# Patient Record
Sex: MALE | Race: WHITE | Employment: OTHER | URBAN - METROPOLITAN AREA
[De-identification: names, ages, dates, MRNs, and addresses within clinical notes are randomized per-mention and may not be internally consistent; named-entity substitution may affect disease eponyms.]

---

## 2019-06-06 ENCOUNTER — OFFICE VISIT (OUTPATIENT)
Dept: FAMILY MEDICINE CLINIC | Age: 54
End: 2019-06-06
Payer: COMMERCIAL

## 2019-06-06 VITALS
OXYGEN SATURATION: 95 % | HEIGHT: 71 IN | DIASTOLIC BLOOD PRESSURE: 94 MMHG | SYSTOLIC BLOOD PRESSURE: 162 MMHG | WEIGHT: 273 LBS | TEMPERATURE: 97.4 F | BODY MASS INDEX: 38.22 KG/M2 | RESPIRATION RATE: 16 BRPM | HEART RATE: 88 BPM

## 2019-06-06 DIAGNOSIS — E66.01 CLASS 2 SEVERE OBESITY DUE TO EXCESS CALORIES WITH SERIOUS COMORBIDITY AND BODY MASS INDEX (BMI) OF 38.0 TO 38.9 IN ADULT (HCC): ICD-10-CM

## 2019-06-06 DIAGNOSIS — F41.9 ANXIETY AND DEPRESSION: ICD-10-CM

## 2019-06-06 DIAGNOSIS — Z12.11 SCREENING FOR COLON CANCER: ICD-10-CM

## 2019-06-06 DIAGNOSIS — M25.511 CHRONIC PAIN OF BOTH SHOULDERS: ICD-10-CM

## 2019-06-06 DIAGNOSIS — I10 ESSENTIAL HYPERTENSION: Primary | ICD-10-CM

## 2019-06-06 DIAGNOSIS — F32.A ANXIETY AND DEPRESSION: ICD-10-CM

## 2019-06-06 DIAGNOSIS — Z78.9 HEAVY DRINKER OF ALCOHOL: ICD-10-CM

## 2019-06-06 DIAGNOSIS — Z00.00 PREVENTATIVE HEALTH CARE: ICD-10-CM

## 2019-06-06 DIAGNOSIS — M25.512 CHRONIC PAIN OF BOTH SHOULDERS: ICD-10-CM

## 2019-06-06 DIAGNOSIS — G89.29 CHRONIC PAIN OF BOTH SHOULDERS: ICD-10-CM

## 2019-06-06 DIAGNOSIS — Z12.5 SPECIAL SCREENING FOR MALIGNANT NEOPLASM OF PROSTATE: ICD-10-CM

## 2019-06-06 DIAGNOSIS — K21.9 GASTRIC REFLUX: ICD-10-CM

## 2019-06-06 PROBLEM — E66.812 CLASS 2 SEVERE OBESITY DUE TO EXCESS CALORIES WITH SERIOUS COMORBIDITY AND BODY MASS INDEX (BMI) OF 38.0 TO 38.9 IN ADULT: Status: ACTIVE | Noted: 2019-06-06

## 2019-06-06 PROCEDURE — 99204 OFFICE O/P NEW MOD 45 MIN: CPT | Performed by: NURSE PRACTITIONER

## 2019-06-06 RX ORDER — LOSARTAN POTASSIUM 100 MG/1
100 TABLET ORAL DAILY
Qty: 90 TABLET | Refills: 1 | Status: SHIPPED | OUTPATIENT
Start: 2019-06-06 | End: 2019-12-05 | Stop reason: SDUPTHER

## 2019-06-06 RX ORDER — NAPROXEN 500 MG/1
500 TABLET ORAL 2 TIMES DAILY PRN
Qty: 180 TABLET | Refills: 0 | Status: SHIPPED | OUTPATIENT
Start: 2019-06-06 | End: 2019-12-05 | Stop reason: SDUPTHER

## 2019-06-06 RX ORDER — LOSARTAN POTASSIUM 100 MG/1
100 TABLET ORAL DAILY
COMMUNITY
End: 2019-06-06 | Stop reason: SDUPTHER

## 2019-06-06 RX ORDER — METOPROLOL TARTRATE 50 MG/1
50 TABLET, FILM COATED ORAL DAILY
Refills: 2 | COMMUNITY
Start: 2019-04-22 | End: 2019-06-06 | Stop reason: SDUPTHER

## 2019-06-06 RX ORDER — HYDROCHLOROTHIAZIDE 25 MG/1
25 TABLET ORAL DAILY
COMMUNITY
End: 2019-06-06 | Stop reason: SDUPTHER

## 2019-06-06 RX ORDER — METOPROLOL TARTRATE 50 MG/1
50 TABLET, FILM COATED ORAL DAILY
Qty: 90 TABLET | Refills: 1 | Status: SHIPPED | OUTPATIENT
Start: 2019-06-06 | End: 2019-12-05 | Stop reason: SDUPTHER

## 2019-06-06 RX ORDER — MELOXICAM 15 MG/1
15 TABLET ORAL
COMMUNITY
Start: 2018-02-12 | End: 2019-06-06 | Stop reason: ALTCHOICE

## 2019-06-06 RX ORDER — HYDROCHLOROTHIAZIDE 25 MG/1
25 TABLET ORAL DAILY
Qty: 90 TABLET | Refills: 1 | Status: SHIPPED | OUTPATIENT
Start: 2019-06-06 | End: 2019-12-05 | Stop reason: SDUPTHER

## 2019-06-06 RX ORDER — OMEPRAZOLE 20 MG/1
20 CAPSULE, DELAYED RELEASE ORAL DAILY
Qty: 90 CAPSULE | Refills: 1 | Status: SHIPPED | OUTPATIENT
Start: 2019-06-06 | End: 2019-12-05 | Stop reason: SDUPTHER

## 2019-06-06 RX ORDER — OMEPRAZOLE 20 MG/1
CAPSULE, DELAYED RELEASE ORAL
Refills: 1 | COMMUNITY
Start: 2019-03-18 | End: 2019-06-06 | Stop reason: SDUPTHER

## 2019-06-06 ASSESSMENT — ENCOUNTER SYMPTOMS
SHORTNESS OF BREATH: 0
APNEA: 0

## 2019-06-06 ASSESSMENT — PATIENT HEALTH QUESTIONNAIRE - PHQ9
SUM OF ALL RESPONSES TO PHQ QUESTIONS 1-9: 1
SUM OF ALL RESPONSES TO PHQ QUESTIONS 1-9: 1
2. FEELING DOWN, DEPRESSED OR HOPELESS: 1
SUM OF ALL RESPONSES TO PHQ9 QUESTIONS 1 & 2: 1
1. LITTLE INTEREST OR PLEASURE IN DOING THINGS: 0

## 2019-06-06 NOTE — PROGRESS NOTES
Caryl Levi. ART Willingham-CNP  [address] [facility]  [address], [place] [address]  Dept: [phone]  Dept Fax: [phone]    HPI:   Marlene Braun is a 49 y.o. male who presents today for his medical conditions/complaintsas noted below. Marlene Braun is c/o of Establish Care and Hypertension    HPI  Patient presents today to establish care with a new primary care provider. Previous primary care provider: RILEY Hernandes. Date last evaluated by former primary care provider: 6 months ago. HTN: Current medication regimen to control condition includes: Metoprolol 50 mg QD, losartan 100 mg QD, HCTZ 25 mg QD- has been out of medications for about 30 days. When he was taking medications, BP was well controlled- dx'd with HTN about 30 years ago. He does monitor BP outside of the office. BP has been high since he has been off of his medications. He is a  and undergoes yearly DOT physicals- needs DBP <90. Will be due again for DOT 2/2020. Denies chest pain, leg swelling, palpitations. GERD: Current medication regimen to control condition includes: Omeprazole 20 mg QD- previous dx of gastric reflux- s/s stable on medication. Chronic shoulder pain: He does have chronic pain to b/l shoulders- was taking Meloxicam PRN- which did help, would like refill. Depression: He was previously seen by Dr. Dorota Shi, about 5 years ago he felt like he was more emotional than usual, felt like it was easy to cry at times. He felt that mood was improved when he was taking Zoloft 50 mg QD. He has not taken it in about a year. Also does relay some anxiety, related to work. Denies any thoughts of harming themselves or others. Denies counseling in the past.   Past Medical History:   Diagnosis Date    Hypertension       Past Surgical History:   Procedure Laterality Date    HAND SURGERY      WISDOM TOOTH EXTRACTION         History reviewed.  No pertinent family history. Social History     Tobacco Use    Smoking status: Former Smoker     Packs/day: 2.00     Years: 35.00     Pack years: 70.00     Types: Cigarettes     Last attempt to quit: 2013     Years since quittin.4    Smokeless tobacco: Never Used   Substance Use Topics    Alcohol use: Yes     Alcohol/week: 7.2 oz     Types: 12 Cans of beer per week     Comment: Every other day      Current Outpatient Medications   Medication Sig Dispense Refill    naproxen (NAPROSYN) 500 MG tablet Take 1 tablet by mouth 2 times daily as needed for Pain 180 tablet 0    omeprazole (PRILOSEC) 20 MG delayed release capsule Take 1 capsule by mouth Daily 90 capsule 1    metoprolol tartrate (LOPRESSOR) 50 MG tablet Take 1 tablet by mouth daily 90 tablet 1    losartan (COZAAR) 100 MG tablet Take 1 tablet by mouth daily 90 tablet 1    hydrochlorothiazide (HYDRODIURIL) 25 MG tablet Take 1 tablet by mouth daily 90 tablet 1    sertraline (ZOLOFT) 50 MG tablet Take 1 tablet by mouth daily 90 tablet 1     No current facility-administered medications for this visit. No Known Allergies    Health Maintenance   Topic Date Due    Potassium monitoring  1966    Creatinine monitoring  1966    Lipid screen  2006    Diabetes screen  2006    Colon cancer screen colonoscopy  2016    DTaP/Tdap/Td vaccine (1 - Tdap) 2020 (Originally 1985)    Shingles Vaccine (1 of 2) 2020 (Originally 2016)    HIV screen  2029 (Originally 1981)    Flu vaccine (Season Ended) 2019    Pneumococcal 0-64 years Vaccine  Aged Out     Subjective:   Review of Systems   Constitutional: Negative for chills and fever. HENT: Negative. Respiratory: Negative for apnea (Denies snoring- screened for NEW per DOT physical) and shortness of breath. Cardiovascular: Negative for chest pain, palpitations and leg swelling. Psychiatric/Behavioral: Positive for dysphoric mood. patient questions answered, patient voiced understanding. Denny Fernandez.  Chanel Hurtado, APRN-CNP

## 2019-06-06 NOTE — PROGRESS NOTES
Visit Information    Have you changed or started any medications since your last visit including any over-the-counter medicines, vitamins, or herbal medicines? no   Have you stopped taking any of your medications? Is so, why? -  no  Are you having any side effects from any of your medications? - no    Have you seen any other physician or provider since your last visit?  no   Have you had any other diagnostic tests since your last visit?  no   Have you been seen in the emergency room and/or had an admission in a hospital since we last saw you?  no   Have you had your routine dental cleaning in the past 6 months?  yes - 4/19     Do you have an active MyChart account? If no, what is the barrier?   Yes    Patient Care Team:  ART Diehl - CNP as PCP - General (Nurse Practitioner Family)    Medical History Review  Past Medical, Family, and Social History reviewed and does contribute to the patient presenting condition    Health Maintenance   Topic Date Due    Potassium monitoring  1966    Creatinine monitoring  1966    Lipid screen  02/21/2006    Colon cancer screen colonoscopy  02/21/2016    DTaP/Tdap/Td vaccine (1 - Tdap) 06/06/2020 (Originally 2/21/1985)    Shingles Vaccine (1 of 2) 06/06/2020 (Originally 2/21/2016)    HIV screen  06/06/2029 (Originally 2/21/1981)    Flu vaccine (Season Ended) 09/01/2019    Pneumococcal 0-64 years Vaccine  Aged Out

## 2019-06-13 LAB
ALBUMIN SERPL-MCNC: 4.3 G/DL
ALP BLD-CCNC: 89 U/L
ALT SERPL-CCNC: 38 U/L
ANION GAP SERPL CALCULATED.3IONS-SCNC: 9 MMOL/L
AST SERPL-CCNC: 37 U/L
AVERAGE GLUCOSE: 128
BASOPHILS ABSOLUTE: ABNORMAL /ΜL
BASOPHILS RELATIVE PERCENT: ABNORMAL %
BILIRUB SERPL-MCNC: 0.5 MG/DL (ref 0.1–1.4)
BUN BLDV-MCNC: 19 MG/DL
CALCIUM SERPL-MCNC: 9.5 MG/DL
CHLORIDE BLD-SCNC: 101 MMOL/L
CHOLESTEROL, TOTAL: 204 MG/DL
CHOLESTEROL/HDL RATIO: 3.9
CO2: 30 MMOL/L
CREAT SERPL-MCNC: 1.06 MG/DL
EOSINOPHILS ABSOLUTE: ABNORMAL /ΜL
EOSINOPHILS RELATIVE PERCENT: ABNORMAL %
GFR CALCULATED: NORMAL
GLUCOSE BLD-MCNC: 128 MG/DL
HBA1C MFR BLD: 6.1 %
HCT VFR BLD CALC: 52.8 % (ref 41–53)
HDLC SERPL-MCNC: 52 MG/DL (ref 35–70)
HEMOGLOBIN: 18.7 G/DL (ref 13.5–17.5)
LDL CHOLESTEROL CALCULATED: 112 MG/DL (ref 0–160)
LYMPHOCYTES ABSOLUTE: ABNORMAL /ΜL
LYMPHOCYTES RELATIVE PERCENT: ABNORMAL %
MCH RBC QN AUTO: ABNORMAL PG
MCHC RBC AUTO-ENTMCNC: ABNORMAL G/DL
MCV RBC AUTO: ABNORMAL FL
MONOCYTES ABSOLUTE: ABNORMAL /ΜL
MONOCYTES RELATIVE PERCENT: ABNORMAL %
NEUTROPHILS ABSOLUTE: ABNORMAL /ΜL
NEUTROPHILS RELATIVE PERCENT: ABNORMAL %
PLATELET # BLD: 239 K/ΜL
PMV BLD AUTO: ABNORMAL FL
POTASSIUM SERPL-SCNC: 4.8 MMOL/L
PROSTATE SPECIFIC ANTIGEN: 1.51 NG/ML
RBC # BLD: ABNORMAL 10^6/ΜL
SODIUM BLD-SCNC: 140 MMOL/L
TOTAL PROTEIN: 7.2
TRIGL SERPL-MCNC: 201 MG/DL
VLDLC SERPL CALC-MCNC: 40 MG/DL
WBC # BLD: 8 10^3/ML

## 2019-06-14 DIAGNOSIS — I10 ESSENTIAL HYPERTENSION: ICD-10-CM

## 2019-06-14 DIAGNOSIS — Z00.00 PREVENTATIVE HEALTH CARE: ICD-10-CM

## 2019-06-14 DIAGNOSIS — E66.01 CLASS 2 SEVERE OBESITY DUE TO EXCESS CALORIES WITH SERIOUS COMORBIDITY AND BODY MASS INDEX (BMI) OF 38.0 TO 38.9 IN ADULT (HCC): ICD-10-CM

## 2019-06-19 DIAGNOSIS — R19.5 POSITIVE COLORECTAL CANCER SCREENING USING COLOGUARD TEST: Primary | ICD-10-CM

## 2019-06-19 DIAGNOSIS — Z12.11 SCREENING FOR COLON CANCER: ICD-10-CM

## 2019-06-27 ENCOUNTER — TELEPHONE (OUTPATIENT)
Dept: GASTROENTEROLOGY | Age: 54
End: 2019-06-27

## 2019-06-28 ENCOUNTER — OFFICE VISIT (OUTPATIENT)
Dept: FAMILY MEDICINE CLINIC | Age: 54
End: 2019-06-28
Payer: COMMERCIAL

## 2019-06-28 VITALS
SYSTOLIC BLOOD PRESSURE: 114 MMHG | HEART RATE: 68 BPM | WEIGHT: 273 LBS | DIASTOLIC BLOOD PRESSURE: 74 MMHG | BODY MASS INDEX: 38.43 KG/M2 | RESPIRATION RATE: 16 BRPM | OXYGEN SATURATION: 98 % | TEMPERATURE: 98.5 F

## 2019-06-28 DIAGNOSIS — K42.9 UMBILICAL HERNIA WITHOUT OBSTRUCTION AND WITHOUT GANGRENE: ICD-10-CM

## 2019-06-28 DIAGNOSIS — K21.9 GASTRIC REFLUX: ICD-10-CM

## 2019-06-28 DIAGNOSIS — F32.A ANXIETY AND DEPRESSION: ICD-10-CM

## 2019-06-28 DIAGNOSIS — D58.2 ELEVATED HEMOGLOBIN (HCC): ICD-10-CM

## 2019-06-28 DIAGNOSIS — R74.01 ELEVATED ALT MEASUREMENT: ICD-10-CM

## 2019-06-28 DIAGNOSIS — F41.9 ANXIETY AND DEPRESSION: ICD-10-CM

## 2019-06-28 DIAGNOSIS — I10 ESSENTIAL HYPERTENSION: Primary | ICD-10-CM

## 2019-06-28 DIAGNOSIS — E78.00 PURE HYPERCHOLESTEROLEMIA: ICD-10-CM

## 2019-06-28 DIAGNOSIS — R19.5 POSITIVE COLORECTAL CANCER SCREENING USING COLOGUARD TEST: ICD-10-CM

## 2019-06-28 DIAGNOSIS — R73.03 PREDIABETES: ICD-10-CM

## 2019-06-28 DIAGNOSIS — R19.09 UMBILICAL MASS: ICD-10-CM

## 2019-06-28 PROCEDURE — 99214 OFFICE O/P EST MOD 30 MIN: CPT | Performed by: NURSE PRACTITIONER

## 2019-06-28 RX ORDER — ASPIRIN 81 MG/1
81 TABLET ORAL DAILY
Qty: 90 TABLET | Refills: 1 | Status: SHIPPED | OUTPATIENT
Start: 2019-06-28 | End: 2019-12-05 | Stop reason: SDUPTHER

## 2019-06-28 RX ORDER — ATORVASTATIN CALCIUM 10 MG/1
10 TABLET, FILM COATED ORAL NIGHTLY
Qty: 90 TABLET | Refills: 1 | Status: SHIPPED | OUTPATIENT
Start: 2019-06-28 | End: 2019-12-05 | Stop reason: SDUPTHER

## 2019-06-28 ASSESSMENT — ENCOUNTER SYMPTOMS
GASTROINTESTINAL NEGATIVE: 1
SHORTNESS OF BREATH: 0

## 2019-06-28 NOTE — PATIENT INSTRUCTIONS
Patient Education        Learning About Diabetes Food Guidelines  Your Care Instructions    Meal planning is important to manage diabetes. It helps keep your blood sugar at a target level (which you set with your doctor). You don't have to eat special foods. You can eat what your family eats, including sweets once in a while. But you do have to pay attention to how often you eat and how much you eat of certain foods. You may want to work with a dietitian or a certified diabetes educator (CDE) to help you plan meals and snacks. A dietitian or CDE can also help you lose weight if that is one of your goals. What should you know about eating carbs? Managing the amount of carbohydrate (carbs) you eat is an important part of healthy meals when you have diabetes. Carbohydrate is found in many foods. · Learn which foods have carbs. And learn the amounts of carbs in different foods. ? Bread, cereal, pasta, and rice have about 15 grams of carbs in a serving. A serving is 1 slice of bread (1 ounce), ½ cup of cooked cereal, or 1/3 cup of cooked pasta or rice. ? Fruits have 15 grams of carbs in a serving. A serving is 1 small fresh fruit, such as an apple or orange; ½ of a banana; ½ cup of cooked or canned fruit; ½ cup of fruit juice; 1 cup of melon or raspberries; or 2 tablespoons of dried fruit. ? Milk and no-sugar-added yogurt have 15 grams of carbs in a serving. A serving is 1 cup of milk or 2/3 cup of no-sugar-added yogurt. ? Starchy vegetables have 15 grams of carbs in a serving. A serving is ½ cup of mashed potatoes or sweet potato; 1 cup winter squash; ½ of a small baked potato; ½ cup of cooked beans; or ½ cup cooked corn or green peas. · Learn how much carbs to eat each day and at each meal. A dietitian or CDE can teach you how to keep track of the amount of carbs you eat. This is called carbohydrate counting. · If you are not sure how to count carbohydrate grams, use the Plate Method to plan meals.  It is a good, quick way to make sure that you have a balanced meal. It also helps you spread carbs throughout the day. ? Divide your plate by types of foods. Put non-starchy vegetables on half the plate, meat or other protein food on one-quarter of the plate, and a grain or starchy vegetable in the final quarter of the plate. To this you can add a small piece of fruit and 1 cup of milk or yogurt, depending on how many carbs you are supposed to eat at a meal.  · Try to eat about the same amount of carbs at each meal. Do not \"save up\" your daily allowance of carbs to eat at one meal.  · Proteins have very little or no carbs per serving. Examples of proteins are beef, chicken, turkey, fish, eggs, tofu, cheese, cottage cheese, and peanut butter. A serving size of meat is 3 ounces, which is about the size of a deck of cards. Examples of meat substitute serving sizes (equal to 1 ounce of meat) are 1/4 cup of cottage cheese, 1 egg, 1 tablespoon of peanut butter, and ½ cup of tofu. How can you eat out and still eat healthy? · Learn to estimate the serving sizes of foods that have carbohydrate. If you measure food at home, it will be easier to estimate the amount in a serving of restaurant food. · If the meal you order has too much carbohydrate (such as potatoes, corn, or baked beans), ask to have a low-carbohydrate food instead. Ask for a salad or green vegetables. · If you use insulin, check your blood sugar before and after eating out to help you plan how much to eat in the future. · If you eat more carbohydrate at a meal than you had planned, take a walk or do other exercise. This will help lower your blood sugar. What else should you know? · Limit saturated fat, such as the fat from meat and dairy products. This is a healthy choice because people who have diabetes are at higher risk of heart disease. So choose lean cuts of meat and nonfat or low-fat dairy products.  Use olive or canola oil instead of butter or shortening when cooking. · Don't skip meals. Your blood sugar may drop too low if you skip meals and take insulin or certain medicines for diabetes. · Check with your doctor before you drink alcohol. Alcohol can cause your blood sugar to drop too low. Alcohol can also cause a bad reaction if you take certain diabetes medicines. Follow-up care is a key part of your treatment and safety. Be sure to make and go to all appointments, and call your doctor if you are having problems. It's also a good idea to know your test results and keep a list of the medicines you take. Where can you learn more? Go to https://[employer] Floor[employer]iceweb.[email or web address]. org and sign in to your Cross[employer] account. Enter R635 in the StartMe box to learn more about \"Learning About Diabetes Food Guidelines. \"     If you do not have an account, please click on the \"Sign Up Now\" link. Current as of: July 25, 2018  Content Version: 12.0  © 3857-5389 Healthwise, Incorporated. Care instructions adapted under license by [facility] ([facility]). If you have questions about a medical condition or this instruction, always ask your healthcare professional. [place] [address] any warranty or liability for your use of this information.

## 2019-06-28 NOTE — PROGRESS NOTES
Kendra Lama. Sharmainerinku Rolon, APRN-CNP  [address] [facility]  [address] [place]  [place] [facility] [address]  Dept: [phone]  Dept Fax: [phone]    HPI:   Angel Btaes is a 49 y.o. male who presents today for his medical conditions/complaintsas noted below. Angel Bates is c/o of Hypertension (review labs)    HPI  HTN:  Re-started on the following BP medications about 3 weeks ago:  Blood pressure is much improved- denies chest pain, palpitations, SOB.  - metoprolol tartrate (LOPRESSOR) 50 MG tablet; Take 1 tablet by mouth daily  Dispense: 90 tablet; Refill: 1  - losartan (COZAAR) 100 MG tablet; Take 1 tablet by mouth daily  Dispense: 90 tablet; Refill: 1  - hydrochlorothiazide (HYDRODIURIL) 25 MG tablet; Take 1 tablet by mouth daily  Dispense: 90 tablet; Refill: 1  BP Readings from Last 3 Encounters:   06/28/19 114/74   06/06/19 (!) 162/94     Anxiety/depression:  He is feeling much better since re-starting Zoloft- anxiety, mood is much improved. Re-started on Zoloft 50 mg QD about 3 weeks ago. +Cologuard: New referral placed to Dr. Ely Gaytan for diagnostic colonoscopy. HLD: Was never on statin medication in the past.  The 10-year ASCVD risk score (Linda Cifuentes, et al., 2013) is: 4.4%    Values used to calculate the score:      Age: 49 years      Sex: Male      Is Non- : No      Diabetic: No      Tobacco smoker: No      Systolic Blood Pressure: 890 mmHg      Is BP treated: Yes      HDL Cholesterol: 52 mg/dL      Total Cholesterol: 204 mg/dL    Past Medical History:   Diagnosis Date    Hypertension     Positive colorectal cancer screening using Cologuard test 06/2019      Past Surgical History:   Procedure Laterality Date    HAND SURGERY      WISDOM TOOTH EXTRACTION         No family history on file.     Social History     Tobacco Use    Smoking status: Former Smoker     Packs/day: 2.00     Years: 35.00     Pack years: 70.00 Types: Cigarettes     Last attempt to quit: 2013     Years since quittin.4    Smokeless tobacco: Never Used   Substance Use Topics    Alcohol use: Yes     Alcohol/week: 7.2 oz     Types: 12 Cans of beer per week     Comment: Every other day      Current Outpatient Medications   Medication Sig Dispense Refill    metFORMIN (GLUCOPHAGE) 500 MG tablet Take 1 tablet by mouth daily (with breakfast) 90 tablet 1    atorvastatin (LIPITOR) 10 MG tablet Take 1 tablet by mouth nightly 90 tablet 1    aspirin EC 81 MG EC tablet Take 1 tablet by mouth daily 90 tablet 1    naproxen (NAPROSYN) 500 MG tablet Take 1 tablet by mouth 2 times daily as needed for Pain 180 tablet 0    omeprazole (PRILOSEC) 20 MG delayed release capsule Take 1 capsule by mouth Daily 90 capsule 1    metoprolol tartrate (LOPRESSOR) 50 MG tablet Take 1 tablet by mouth daily 90 tablet 1    losartan (COZAAR) 100 MG tablet Take 1 tablet by mouth daily 90 tablet 1    hydrochlorothiazide (HYDRODIURIL) 25 MG tablet Take 1 tablet by mouth daily 90 tablet 1    sertraline (ZOLOFT) 50 MG tablet Take 1 tablet by mouth daily 90 tablet 1     No current facility-administered medications for this visit. No Known Allergies    Health Maintenance   Topic Date Due    DTaP/Tdap/Td vaccine (1 - Tdap) 2020 (Originally 1985)    Shingles Vaccine (1 of 2) 2020 (Originally 2016)    HIV screen  2029 (Originally 1981)    Flu vaccine (Season Ended) 2019    A1C test (Diabetic or Prediabetic)  2020    Potassium monitoring  2020    Creatinine monitoring  2020    Colon cancer screen fecal DNA test (Cologuard)  2022    Lipid screen  2024    Pneumococcal 0-64 years Vaccine  Aged Out     Subjective:   Review of Systems   Constitutional: Negative for chills and fever. Respiratory: Negative for shortness of breath. Cardiovascular: Negative for chest pain. Gastrointestinal: Negative. Objective:   /74   Pulse 68   Temp 98.5 °F (36.9 °C)   Resp 16   Wt 273 lb (123.8 kg)   SpO2 98%   BMI 38.43 kg/m²   Physical Exam   Constitutional: He is oriented to person, place, and time. He appears well-developed and well-nourished. Non-toxic appearance. He does not have a sickly appearance. HENT:   Head: Normocephalic and atraumatic. Right Ear: External ear normal.   Left Ear: External ear normal.   Eyes: Conjunctivae are normal.   Neck: Normal range of motion. Cardiovascular: Normal rate, regular rhythm, normal heart sounds and intact distal pulses. Exam reveals no gallop and no friction rub. No murmur heard. Pulmonary/Chest: Effort normal and breath sounds normal. No accessory muscle usage. No respiratory distress. He has no decreased breath sounds. He has no wheezes. He has no rhonchi. He has no rales. Abdominal: Soft. Normal appearance and bowel sounds are normal. He exhibits mass (Possible umbilical hernia, soft, movable, golf ball sized mass to umbilical region). There is no tenderness. A hernia is present. Musculoskeletal: Normal range of motion. He exhibits no edema (No swelling to b/l LE's). Neurological: He is alert and oriented to person, place, and time. Skin: Skin is warm and dry. No rash noted. No erythema. Psychiatric: He has a normal mood and affect. His behavior is normal.   Nursing note and vitals reviewed.     Data:     Lab Results   Component Value Date     06/13/2019    K 4.8 06/13/2019     06/13/2019    CO2 30 06/13/2019    BUN 19 06/13/2019    CREATININE 1.06 06/13/2019    GLUCOSE 128 06/13/2019    LABALBU 4.3 06/13/2019    BILITOT 0.5 06/13/2019    ALKPHOS 89 06/13/2019    AST 37 06/13/2019    ALT 38 06/13/2019     Lab Results   Component Value Date    WBC 8.0 06/13/2019    HGB 18.7 06/13/2019    HCT 52.8 06/13/2019     06/13/2019     No results found for: TSH  Lab Results   Component Value Date    CHOL 204 06/13/2019    HDL 52 06/13/2019    LABA1C 6.1 06/13/2019     Assessment & Plan:       Most recent labs reviewed and discussed with patient. 1. Essential hypertension  -Stable, improved: Con't all medications as ordered, con't current tx plan  -Start daily baby ASA for MI prevention d/t cardiac risk factors- (instructed patient to take baby ASA 2 hours prior to NSAID to avoid additive effects)    2. Anxiety and depression  -Stable, improved: Con't all medications as ordered, con't current tx plan    3. Positive colorectal cancer screening using Cologuard test  -Will refer for further evaluation and possible treatment:  - Alicia Chandler MD, General Surgery, [place]    4. Elevated ALT measurement  -Decrease ETOH intake, re-check labs in 1 month  Lab Results   Component Value Date    ALKPHOS 89 06/13/2019    ALT 58 06/13/2019    AST 37 06/13/2019    BILITOT 0.5 06/13/2019    LABALBU 4.3 06/13/2019   - Comprehensive Metabolic Panel; Future    5. Prediabetes  -Start metformin as ordered  -Was told he was \"borderline\" diabetic in the past  -Has access to glucose monitoring supplies, verbalizes understanding in checking BS's- goal <100, check at random times throughout the day  -Lifestyle changes discussed and encouraged: Decrease fats, sugars, carbohydrates, and increase routine exercise  - metFORMIN (GLUCOPHAGE) 500 MG tablet; Take 1 tablet by mouth daily (with breakfast)  Dispense: 90 tablet; Refill: 1    6. Elevated hemoglobin ([address]  -Stay well hydrated- possibly r/t dehydration/former smoker  - CBC Auto Differential; Future    7. Pure hypercholesterolemia  -Start low dose Lipitor as ordered, will monitor liver enzymes closely, re-check labs in 1 month  -Healthy diet and routine exercise encouraged  - atorvastatin (LIPITOR) 10 MG tablet; Take 1 tablet by mouth nightly  Dispense: 90 tablet; Refill: 1  - Lipid Panel; Future    8.  Umbilical hernia without obstruction and without gangrene  -Possible umbilical hernia- not painful  -Will refer for further evaluation and possible treatment:  - Kalina Pickens MD, General Surgery, [place]  - CT ABDOMEN PELVIS WO CONTRAST Additional Contrast? Radiologist Recommendation; Future    9. Umbilical mass  -Labs/referral as ordered  - Kalina Pickens MD, General Surgery, [place]  - CT ABDOMEN PELVIS WO CONTRAST Additional Contrast? Radiologist Recommendation; Future    10. Gastric reflux  -Stable: Con't all medications as ordered, con't current tx plan    Return in about 2 months (around 8/28/2019) for HTN, DM. Discussed use, benefit, and side effects of prescribed medications. Barriers to medication compliance addressed. All patient questions answered, patient voiced understanding. Kendra Lama.  ART Shipley-CNP

## 2019-06-28 NOTE — PROGRESS NOTES
HYPERTENSION visit     BP Readings from Last 3 Encounters:   06/06/19 (!) 162/94       LDL Calculated (mg/dL)   Date Value   06/13/2019 112     HDL (mg/dL)   Date Value   06/13/2019 52     BUN (mg/dL)   Date Value   06/13/2019 19     CREATININE (no units)   Date Value   06/13/2019 1.06     Glucose (mg/dL)   Date Value   06/13/2019 128              Have you changed or started any medications since your last visit including any over-the-counter medicines, vitamins, or herbal medicines? no   Have you stopped taking any of your medications? Is so, why? -  no  Are you having any side effects from any of your medications? - no  How often do you miss doses of your medication? rare      Have you seen any other physician or provider since your last visit?  no   Have you had any other diagnostic tests since your last visit? yes - labs, cologuard   Have you been seen in the emergency room and/or had an admission in a hospital since we last saw you?  no   Have you had your routine dental cleaning in the past 6 months?  yes - 01/2019     Do you have an active [employer] account? If no, what is the barrier?   Yes    Patient Care Team:  ART Lugo CNP as PCP - General (Nurse Practitioner Family)  ART Lugo CNP as PCP - [facility]    Medical History Review  Past Medical, Family, and Social History reviewed and does contribute to the patient presenting condition    Health Maintenance   Topic Date Due    DTaP/Tdap/Td vaccine (1 - Tdap) 06/06/2020 (Originally 2/21/1985)    Shingles Vaccine (1 of 2) 06/06/2020 (Originally 2/21/2016)    HIV screen  06/06/2029 (Originally 2/21/1981)    Flu vaccine (Season Ended) 09/01/2019    A1C test (Diabetic or Prediabetic)  06/13/2020    Potassium monitoring  06/13/2020    Creatinine monitoring  06/13/2020    Colon cancer screen fecal DNA test (Cologuard)  06/11/2022    Lipid screen  06/13/2024    Pneumococcal 0-64 years Vaccine  Aged Out Patient/Caregiver verbalize understanding of medications.   Yes

## 2019-07-08 ENCOUNTER — OFFICE VISIT (OUTPATIENT)
Dept: SURGERY | Age: 54
End: 2019-07-08
Payer: COMMERCIAL

## 2019-07-08 ENCOUNTER — PREP FOR PROCEDURE (OUTPATIENT)
Dept: SURGERY | Age: 54
End: 2019-07-08

## 2019-07-08 VITALS
WEIGHT: 272.8 LBS | SYSTOLIC BLOOD PRESSURE: 135 MMHG | OXYGEN SATURATION: 94 % | BODY MASS INDEX: 38.19 KG/M2 | DIASTOLIC BLOOD PRESSURE: 88 MMHG | HEIGHT: 71 IN | HEART RATE: 83 BPM

## 2019-07-08 DIAGNOSIS — R19.5 POSITIVE COLORECTAL CANCER SCREENING USING COLOGUARD TEST: Primary | ICD-10-CM

## 2019-07-08 PROCEDURE — 99202 OFFICE O/P NEW SF 15 MIN: CPT | Performed by: SURGERY

## 2019-07-08 RX ORDER — SODIUM, POTASSIUM,MAG SULFATES 17.5-3.13G
SOLUTION, RECONSTITUTED, ORAL ORAL
Qty: 1 KIT | Refills: 0 | Status: SHIPPED | OUTPATIENT
Start: 2019-07-08 | End: 2019-08-30 | Stop reason: ALTCHOICE

## 2019-07-08 ASSESSMENT — ENCOUNTER SYMPTOMS: RESPIRATORY NEGATIVE: 1

## 2019-07-10 ENCOUNTER — HOSPITAL ENCOUNTER (OUTPATIENT)
Dept: CT IMAGING | Age: 54
Discharge: HOME | End: 2019-07-12
Payer: COMMERCIAL

## 2019-07-10 DIAGNOSIS — K43.9 VENTRAL HERNIA WITHOUT OBSTRUCTION OR GANGRENE: ICD-10-CM

## 2019-07-10 DIAGNOSIS — K42.9 UMBILICAL HERNIA WITHOUT OBSTRUCTION AND WITHOUT GANGRENE: ICD-10-CM

## 2019-07-10 DIAGNOSIS — R19.09 UMBILICAL MASS: ICD-10-CM

## 2019-07-10 DIAGNOSIS — K40.90 RIGHT INGUINAL HERNIA: ICD-10-CM

## 2019-07-10 DIAGNOSIS — N20.0 KIDNEY STONES: ICD-10-CM

## 2019-07-10 PROCEDURE — 74176 CT ABD & PELVIS W/O CONTRAST: CPT

## 2019-07-24 ENCOUNTER — ANESTHESIA EVENT (OUTPATIENT)
Dept: OPERATING ROOM | Age: 54
End: 2019-07-24
Payer: COMMERCIAL

## 2019-07-30 ENCOUNTER — ANESTHESIA (OUTPATIENT)
Dept: OPERATING ROOM | Age: 54
End: 2019-07-30
Payer: COMMERCIAL

## 2019-07-30 ENCOUNTER — HOSPITAL ENCOUNTER (OUTPATIENT)
Age: 54
Setting detail: OUTPATIENT SURGERY
Discharge: HOME | End: 2019-07-30
Attending: SURGERY | Admitting: SURGERY
Payer: COMMERCIAL

## 2019-07-30 VITALS
DIASTOLIC BLOOD PRESSURE: 90 MMHG | HEIGHT: 71 IN | TEMPERATURE: 98.4 F | OXYGEN SATURATION: 92 % | HEART RATE: 75 BPM | BODY MASS INDEX: 37.38 KG/M2 | WEIGHT: 266.98 LBS | RESPIRATION RATE: 16 BRPM | SYSTOLIC BLOOD PRESSURE: 117 MMHG

## 2019-07-30 VITALS
RESPIRATION RATE: 8 BRPM | DIASTOLIC BLOOD PRESSURE: 79 MMHG | SYSTOLIC BLOOD PRESSURE: 142 MMHG | OXYGEN SATURATION: 97 %

## 2019-07-30 LAB — GLUCOSE BLD-MCNC: 84 MG/DL (ref 75–110)

## 2019-07-30 PROCEDURE — 6360000002 HC RX W HCPCS: Performed by: NURSE ANESTHETIST, CERTIFIED REGISTERED

## 2019-07-30 PROCEDURE — 3700000000 HC ANESTHESIA ATTENDED CARE: Performed by: SURGERY

## 2019-07-30 PROCEDURE — 2580000003 HC RX 258: Performed by: ANESTHESIOLOGY

## 2019-07-30 PROCEDURE — 45385 COLONOSCOPY W/LESION REMOVAL: CPT | Performed by: SURGERY

## 2019-07-30 PROCEDURE — 3609027000 HC COLONOSCOPY: Performed by: SURGERY

## 2019-07-30 PROCEDURE — 7100000011 HC PHASE II RECOVERY - ADDTL 15 MIN: Performed by: SURGERY

## 2019-07-30 PROCEDURE — 88305 TISSUE EXAM BY PATHOLOGIST: CPT

## 2019-07-30 PROCEDURE — 2709999900 HC NON-CHARGEABLE SUPPLY: Performed by: SURGERY

## 2019-07-30 PROCEDURE — 3700000001 HC ADD 15 MINUTES (ANESTHESIA): Performed by: SURGERY

## 2019-07-30 PROCEDURE — 2580000003 HC RX 258: Performed by: SURGERY

## 2019-07-30 PROCEDURE — 7100000010 HC PHASE II RECOVERY - FIRST 15 MIN: Performed by: SURGERY

## 2019-07-30 PROCEDURE — 82947 ASSAY GLUCOSE BLOOD QUANT: CPT

## 2019-07-30 RX ORDER — MORPHINE SULFATE 1 MG/ML
1 INJECTION, SOLUTION EPIDURAL; INTRATHECAL; INTRAVENOUS EVERY 5 MIN PRN
Status: DISCONTINUED | OUTPATIENT
Start: 2019-07-30 | End: 2019-07-30 | Stop reason: HOSPADM

## 2019-07-30 RX ORDER — SODIUM CHLORIDE 0.9 % (FLUSH) 0.9 %
10 SYRINGE (ML) INJECTION EVERY 12 HOURS SCHEDULED
Status: DISCONTINUED | OUTPATIENT
Start: 2019-07-30 | End: 2019-07-30 | Stop reason: HOSPADM

## 2019-07-30 RX ORDER — MAGNESIUM HYDROXIDE 1200 MG/15ML
LIQUID ORAL PRN
Status: DISCONTINUED | OUTPATIENT
Start: 2019-07-30 | End: 2019-07-30 | Stop reason: ALTCHOICE

## 2019-07-30 RX ORDER — SODIUM CHLORIDE, SODIUM LACTATE, POTASSIUM CHLORIDE, CALCIUM CHLORIDE 600; 310; 30; 20 MG/100ML; MG/100ML; MG/100ML; MG/100ML
INJECTION, SOLUTION INTRAVENOUS CONTINUOUS
Status: DISCONTINUED | OUTPATIENT
Start: 2019-07-30 | End: 2019-07-30 | Stop reason: HOSPADM

## 2019-07-30 RX ORDER — MIDAZOLAM HYDROCHLORIDE 1 MG/ML
2 INJECTION INTRAMUSCULAR; INTRAVENOUS
Status: DISCONTINUED | OUTPATIENT
Start: 2019-07-30 | End: 2019-07-30 | Stop reason: HOSPADM

## 2019-07-30 RX ORDER — FENTANYL CITRATE 50 UG/ML
25 INJECTION, SOLUTION INTRAMUSCULAR; INTRAVENOUS EVERY 5 MIN PRN
Status: DISCONTINUED | OUTPATIENT
Start: 2019-07-30 | End: 2019-07-30 | Stop reason: HOSPADM

## 2019-07-30 RX ORDER — DIPHENHYDRAMINE HYDROCHLORIDE 50 MG/ML
12.5 INJECTION INTRAMUSCULAR; INTRAVENOUS
Status: DISCONTINUED | OUTPATIENT
Start: 2019-07-30 | End: 2019-07-30 | Stop reason: HOSPADM

## 2019-07-30 RX ORDER — SODIUM CHLORIDE 9 MG/ML
INJECTION, SOLUTION INTRAVENOUS CONTINUOUS
Status: DISCONTINUED | OUTPATIENT
Start: 2019-07-30 | End: 2019-07-30 | Stop reason: HOSPADM

## 2019-07-30 RX ORDER — SODIUM CHLORIDE 0.9 % (FLUSH) 0.9 %
10 SYRINGE (ML) INJECTION PRN
Status: DISCONTINUED | OUTPATIENT
Start: 2019-07-30 | End: 2019-07-30 | Stop reason: HOSPADM

## 2019-07-30 RX ORDER — HYDRALAZINE HYDROCHLORIDE 20 MG/ML
5 INJECTION INTRAMUSCULAR; INTRAVENOUS EVERY 10 MIN PRN
Status: DISCONTINUED | OUTPATIENT
Start: 2019-07-30 | End: 2019-07-30 | Stop reason: HOSPADM

## 2019-07-30 RX ORDER — HYDROCODONE BITARTRATE AND ACETAMINOPHEN 5; 325 MG/1; MG/1
2 TABLET ORAL PRN
Status: DISCONTINUED | OUTPATIENT
Start: 2019-07-30 | End: 2019-07-30 | Stop reason: HOSPADM

## 2019-07-30 RX ORDER — HYDROCODONE BITARTRATE AND ACETAMINOPHEN 5; 325 MG/1; MG/1
1 TABLET ORAL PRN
Status: DISCONTINUED | OUTPATIENT
Start: 2019-07-30 | End: 2019-07-30 | Stop reason: HOSPADM

## 2019-07-30 RX ORDER — PROMETHAZINE HYDROCHLORIDE 25 MG/ML
6.25 INJECTION, SOLUTION INTRAMUSCULAR; INTRAVENOUS
Status: DISCONTINUED | OUTPATIENT
Start: 2019-07-30 | End: 2019-07-30 | Stop reason: HOSPADM

## 2019-07-30 RX ORDER — PROPOFOL 10 MG/ML
INJECTION, EMULSION INTRAVENOUS PRN
Status: DISCONTINUED | OUTPATIENT
Start: 2019-07-30 | End: 2019-07-30 | Stop reason: SDUPTHER

## 2019-07-30 RX ORDER — MEPERIDINE HYDROCHLORIDE 50 MG/ML
12.5 INJECTION INTRAMUSCULAR; INTRAVENOUS; SUBCUTANEOUS EVERY 5 MIN PRN
Status: DISCONTINUED | OUTPATIENT
Start: 2019-07-30 | End: 2019-07-30 | Stop reason: HOSPADM

## 2019-07-30 RX ORDER — ONDANSETRON 2 MG/ML
4 INJECTION INTRAMUSCULAR; INTRAVENOUS
Status: DISCONTINUED | OUTPATIENT
Start: 2019-07-30 | End: 2019-07-30 | Stop reason: HOSPADM

## 2019-07-30 RX ADMIN — PROPOFOL 20 MG: 10 INJECTION, EMULSION INTRAVENOUS at 09:16

## 2019-07-30 RX ADMIN — PROPOFOL 20 MG: 10 INJECTION, EMULSION INTRAVENOUS at 09:05

## 2019-07-30 RX ADMIN — PROPOFOL 20 MG: 10 INJECTION, EMULSION INTRAVENOUS at 09:03

## 2019-07-30 RX ADMIN — PROPOFOL 20 MG: 10 INJECTION, EMULSION INTRAVENOUS at 09:09

## 2019-07-30 RX ADMIN — PROPOFOL 100 MG: 10 INJECTION, EMULSION INTRAVENOUS at 08:57

## 2019-07-30 RX ADMIN — PROPOFOL 20 MG: 10 INJECTION, EMULSION INTRAVENOUS at 09:07

## 2019-07-30 RX ADMIN — SODIUM CHLORIDE: 9 INJECTION, SOLUTION INTRAVENOUS at 07:55

## 2019-07-30 RX ADMIN — PROPOFOL 20 MG: 10 INJECTION, EMULSION INTRAVENOUS at 09:14

## 2019-07-30 RX ADMIN — PROPOFOL 20 MG: 10 INJECTION, EMULSION INTRAVENOUS at 09:12

## 2019-07-30 RX ADMIN — PROPOFOL 40 MG: 10 INJECTION, EMULSION INTRAVENOUS at 08:59

## 2019-07-30 RX ADMIN — PROPOFOL 20 MG: 10 INJECTION, EMULSION INTRAVENOUS at 09:01

## 2019-07-30 RX ADMIN — PROPOFOL 20 MG: 10 INJECTION, EMULSION INTRAVENOUS at 09:18

## 2019-07-30 RX ADMIN — PROPOFOL 20 MG: 10 INJECTION, EMULSION INTRAVENOUS at 09:20

## 2019-07-30 ASSESSMENT — PULMONARY FUNCTION TESTS
PIF_VALUE: 1
PIF_VALUE: 0
PIF_VALUE: 1
PIF_VALUE: 0
PIF_VALUE: 1

## 2019-07-30 ASSESSMENT — PAIN - FUNCTIONAL ASSESSMENT: PAIN_FUNCTIONAL_ASSESSMENT: 0-10

## 2019-07-30 ASSESSMENT — PAIN SCALES - GENERAL
PAINLEVEL_OUTOF10: 0
PAINLEVEL_OUTOF10: 0

## 2019-08-01 LAB — SURGICAL PATHOLOGY REPORT: NORMAL

## 2019-08-30 ENCOUNTER — OFFICE VISIT (OUTPATIENT)
Dept: FAMILY MEDICINE CLINIC | Age: 54
End: 2019-08-30
Payer: COMMERCIAL

## 2019-08-30 VITALS
HEART RATE: 87 BPM | RESPIRATION RATE: 16 BRPM | TEMPERATURE: 96.8 F | OXYGEN SATURATION: 96 % | DIASTOLIC BLOOD PRESSURE: 88 MMHG | BODY MASS INDEX: 38.77 KG/M2 | SYSTOLIC BLOOD PRESSURE: 126 MMHG | WEIGHT: 278 LBS

## 2019-08-30 DIAGNOSIS — Z12.5 SPECIAL SCREENING FOR MALIGNANT NEOPLASM OF PROSTATE: ICD-10-CM

## 2019-08-30 DIAGNOSIS — D58.2 ELEVATED HEMOGLOBIN (HCC): ICD-10-CM

## 2019-08-30 DIAGNOSIS — R19.5 POSITIVE COLORECTAL CANCER SCREENING USING COLOGUARD TEST: ICD-10-CM

## 2019-08-30 DIAGNOSIS — E78.00 PURE HYPERCHOLESTEROLEMIA: ICD-10-CM

## 2019-08-30 DIAGNOSIS — R39.89 ABNORMAL PROSTATE EXAM: ICD-10-CM

## 2019-08-30 DIAGNOSIS — R73.03 PREDIABETES: ICD-10-CM

## 2019-08-30 DIAGNOSIS — N20.0 KIDNEY STONES: ICD-10-CM

## 2019-08-30 DIAGNOSIS — I10 ESSENTIAL HYPERTENSION: Primary | ICD-10-CM

## 2019-08-30 DIAGNOSIS — R74.01 ELEVATED ALT MEASUREMENT: ICD-10-CM

## 2019-08-30 DIAGNOSIS — Z00.00 PREVENTATIVE HEALTH CARE: ICD-10-CM

## 2019-08-30 DIAGNOSIS — K42.9 UMBILICAL HERNIA WITHOUT OBSTRUCTION AND WITHOUT GANGRENE: ICD-10-CM

## 2019-08-30 PROCEDURE — 99214 OFFICE O/P EST MOD 30 MIN: CPT | Performed by: NURSE PRACTITIONER

## 2019-08-30 ASSESSMENT — ENCOUNTER SYMPTOMS
RESPIRATORY NEGATIVE: 1
GASTROINTESTINAL NEGATIVE: 1

## 2019-08-30 NOTE — PROGRESS NOTES
Shakila Morgan. Izzy Clark, APRN-CNP  [address]  [address] [address] [facility] [address] [facility] [address]  Dept: [phone]  Dept Fax: [phone]    HPI:   Johan Ellis is a 49 y.o. male who presents today for his medical conditions/complaintsas noted below. Johan Ellis is c/o of Hypertension and Diabetes    HPI  1. Essential hypertension  Current medication regimen to control condition includes: metoprolol 50 mg QD, losartan 100 mg QD, HCTZ 25 mg QD. Denies chest pain, palpitations, SOB, leg swelling. BP Readings from Last 3 Encounters:   08/30/19 126/88   07/30/19 (!) 142/79   07/30/19 (!) 117/90     2. Kidney stones  No known hx of kidney stones- noted on recent CT ABD/pelvis. No flank pain. 3. Elevated ALT measurement  Needs repeat lab work completed. 4. Elevated hemoglobin ([facility] [address]  Needs repeat lab work completed. 5. Prediabetes  Not monitoring blood sugars- con't to take metformin 500 mg QD. No changes in diet/exercise. Wt Readings from Last 3 Encounters:   08/30/19 278 lb (126.1 kg)   07/30/19 266 lb 15.6 oz (121.1 kg)   07/08/19 272 lb 12.8 oz (123.7 kg)     6. Pure hypercholesterolemia  Current medication regimen to control condition includes: Lipitor 10 mg QD.    7. Positive colorectal cancer screening using Cologuard test  Colonoscopy completed with Dr. Eri Weber- will repeat in 5 years. 8. Umbilical hernia  No pain to to any hernia sites. Narrative   EXAMINATION:   CT OF THE ABDOMEN AND PELVIS WITHOUT CONTRAST 7/10/2019 10:35 am       TECHNIQUE:   CT of the abdomen and pelvis was performed without the administration of   intravenous contrast. Multiplanar reformatted images are provided for review.    Dose modulation, iterative reconstruction, and/or weight based adjustment of   the mA/kV was utilized to reduce the radiation dose to as low as reasonably   achievable.       COMPARISON:   None.       HISTORY:   ORDERING SYSTEM PROVIDED 06/13/2019     06/13/2019     No results found for: TSH  Lab Results   Component Value Date    CHOL 204 06/13/2019    HDL 52 06/13/2019    LABA1C 6.1 06/13/2019     Assessment & Plan:      1. Essential hypertension  -Stable: Con't all medications as ordered, con't current tx plan    2. Kidney stones  -Asymptomatic  -Will refer for further evaluation and possible treatment:  - Linda Lorenzo MD, Urology, [place]  - Urinalysis Reflex to Culture; Future    3. Abnormal prostate exam- calcifications shown on CT ABD/pelvis 7-10-19  -ARSEN not completed, PSA WNL  - AFL - Sherie Verma MD, Urology, [place]  - Urinalysis Reflex to Culture; Future    4. Elevated ALT measurement  -Labs re-printed for completion to re-check    5. Elevated hemoglobin ([facility] [address]  -Labs re-printed for completion to re-check    6. Prediabetes  -Stable: Con't all medications as ordered, con't current tx plan  -Encouraged monitoring BS's, goal <100  -Lifestyle changes discussed and encouraged: Decrease fats, sugars, carbohydrates, and increase routine exercise  -Will re-check labs:  - Hemoglobin A1C; Future    7. Pure hypercholesterolemia  -Stable: Con't all medications as ordered, con't current tx plan  -Labs re-printed for completion to re-check    8. Umbilical hernia without obstruction and without gangrene  -Asymptomatic with all hernias noted on CT ABD/pelvis- will monitor, patient would like to consider getting umbilical hernia corrected in December 9. Positive colorectal cancer screening using Cologuard test  -Colonoscopy completed, recall in 5 years    Return in about 3 months (around 11/30/2019) for HTN, DM. Discussed use, benefit, and side effects of prescribed medications. Barriers to medication compliance addressed. All patient questions answered, patient voiced understanding. Sanjana Rhodes.  ART Baxter-CNP

## 2019-08-30 NOTE — PROGRESS NOTES
Visit Information    Have you changed or started any medications since your last visit including any over-the-counter medicines, vitamins, or herbal medicines? yes - see med list   Have you stopped taking any of your medications? Is so, why? -  no  Are you having any side effects from any of your medications? - no    Have you seen any other physician or provider since your last visit?  no   Have you had any other diagnostic tests since your last visit?  no   Have you been seen in the emergency room and/or had an admission in a hospital since we last saw you?  no   Have you had your routine dental cleaning in the past 6 months?  no     Do you have an active MyChart account? If no, what is the barrier?   Yes    Patient Care Team:  ART Cruz CNP as PCP - General (Nurse Practitioner Family)  ART Cruz CNP as PCP - [facility] Provider    Medical History Review  Past Medical, Family, and Social History reviewed and does contribute to the patient presenting condition    Health Maintenance   Topic Date Due    DTaP/Tdap/Td vaccine (1 - Tdap) 06/06/2020 (Originally 2/21/1985)    Shingles Vaccine (1 of 2) 06/06/2020 (Originally 2/21/2016)    HIV screen  06/06/2029 (Originally 2/21/1981)    Flu vaccine (1) 09/01/2019    A1C test (Diabetic or Prediabetic)  06/13/2020    Potassium monitoring  06/13/2020    Creatinine monitoring  06/13/2020    Lipid screen  06/13/2024    Colon cancer screen colonoscopy  07/30/2029    Pneumococcal 0-64 years Vaccine  Aged Out

## 2019-09-12 ENCOUNTER — OFFICE VISIT (OUTPATIENT)
Dept: FAMILY MEDICINE CLINIC | Age: 54
End: 2019-09-12
Payer: COMMERCIAL

## 2019-09-12 VITALS
SYSTOLIC BLOOD PRESSURE: 126 MMHG | HEART RATE: 71 BPM | DIASTOLIC BLOOD PRESSURE: 80 MMHG | BODY MASS INDEX: 37.8 KG/M2 | TEMPERATURE: 97.4 F | RESPIRATION RATE: 16 BRPM | WEIGHT: 271 LBS | OXYGEN SATURATION: 95 %

## 2019-09-12 DIAGNOSIS — M25.511 ACUTE PAIN OF RIGHT SHOULDER: Primary | ICD-10-CM

## 2019-09-12 DIAGNOSIS — M65.331 TRIGGER FINGER, RIGHT MIDDLE FINGER: ICD-10-CM

## 2019-09-12 PROCEDURE — 99214 OFFICE O/P EST MOD 30 MIN: CPT | Performed by: NURSE PRACTITIONER

## 2019-09-12 RX ORDER — PREDNISONE 10 MG/1
TABLET ORAL
Qty: 18 TABLET | Refills: 0 | Status: SHIPPED | OUTPATIENT
Start: 2019-09-12 | End: 2019-09-21

## 2019-09-12 ASSESSMENT — ENCOUNTER SYMPTOMS: SHORTNESS OF BREATH: 0

## 2019-09-12 NOTE — PROGRESS NOTES
fever.   Respiratory: Negative for shortness of breath. Cardiovascular: Negative for chest pain. Musculoskeletal: Positive for arthralgias and stiffness. Neurological: Negative for tingling and numbness. Objective:   /80   Pulse 71   Temp 97.4 °F (36.3 °C)   Resp 16   Wt 271 lb (122.9 kg)   SpO2 95%   BMI 37.80 kg/m²   Physical Exam   Constitutional: He is oriented to person, place, and time. He appears well-developed and well-nourished. Non-toxic appearance. He does not have a sickly appearance. HENT:   Head: Normocephalic and atraumatic. Right Ear: External ear normal.   Left Ear: External ear normal.   Eyes: Conjunctivae are normal.   Neck: Normal range of motion. Cardiovascular: Normal rate, regular rhythm and normal heart sounds. Exam reveals no gallop and no friction rub. No murmur heard. Pulses:       Radial pulses are 2+ on the right side. Pulmonary/Chest: Effort normal and breath sounds normal. No accessory muscle usage. No respiratory distress. He has no decreased breath sounds. He has no wheezes. He has no rhonchi. He has no rales. Musculoskeletal:        Right shoulder: He exhibits decreased range of motion, tenderness, bony tenderness, pain and decreased strength. He exhibits no swelling, no effusion, no crepitus, no deformity, no laceration, no spasm and normal pulse. Right hand: He exhibits deformity (Trigger finger noted to right middle finger). Normal sensation noted. Normal strength noted. Neurological: He is alert and oriented to person, place, and time. Skin: Skin is warm and dry. No rash noted. No erythema. Psychiatric: He has a normal mood and affect. His behavior is normal.   Nursing note and vitals reviewed.     Data:     Lab Results   Component Value Date     06/13/2019    K 4.8 06/13/2019     06/13/2019    CO2 30 06/13/2019    BUN 19 06/13/2019    CREATININE 1.06 06/13/2019    GLUCOSE 128 06/13/2019    LABALBU 4.3 06/13/2019 BILITOT 0.5 06/13/2019    ALKPHOS 89 06/13/2019    AST 37 06/13/2019    ALT 38 06/13/2019     Lab Results   Component Value Date    WBC 8.0 06/13/2019    HGB 18.7 06/13/2019    HCT 52.8 06/13/2019     06/13/2019     No results found for: TSH  Lab Results   Component Value Date    CHOL 204 06/13/2019    HDL 52 06/13/2019    PSA 1.51 06/13/2019    LABA1C 6.1 06/13/2019     Assessment & Plan:      1. Acute pain of right shoulder  -Add prednisone taper, con't tylenol/naproxen  -Start stretches as provided in AVS, heat PRN, start PT  -Will refer for further evaluation and possible treatment:  - Calxito Silver MD, Orthopedic Surgery, [place]  - [facility] - [place]  - predniSONE (DELTASONE) 10 MG tablet; Take 3 tablets together PO daily for 3 days, take 2 tablets together PO daily for 3 days, then take 1 tablet PO daily for 3 days. Dispense: 18 tablet; Refill: 0  -Take prednisone w/food in the AM- discussed side effects  - XR SHOULDER RIGHT (MIN 2 VIEWS); Future  -Consider MRI in the future    2. Trigger finger, right middle finger  -Will refer for further evaluation and possible treatment:  - Calixto Silver MD, Orthopedic Surgery, [place]    Return for next scheduled appointment or sooner if needed. Discussed use, benefit, and side effects of prescribed medications. Barriers to medication compliance addressed. All patient questions answered, patient voiced understanding. Amparo Shaw.  ART Tsang-CNP

## 2019-09-12 NOTE — PATIENT INSTRUCTIONS
of your injured arm up the wall as high as pain permits. (Don't shrug your shoulder up toward your ear.)  3. Hold your arm in that position for at least 15 to 30 seconds. 4. Slowly walk your fingers back down to where you started. 5. Repeat at least 2 to 4 times. Try to reach higher each time. Shoulder blade squeeze    1. Stand with your arms at your sides, and squeeze your shoulder blades together. Do not raise your shoulders up as you squeeze. 2. Hold 6 seconds. 3. Repeat 8 to 12 times. Scapular exercise: Arm reach    1. Lie flat on your back. This exercise is a very slight motion that starts with your arms raised (elbows straight, arms straight). 2. From this position, reach higher toward the meli or ceiling. Keep your elbows straight. All motion should be from your shoulder blade only. 3. Relax your arms back to where you started. 4. Repeat 8 to 12 times. Arm raise to the side    1. Slowly raise your injured arm to the side, with your thumb facing up. Raise your arm 60 degrees at the most (shoulder level is 90 degrees). 2. Hold the position for 3 to 5 seconds. Then lower your arm back to your side. If you need to, bring your \"good\" arm across your body and place it under the elbow as you lower your injured arm. Use your good arm to keep your injured arm from dropping down too fast.  3. Repeat 8 to 12 times. 4. When you first start out, don't hold any extra weight in your hand. As you get stronger, you may use a 1-pound to 2-pound dumbbell or a small can of food. Shoulder flexor and extensor exercise    1. Push forward (flex): Stand facing a wall or doorjamb, about 6 inches or less back. Hold your injured arm against your body. Make a closed fist with your thumb on top. Then gently push your hand forward into the wall with about 25% to 50% of your strength. Don't let your body move backward as you push. Hold for about 6 seconds. Relax for a few seconds. Repeat 8 to 12 times.   2. Push backward side.  3. Hold one end of the elastic band in the hand of the painful arm. 4. Slowly rotate your forearm toward your body until it touches your belly. Slowly move it back to where you started. 5. Keep your elbow and upper arm firmly tucked against the towel roll or at your side. 6. Repeat 8 to 12 times. External rotator strengthening exercise    1. Start by tying a piece of elastic exercise material to a doorknob. You can use surgical tubing or Thera-Band. (You may also hold one end of the band in each hand.)  2. Stand or sit with your shoulder relaxed and your elbow bent 90 degrees. Your upper arm should rest comfortably against your side. Squeeze a rolled towel between your elbow and your body for comfort. This will help keep your arm at your side. 3. Hold one end of the elastic band with the hand of the painful arm. 4. Start with your forearm across your belly. Slowly rotate the forearm out away from your body. Keep your elbow and upper arm tucked against the towel roll or the side of your body until you begin to feel tightness in your shoulder. Slowly move your arm back to where you started. 5. Repeat 8 to 12 times. Follow-up care is a key part of your treatment and safety. Be sure to make and go to all appointments, and call your doctor if you are having problems. It's also a good idea to know your test results and keep a list of the medicines you take. Where can you learn more? Go to [email or web address] org and sign in to your ePrep account. Enter Dany Jones in the [facility] box to learn more about \"Rotator Cuff: Exercises. \"     If you do not have an account, please click on the \"Sign Up Now\" link. Current as of: September 20, 2018  Content Version: 12.1  © 3096-6933 Healthwise, Incorporated. Care instructions adapted under license by [facility] ([facility]).  If you have questions about a medical condition or this instruction, always ask your healthcare professional. Pankaj Richardson,

## 2019-09-19 DIAGNOSIS — M25.511 ACUTE PAIN OF RIGHT SHOULDER: Primary | ICD-10-CM

## 2019-09-20 ENCOUNTER — OFFICE VISIT (OUTPATIENT)
Dept: ORTHOPEDIC SURGERY | Age: 54
End: 2019-09-20
Payer: COMMERCIAL

## 2019-09-20 VITALS — BODY MASS INDEX: 37.8 KG/M2 | HEIGHT: 71 IN | WEIGHT: 270 LBS

## 2019-09-20 DIAGNOSIS — M25.511 ACUTE PAIN OF RIGHT SHOULDER: Primary | ICD-10-CM

## 2019-09-20 DIAGNOSIS — M65.321 TRIGGER INDEX FINGER OF RIGHT HAND: ICD-10-CM

## 2019-09-20 PROCEDURE — 20550 NJX 1 TENDON SHEATH/LIGAMENT: CPT | Performed by: ORTHOPAEDIC SURGERY

## 2019-09-20 PROCEDURE — 99203 OFFICE O/P NEW LOW 30 MIN: CPT | Performed by: ORTHOPAEDIC SURGERY

## 2019-09-20 NOTE — LETTER
9/20/2019    Haile Lemon, APRN - CNP  [address] [facility] [address]  [place], [facility] [address]    RE: Destiney Diaz    Dear Dr. Adelia Wang,    Thank you for allowing me to participate in the care of Mr. India Shaw. I had the opportunity to evaluate the patient on 9/20/2019. Attached you will find my evaluation and recommendations. Thanks again for the confidence you have expressed in me by allowing my participation in the care of your patient. I will keep you apprised of further developments in the patients treatment course as it progresses. If I can be of further assistance in any fashion, please feel free to contact me at your convenience.     Sincerely,        Bassam Alcocer  Shoulder and Elbow Surgery

## 2019-09-23 RX ORDER — TRIAMCINOLONE ACETONIDE 40 MG/ML
40 INJECTION, SUSPENSION INTRA-ARTICULAR; INTRAMUSCULAR ONCE
Status: COMPLETED | OUTPATIENT
Start: 2019-09-23 | End: 2019-10-01

## 2019-09-23 RX ORDER — LIDOCAINE HYDROCHLORIDE 10 MG/ML
4 INJECTION, SOLUTION INFILTRATION; PERINEURAL ONCE
Status: COMPLETED | OUTPATIENT
Start: 2019-09-23 | End: 2019-10-01

## 2019-09-23 NOTE — PROGRESS NOTES
Orthopedic Shoulder Encounter Note     Chief complaint: Right shoulder pain    HPI: Angelica Ritchie is a 49 y.o.  right-hand dominant male who presents for evaluation of his right shoulder. He is been having pain for the past 6 months. He indicates that he was loading up a pickup truck and felt a pop in the shoulder and has been having pain ever since primarily localized to the superior aspect of the shoulder. He indicates that he feels something \"rollover\" which is extremely painful. He denies having any associated weakness. His pain is typically present with activity and any pressure applied to the superior aspect of the shoulder. He also has some nighttime positional pain. Finally describes having some stiffness in the shoulder. When his pain is at its worst it radiates across the lateral deltoid and into the posterior shoulder. He was started on a short course of prednisone 3 days ago by his primary care provider and that has helped some with the pain that he has. He also indicates today that he has been having triggering and locking of his right middle finger over the past 3 weeks. He denies any precipitating trauma or injury. He actually does not have any pain associated with this. Previous treatment:    NSAIDs: None    Physical Therapy: No    Injections: None    Surgeries: None    Review of Systems:     Constitution: no fever or chills   Pain level: 2/10  Musculoskeletal: As noted in the HPI   Neurologic: no neurologic symptoms    Past Medical History  Barry Guy  has a past medical history of Diabetes mellitus ([address] Hyperlipidemia, Hypertension, and Positive colorectal cancer screening using Cologuard test.    Past Surgical History  Barry Found  has a past surgical history that includes Hand surgery; [place] tooth extraction; and Colonoscopy (N/A, 7/30/2019).     Current Medications  Current Outpatient Medications   Medication Sig Dispense Refill    metFORMIN (GLUCOPHAGE) 500 MG tablet Take 1 tablet abd/ER     90 abd/IR      90 abd/IR     Crepitation  No    Crepitation No  Dyskenesia  No    Dyskenesia No      Muscle strength:    Right       Left    Deltoid   5    Deltoid   5  Supraspinatus  5    Supraspinatus  5  ER   5    ER   5  IR   5    IR   5    Special tests    Right   Rotator Cuff    Left    y   Painful arc    n   n   Pain with ER    n    y   Neer's     n    y   Hawkin's    n    n   Drop Arm    n  n   Lift off/Belly Press   n  n   ER Lag    n          TRISTAR [facility] Joint  y   AC tenderness   n  y   Cross-chest adduction  n       Labrum/biceps    y (equivocal)  [place]'s    n   n   Biceps sheer    n      n   Speed's/Yergason's   n    n   Tenderness Biceps Groove  n    n   Michael's    n         Instability  n   Ant Apprehension   n    n   Post Apprehension   n    n   Ant Load shift    n    n   Post Load shift   n   n   Sulcus     n  n   Generalized Laxity   n  n   Relocation test   n  n   Crank test     n  n   Edmond-superior escape  n       Imaging:  Xrays: 4 views of the right shoulder obtained on 9/20/19 were independently reviewed  Indications: Right shoulder pain  Findings: Normal glenohumeral joint space. Moderate acromioclavicular joint space loss associated with some osteophytic change. No obvious fracture, dislocation, or subluxation. Impression: Right shoulder radiograph with moderate acromioclavicular joint space degeneration. Impression/Plan:     Mona Nielsen is a 49 y.o. old male with right shoulder pain attributed to acromioclavicular joint arthrosis. I had a discussion with the patient today with regards to this and we discussed treatment options available to him. At this time I recommended a cortisone injection into the acromioclavicular joint. Due to the patient's size I believe he would benefit from an ultrasound-guided injection to increase the accuracy. As a result I will have him see my partner Dr. Marcy Lombard for this injection.   In addition today he also has a right middle finger

## 2019-10-01 RX ADMIN — LIDOCAINE HYDROCHLORIDE 4 ML: 10 INJECTION, SOLUTION INFILTRATION; PERINEURAL at 15:44

## 2019-10-01 RX ADMIN — TRIAMCINOLONE ACETONIDE 40 MG: 40 INJECTION, SUSPENSION INTRA-ARTICULAR; INTRAMUSCULAR at 15:45

## 2019-10-31 PROBLEM — M19.011 DEGENERATIVE JOINT DISEASE OF RIGHT ACROMIOCLAVICULAR JOINT: Status: ACTIVE | Noted: 2019-10-31

## 2019-11-07 ENCOUNTER — HOSPITAL ENCOUNTER (OUTPATIENT)
Dept: MRI IMAGING | Facility: CLINIC | Age: 54
Discharge: HOME | End: 2019-11-09
Payer: COMMERCIAL

## 2019-11-07 DIAGNOSIS — M19.011 DEGENERATIVE JOINT DISEASE OF RIGHT ACROMIOCLAVICULAR JOINT: ICD-10-CM

## 2019-11-07 PROBLEM — M75.81: Status: ACTIVE | Noted: 2019-11-07

## 2019-11-07 PROBLEM — M75.101 TEAR OF RIGHT SUPRASPINATUS TENDON: Status: ACTIVE | Noted: 2019-11-07

## 2019-11-07 PROCEDURE — 73221 MRI JOINT UPR EXTREM W/O DYE: CPT

## 2019-11-18 ENCOUNTER — PATIENT MESSAGE (OUTPATIENT)
Dept: FAMILY MEDICINE CLINIC | Age: 54
End: 2019-11-18

## 2019-12-03 ENCOUNTER — OFFICE VISIT (OUTPATIENT)
Dept: ORTHOPEDIC SURGERY | Age: 54
End: 2019-12-03
Payer: COMMERCIAL

## 2019-12-03 VITALS — WEIGHT: 260 LBS | HEIGHT: 71 IN | BODY MASS INDEX: 36.4 KG/M2

## 2019-12-03 DIAGNOSIS — M75.121 COMPLETE TEAR OF RIGHT ROTATOR CUFF, UNSPECIFIED WHETHER TRAUMATIC: Primary | ICD-10-CM

## 2019-12-03 DIAGNOSIS — M75.21 RIGHT BICIPITAL TENOSYNOVITIS: ICD-10-CM

## 2019-12-03 PROCEDURE — 99213 OFFICE O/P EST LOW 20 MIN: CPT | Performed by: ORTHOPAEDIC SURGERY

## 2019-12-05 DIAGNOSIS — M25.511 CHRONIC PAIN OF BOTH SHOULDERS: ICD-10-CM

## 2019-12-05 DIAGNOSIS — E78.00 PURE HYPERCHOLESTEROLEMIA: ICD-10-CM

## 2019-12-05 DIAGNOSIS — M25.512 CHRONIC PAIN OF BOTH SHOULDERS: ICD-10-CM

## 2019-12-05 DIAGNOSIS — F41.9 ANXIETY AND DEPRESSION: ICD-10-CM

## 2019-12-05 DIAGNOSIS — K21.9 GASTRIC REFLUX: ICD-10-CM

## 2019-12-05 DIAGNOSIS — F32.A ANXIETY AND DEPRESSION: ICD-10-CM

## 2019-12-05 DIAGNOSIS — D58.2 ELEVATED HEMOGLOBIN (HCC): ICD-10-CM

## 2019-12-05 DIAGNOSIS — I10 ESSENTIAL HYPERTENSION: ICD-10-CM

## 2019-12-05 DIAGNOSIS — R73.03 PREDIABETES: ICD-10-CM

## 2019-12-05 DIAGNOSIS — G89.29 CHRONIC PAIN OF BOTH SHOULDERS: ICD-10-CM

## 2019-12-05 RX ORDER — ATORVASTATIN CALCIUM 10 MG/1
10 TABLET, FILM COATED ORAL NIGHTLY
Qty: 90 TABLET | Refills: 1 | Status: SHIPPED | OUTPATIENT
Start: 2019-12-05 | End: 2020-06-23 | Stop reason: SDUPTHER

## 2019-12-05 RX ORDER — HYDROCHLOROTHIAZIDE 25 MG/1
25 TABLET ORAL DAILY
Qty: 90 TABLET | Refills: 1 | Status: SHIPPED | OUTPATIENT
Start: 2019-12-05 | End: 2020-06-23 | Stop reason: SDUPTHER

## 2019-12-05 RX ORDER — OMEPRAZOLE 20 MG/1
20 CAPSULE, DELAYED RELEASE ORAL DAILY
Qty: 90 CAPSULE | Refills: 1 | Status: SHIPPED | OUTPATIENT
Start: 2019-12-05 | End: 2020-06-23 | Stop reason: SDUPTHER

## 2019-12-05 RX ORDER — NAPROXEN 500 MG/1
500 TABLET ORAL 2 TIMES DAILY PRN
Qty: 180 TABLET | Refills: 0 | Status: ON HOLD
Start: 2019-12-05 | End: 2020-02-06 | Stop reason: HOSPADM

## 2019-12-05 RX ORDER — METOPROLOL TARTRATE 50 MG/1
50 TABLET, FILM COATED ORAL DAILY
Qty: 90 TABLET | Refills: 1 | Status: SHIPPED | OUTPATIENT
Start: 2019-12-05 | End: 2020-06-23 | Stop reason: SDUPTHER

## 2019-12-05 RX ORDER — ASPIRIN 81 MG/1
81 TABLET ORAL DAILY
Qty: 90 TABLET | Refills: 1 | Status: SHIPPED | OUTPATIENT
Start: 2019-12-05 | End: 2020-06-23 | Stop reason: SDUPTHER

## 2019-12-05 RX ORDER — LOSARTAN POTASSIUM 100 MG/1
100 TABLET ORAL DAILY
Qty: 90 TABLET | Refills: 1 | Status: SHIPPED | OUTPATIENT
Start: 2019-12-05 | End: 2020-06-23 | Stop reason: SDUPTHER

## 2019-12-11 ENCOUNTER — OFFICE VISIT (OUTPATIENT)
Dept: FAMILY MEDICINE CLINIC | Age: 54
End: 2019-12-11
Payer: COMMERCIAL

## 2019-12-11 VITALS
WEIGHT: 281 LBS | OXYGEN SATURATION: 95 % | TEMPERATURE: 96.5 F | BODY MASS INDEX: 39.19 KG/M2 | RESPIRATION RATE: 16 BRPM | SYSTOLIC BLOOD PRESSURE: 128 MMHG | HEART RATE: 77 BPM | DIASTOLIC BLOOD PRESSURE: 76 MMHG

## 2019-12-11 DIAGNOSIS — F32.A ANXIETY AND DEPRESSION: ICD-10-CM

## 2019-12-11 DIAGNOSIS — R39.89 ABNORMAL PROSTATE EXAM: ICD-10-CM

## 2019-12-11 DIAGNOSIS — D58.2 ELEVATED HEMOGLOBIN (HCC): ICD-10-CM

## 2019-12-11 DIAGNOSIS — R73.03 PREDIABETES: ICD-10-CM

## 2019-12-11 DIAGNOSIS — M75.121 COMPLETE TEAR OF RIGHT ROTATOR CUFF, UNSPECIFIED WHETHER TRAUMATIC: ICD-10-CM

## 2019-12-11 DIAGNOSIS — E78.00 PURE HYPERCHOLESTEROLEMIA: ICD-10-CM

## 2019-12-11 DIAGNOSIS — I10 ESSENTIAL HYPERTENSION: ICD-10-CM

## 2019-12-11 DIAGNOSIS — F41.9 ANXIETY AND DEPRESSION: ICD-10-CM

## 2019-12-11 DIAGNOSIS — M19.011 DEGENERATIVE JOINT DISEASE OF RIGHT ACROMIOCLAVICULAR JOINT: Primary | ICD-10-CM

## 2019-12-11 DIAGNOSIS — M65.331 TRIGGER FINGER, RIGHT MIDDLE FINGER: ICD-10-CM

## 2019-12-11 DIAGNOSIS — N20.0 KIDNEY STONES: ICD-10-CM

## 2019-12-11 DIAGNOSIS — R74.01 ELEVATED ALT MEASUREMENT: ICD-10-CM

## 2019-12-11 PROCEDURE — 99214 OFFICE O/P EST MOD 30 MIN: CPT | Performed by: NURSE PRACTITIONER

## 2019-12-11 ASSESSMENT — ENCOUNTER SYMPTOMS
WHEEZING: 0
COUGH: 0
GASTROINTESTINAL NEGATIVE: 1
SHORTNESS OF BREATH: 0

## 2020-01-13 ENCOUNTER — ANESTHESIA EVENT (OUTPATIENT)
Dept: OPERATING ROOM | Age: 55
End: 2020-01-13
Payer: COMMERCIAL

## 2020-01-17 ENCOUNTER — HOSPITAL ENCOUNTER (OUTPATIENT)
Age: 55
Discharge: HOME | End: 2020-01-17
Payer: COMMERCIAL

## 2020-01-17 LAB
-: NORMAL
ABSOLUTE EOS #: 0.37 K/UL (ref 0–0.44)
ABSOLUTE IMMATURE GRANULOCYTE: 0.03 K/UL (ref 0–0.3)
ABSOLUTE LYMPH #: 3.14 K/UL (ref 1.1–3.7)
ABSOLUTE MONO #: 0.94 K/UL (ref 0.1–1.2)
ALBUMIN SERPL-MCNC: 4.2 G/DL (ref 3.5–5.2)
ALBUMIN/GLOBULIN RATIO: 1.4 (ref 1–2.5)
ALP BLD-CCNC: 86 U/L (ref 40–129)
ALT SERPL-CCNC: 60 U/L (ref 5–41)
AMORPHOUS: NORMAL
ANION GAP SERPL CALCULATED.3IONS-SCNC: 16 MMOL/L (ref 9–17)
AST SERPL-CCNC: 37 U/L
BACTERIA: NORMAL
BASOPHILS # BLD: 2 % (ref 0–2)
BASOPHILS ABSOLUTE: 0.13 K/UL (ref 0–0.2)
BILIRUB SERPL-MCNC: 0.48 MG/DL (ref 0.3–1.2)
BILIRUBIN URINE: NEGATIVE
BUN BLDV-MCNC: 11 MG/DL (ref 6–20)
BUN/CREAT BLD: ABNORMAL (ref 9–20)
CALCIUM SERPL-MCNC: 9.5 MG/DL (ref 8.6–10.4)
CASTS UA: NORMAL /LPF (ref 0–8)
CHLORIDE BLD-SCNC: 100 MMOL/L (ref 98–107)
CHOLESTEROL/HDL RATIO: 3.4
CHOLESTEROL: 171 MG/DL
CO2: 24 MMOL/L (ref 20–31)
COLOR: YELLOW
COMMENT UA: ABNORMAL
CREAT SERPL-MCNC: 0.79 MG/DL (ref 0.7–1.2)
CRYSTALS, UA: NORMAL /HPF
DIFFERENTIAL TYPE: NORMAL
EOSINOPHILS RELATIVE PERCENT: 4 % (ref 1–4)
EPITHELIAL CELLS UA: NORMAL /HPF (ref 0–5)
ESTIMATED AVERAGE GLUCOSE: 134 MG/DL
GFR AFRICAN AMERICAN: >60 ML/MIN
GFR NON-AFRICAN AMERICAN: >60 ML/MIN
GFR SERPL CREATININE-BSD FRML MDRD: ABNORMAL ML/MIN/{1.73_M2}
GFR SERPL CREATININE-BSD FRML MDRD: ABNORMAL ML/MIN/{1.73_M2}
GLUCOSE BLD-MCNC: 131 MG/DL (ref 70–99)
GLUCOSE URINE: NEGATIVE
HBA1C MFR BLD: 6.3 % (ref 4–6)
HCT VFR BLD CALC: 49 % (ref 40.7–50.3)
HDLC SERPL-MCNC: 51 MG/DL
HEMOGLOBIN: 16.6 G/DL (ref 13–17)
IMMATURE GRANULOCYTES: 0 %
KETONES, URINE: NEGATIVE
LDL CHOLESTEROL: 99 MG/DL (ref 0–130)
LEUKOCYTE ESTERASE, URINE: NEGATIVE
LYMPHOCYTES # BLD: 38 % (ref 24–43)
MCH RBC QN AUTO: 33.1 PG (ref 25.2–33.5)
MCHC RBC AUTO-ENTMCNC: 33.9 G/DL (ref 28.4–34.8)
MCV RBC AUTO: 97.6 FL (ref 82.6–102.9)
MONOCYTES # BLD: 11 % (ref 3–12)
MUCUS: NORMAL
NITRITE, URINE: NEGATIVE
NRBC AUTOMATED: 0 PER 100 WBC
OTHER OBSERVATIONS UA: NORMAL
PDW BLD-RTO: 12.4 % (ref 11.8–14.4)
PH UA: 5.5 (ref 5–8)
PLATELET # BLD: 244 K/UL (ref 138–453)
PLATELET ESTIMATE: NORMAL
PMV BLD AUTO: 11.4 FL (ref 8.1–13.5)
POTASSIUM SERPL-SCNC: 4 MMOL/L (ref 3.7–5.3)
PROSTATE SPECIFIC ANTIGEN: 1.33 UG/L
PROTEIN UA: NEGATIVE
RBC # BLD: 5.02 M/UL (ref 4.21–5.77)
RBC # BLD: NORMAL 10*6/UL
RBC UA: NORMAL /HPF (ref 0–4)
RENAL EPITHELIAL, UA: NORMAL /HPF
SEG NEUTROPHILS: 45 % (ref 36–65)
SEGMENTED NEUTROPHILS ABSOLUTE COUNT: 3.74 K/UL (ref 1.5–8.1)
SODIUM BLD-SCNC: 140 MMOL/L (ref 135–144)
SPECIFIC GRAVITY UA: 1.03 (ref 1–1.03)
TOTAL PROTEIN: 7.3 G/DL (ref 6.4–8.3)
TRICHOMONAS: NORMAL
TRIGL SERPL-MCNC: 106 MG/DL
TURBIDITY: ABNORMAL
URINE HGB: NEGATIVE
UROBILINOGEN, URINE: NORMAL
VLDLC SERPL CALC-MCNC: NORMAL MG/DL (ref 1–30)
WBC # BLD: 8.4 K/UL (ref 3.5–11.3)
WBC # BLD: NORMAL 10*3/UL
WBC UA: NORMAL /HPF (ref 0–5)
YEAST: NORMAL

## 2020-01-17 PROCEDURE — 84153 ASSAY OF PSA TOTAL: CPT

## 2020-01-17 PROCEDURE — 36415 COLL VENOUS BLD VENIPUNCTURE: CPT

## 2020-01-17 PROCEDURE — 81001 URINALYSIS AUTO W/SCOPE: CPT

## 2020-01-17 PROCEDURE — 83036 HEMOGLOBIN GLYCOSYLATED A1C: CPT

## 2020-01-17 PROCEDURE — 85025 COMPLETE CBC W/AUTO DIFF WBC: CPT

## 2020-01-17 PROCEDURE — 80061 LIPID PANEL: CPT

## 2020-01-17 PROCEDURE — 80053 COMPREHEN METABOLIC PANEL: CPT

## 2020-01-21 ENCOUNTER — OFFICE VISIT (OUTPATIENT)
Dept: UROLOGY | Age: 55
End: 2020-01-21
Payer: COMMERCIAL

## 2020-01-21 VITALS
HEIGHT: 71 IN | BODY MASS INDEX: 39.2 KG/M2 | WEIGHT: 280 LBS | DIASTOLIC BLOOD PRESSURE: 88 MMHG | TEMPERATURE: 98.3 F | HEART RATE: 69 BPM | SYSTOLIC BLOOD PRESSURE: 136 MMHG

## 2020-01-21 PROCEDURE — G8417 CALC BMI ABV UP PARAM F/U: HCPCS | Performed by: UROLOGY

## 2020-01-21 PROCEDURE — 99204 OFFICE O/P NEW MOD 45 MIN: CPT | Performed by: UROLOGY

## 2020-01-21 PROCEDURE — 3017F COLORECTAL CA SCREEN DOC REV: CPT | Performed by: UROLOGY

## 2020-01-21 PROCEDURE — G8427 DOCREV CUR MEDS BY ELIG CLIN: HCPCS | Performed by: UROLOGY

## 2020-01-21 PROCEDURE — 1036F TOBACCO NON-USER: CPT | Performed by: UROLOGY

## 2020-01-21 PROCEDURE — G8484 FLU IMMUNIZE NO ADMIN: HCPCS | Performed by: UROLOGY

## 2020-01-21 ASSESSMENT — ENCOUNTER SYMPTOMS
EYE REDNESS: 0
SHORTNESS OF BREATH: 0
BACK PAIN: 0
NAUSEA: 0
WHEEZING: 0
EYE PAIN: 0
COUGH: 0
COLOR CHANGE: 0
VOMITING: 0
ABDOMINAL PAIN: 0

## 2020-01-21 NOTE — LETTER
[facility]  [facility] - [address]  Dept: [phone]  Dept Fax: [phone]        1/21/20    Patient: Brett Leon  YOB: 1965    Dear ART Jackson CNP,    I had the pleasure of seeing one of your patients, Ashu Caldwell today in the office today. Below are the relevant portions of my assessment and plan of care. IMPRESSION:  1. Kidney stones        PLAN:  He has non obstructing stones in both kidneys. Will need treatment. Will obtain a KUB  Likely will need ESWL. Prescriptions Ordered:  No orders of the defined types were placed in this encounter. Orders Placed:  No orders of the defined types were placed in this encounter. Thank you for allowing me to participate in the care of this patient. I will keep you updated on this patient's follow up and I look forward to serving you and your patients again in the future.         Semaj Schafer MD

## 2020-01-21 NOTE — PROGRESS NOTES
Review of Systems   Constitutional: Negative for activity change, chills and fever. Eyes: Negative for pain, redness and visual disturbance. Respiratory: Negative for cough, shortness of breath and wheezing. Cardiovascular: Negative for chest pain and leg swelling. Gastrointestinal: Negative for abdominal pain, nausea and vomiting. Genitourinary: Negative for difficulty urinating, discharge, dysuria, flank pain, frequency, hematuria, scrotal swelling and testicular pain. Musculoskeletal: Positive for myalgias (shoulder pain). Negative for back pain and joint swelling. Skin: Negative for color change and rash. Neurological: Negative for dizziness, tremors and numbness. Hematological: Negative for adenopathy. Does not bruise/bleed easily.
typically get up at night to uriniate?: 1 Time  TOTAL I-PSS SCORE[de-identified] 1  How would you feel if you were to spend the rest of your life with your urinary condition?: Pleased    Last BUN and creatinine:  Lab Results   Component Value Date    BUN 11 01/17/2020     Lab Results   Component Value Date    CREATININE 0.79 01/17/2020       Additional Lab/Culture results: none    Imaging Reviewed during this Office Visit: CT images reviewed, large non obstructign stones noted. (results were independently reviewed by physician and radiology report verified)    PAST MEDICAL, FAMILY AND SOCIAL HISTORY:  Past Medical History:   Diagnosis Date    Diabetes mellitus ([facility] [address]     Hyperlipidemia     Hypertension     Positive colorectal cancer screening using Cologuard test 06/2019     Past Surgical History:   Procedure Laterality Date    COLONOSCOPY N/A 7/30/2019    COLORECTAL CANCER SCREENING, NOT HIGH RISK performed by Aracely Monroe MD at [address] [place] [address]       No family history on file.   Outpatient Medications Marked as Taking for the 1/21/20 encounter (Office Visit) with Kanchan Millard MD   Medication Sig Dispense Refill    metFORMIN (GLUCOPHAGE) 500 MG tablet Take 1 tablet by mouth daily (with breakfast) 90 tablet 1    atorvastatin (LIPITOR) 10 MG tablet Take 1 tablet by mouth nightly 90 tablet 1    aspirin EC 81 MG EC tablet Take 1 tablet by mouth daily 90 tablet 1    naproxen (NAPROSYN) 500 MG tablet Take 1 tablet by mouth 2 times daily as needed for Pain 180 tablet 0    omeprazole (PRILOSEC) 20 MG delayed release capsule Take 1 capsule by mouth Daily 90 capsule 1    metoprolol tartrate (LOPRESSOR) 50 MG tablet Take 1 tablet by mouth daily 90 tablet 1    losartan (COZAAR) 100 MG tablet Take 1 tablet by mouth daily 90 tablet 1    hydrochlorothiazide (HYDRODIURIL) 25 MG tablet Take 1 tablet by mouth daily 90 tablet 1    sertraline (ZOLOFT) 50 MG tablet Take 1 tablet

## 2020-01-22 ENCOUNTER — HOSPITAL ENCOUNTER (OUTPATIENT)
Dept: GENERAL RADIOLOGY | Age: 55
Discharge: HOME | End: 2020-01-24
Payer: COMMERCIAL

## 2020-01-22 ENCOUNTER — HOSPITAL ENCOUNTER (OUTPATIENT)
Age: 55
Discharge: HOME | End: 2020-01-24
Payer: COMMERCIAL

## 2020-01-22 PROCEDURE — 74018 RADEX ABDOMEN 1 VIEW: CPT

## 2020-01-24 ENCOUNTER — HOSPITAL ENCOUNTER (OUTPATIENT)
Dept: PREADMISSION TESTING | Age: 55
Discharge: HOME | End: 2020-01-28
Payer: COMMERCIAL

## 2020-01-24 VITALS
SYSTOLIC BLOOD PRESSURE: 155 MMHG | OXYGEN SATURATION: 96 % | HEART RATE: 78 BPM | BODY MASS INDEX: 40.09 KG/M2 | WEIGHT: 280 LBS | TEMPERATURE: 96.6 F | RESPIRATION RATE: 20 BRPM | DIASTOLIC BLOOD PRESSURE: 105 MMHG | HEIGHT: 70 IN

## 2020-01-24 PROCEDURE — 93005 ELECTROCARDIOGRAM TRACING: CPT | Performed by: NURSE PRACTITIONER

## 2020-01-24 ASSESSMENT — PAIN DESCRIPTION - LOCATION: LOCATION: SHOULDER

## 2020-01-24 ASSESSMENT — PAIN DESCRIPTION - PAIN TYPE: TYPE: ACUTE PAIN

## 2020-01-24 ASSESSMENT — PAIN DESCRIPTION - FREQUENCY: FREQUENCY: INTERMITTENT

## 2020-01-24 ASSESSMENT — PAIN DESCRIPTION - DESCRIPTORS: DESCRIPTORS: BURNING

## 2020-01-24 ASSESSMENT — PAIN - FUNCTIONAL ASSESSMENT: PAIN_FUNCTIONAL_ASSESSMENT: PREVENTS OR INTERFERES SOME ACTIVE ACTIVITIES AND ADLS

## 2020-01-24 ASSESSMENT — PAIN SCALES - GENERAL: PAINLEVEL_OUTOF10: 4

## 2020-01-24 ASSESSMENT — PAIN DESCRIPTION - ORIENTATION: ORIENTATION: RIGHT

## 2020-01-25 LAB
EKG ATRIAL RATE: 72 BPM
EKG P AXIS: 56 DEGREES
EKG P-R INTERVAL: 162 MS
EKG Q-T INTERVAL: 380 MS
EKG QRS DURATION: 80 MS
EKG QTC CALCULATION (BAZETT): 416 MS
EKG R AXIS: 59 DEGREES
EKG T AXIS: 9 DEGREES
EKG VENTRICULAR RATE: 72 BPM

## 2020-01-29 ENCOUNTER — OFFICE VISIT (OUTPATIENT)
Dept: FAMILY MEDICINE CLINIC | Age: 55
End: 2020-01-29
Payer: COMMERCIAL

## 2020-01-29 VITALS
BODY MASS INDEX: 40.75 KG/M2 | SYSTOLIC BLOOD PRESSURE: 126 MMHG | WEIGHT: 284 LBS | TEMPERATURE: 96.8 F | HEART RATE: 79 BPM | OXYGEN SATURATION: 95 % | RESPIRATION RATE: 16 BRPM | DIASTOLIC BLOOD PRESSURE: 84 MMHG

## 2020-01-29 PROBLEM — F10.10 ALCOHOL USE DISORDER, MILD, ABUSE: Status: ACTIVE | Noted: 2020-01-29

## 2020-01-29 PROCEDURE — G8484 FLU IMMUNIZE NO ADMIN: HCPCS | Performed by: NURSE PRACTITIONER

## 2020-01-29 PROCEDURE — G8417 CALC BMI ABV UP PARAM F/U: HCPCS | Performed by: NURSE PRACTITIONER

## 2020-01-29 PROCEDURE — 3017F COLORECTAL CA SCREEN DOC REV: CPT | Performed by: NURSE PRACTITIONER

## 2020-01-29 PROCEDURE — G8427 DOCREV CUR MEDS BY ELIG CLIN: HCPCS | Performed by: NURSE PRACTITIONER

## 2020-01-29 PROCEDURE — 1036F TOBACCO NON-USER: CPT | Performed by: NURSE PRACTITIONER

## 2020-01-29 PROCEDURE — 99214 OFFICE O/P EST MOD 30 MIN: CPT | Performed by: NURSE PRACTITIONER

## 2020-01-29 ASSESSMENT — ENCOUNTER SYMPTOMS
SHORTNESS OF BREATH: 0
WHEEZING: 0
GASTROINTESTINAL NEGATIVE: 1
COUGH: 0

## 2020-01-29 NOTE — PROGRESS NOTES
Visit Information    Have you changed or started any medications since your last visit including any over-the-counter medicines, vitamins, or herbal medicines? no   Have you stopped taking any of your medications? Is so, why? -  no  Are you having any side effects from any of your medications? - no    Have you seen any other physician or provider since your last visit?  no   Have you had any other diagnostic tests since your last visit?  no   Have you been seen in the emergency room and/or had an admission in a hospital since we last saw you?  no   Have you had your routine dental cleaning in the past 6 months?  no     Do you have an active MyChart account? If no, what is the barrier?   Yes    Patient Care Team:  ART Galdamez CNP as PCP - General (Nurse Practitioner Family)  ART Galdamez CNP as PCP - [facility] Provider  Luna Cobos MD as Consulting Physician (Urology)    Medical History Review  Past Medical, Family, and Social History reviewed and does contribute to the patient presenting condition    Health Maintenance   Topic Date Due    DTaP/Tdap/Td vaccine (1 - Tdap) 06/06/2020 (Originally 2/21/1977)    Shingles Vaccine (1 of 2) 06/06/2020 (Originally 2/21/2016)    Flu vaccine (1) 12/11/2020 (Originally 9/1/2019)    HIV screen  06/06/2029 (Originally 2/21/1981)    A1C test (Diabetic or Prediabetic)  01/17/2021    Lipid screen  01/17/2021    Potassium monitoring  01/17/2021    Creatinine monitoring  01/17/2021    Colon cancer screen colonoscopy  07/30/2029    Pneumococcal 0-64 years Vaccine  Aged Out

## 2020-01-29 NOTE — PROGRESS NOTES
palpitations, SOB, leg swelling. 3. Anxiety and depression  Current medication regimen to control condition includes: Zoloft 50 mg QD- anxiety/depression stable. Denies any thoughts of harming themselves or others. On most days, he does feel that this keeps mood is stable. He is drinking about 12 beers every other day- does not drink on a daily basis. When he tries to cut back, he feels more aggravated, more agitated. He states if he goes 2 days without drinking ETOH, feels more edgy, denies tremors- would like to stop all together.      4. Prediabetes  Current medication regimen to control condition includes: Metformin 500 mg QD- has not been monitoring BS's. Not following healthy diet currently. 5. Pure hypercholesterolemia  Current medication regimen to control condition includes: Lipitor 10 mg QD.     6. Abnormal prostate exam- calcifications shown on CT ABD/pelvis 7-10-19  Referred to urology- will follow up again after surgery. Review of Systems    Review of Systems   Constitutional: Negative for chills and fever. HENT: Negative. Respiratory: Negative for cough, shortness of breath and wheezing. Cardiovascular: Negative for chest pain and palpitations. Gastrointestinal: Negative. Genitourinary: Negative for dysuria and frequency. Musculoskeletal: Positive for arthralgias. Neurological: Negative for dizziness. Hematological: Does not bruise/bleed easily. Psychiatric/Behavioral: Negative.           Objective:      Physical Exam  /84   Pulse 79   Temp 96.8 °F (36 °C)   Resp 16   Wt 284 lb (128.8 kg)   SpO2 95%   BMI 40.75 kg/m²     General Appearance:  Alert, cooperative, no distress, appears stated age   Head:  Normocephalic, without obvious abnormality, atraumatic   Eyes:  PERRL, conjunctiva/corneas clear, EOM's intact, fundi benign, both eyes   Ears:  Normal TM's and external ear canals, both ears   Nose: Nares normal, septum midline, mucosa normal, no drainage or sinus tenderness   Throat: Lips, mucosa, and tongue normal; teeth and gums normal   Neck: Supple, symmetrical, trachea midline, no adenopathy, thyroid: not enlarged, symmetric, no tenderness/mass/nodules, no carotid bruit or JVD   Back:   Symmetric, no curvature, ROM normal, no CVA tenderness   Lungs:   Clear to auscultation bilaterally, respirations unlabored   Chest Wall:  No tenderness or deformity   Heart:  Regular rate and rhythm, S1, S2 normal, no murmur, rub or gallop   Abdomen:   Soft, non-tender, bowel sounds active all four quadrants,  no masses, no organomegaly   Genitalia:  Deferred   Rectal:  Deferred   Extremities: Extremities normal, atraumatic, no cyanosis or edema   Pulses: 2+ and symmetric   Skin: Skin color, texture, turgor normal, no rashes or lesions   Lymph nodes: Cervical, supraclavicular, and axillary nodes normal   Neurologic: Normal     Cardiographics  ECG: normal sinus rhythm, no blocks or conduction defects, no ischemic changes  Echocardiogram: not done    Imaging  Chest X-Ray: Ordered    Lab Review   Hospital Outpatient Visit on 01/24/2020   Component Date Value    Ventricular Rate 01/24/2020 72     Atrial Rate 01/24/2020 72     P-R Interval 01/24/2020 162     QRS Duration 01/24/2020 80     Q-T Interval 01/24/2020 380     QTc Calculation (Bazett) 01/24/2020 416     P Axis 01/24/2020 56     R Axis 01/24/2020 59     T Axis 01/24/2020 9    Hospital Outpatient Visit on 01/17/2020   Component Date Value    PSA 01/17/2020 1.33     Color, UA 01/17/2020 YELLOW     Turbidity UA 01/17/2020 TURBID*    Glucose, Ur 01/17/2020 NEGATIVE     Bilirubin Urine 01/17/2020 NEGATIVE     Ketones, Urine 01/17/2020 NEGATIVE     Specific Mallory, UA 01/17/2020 1.026     Urine Hgb 01/17/2020 NEGATIVE     pH, UA 01/17/2020 5.5     Protein, UA 01/17/2020 NEGATIVE     Urobilinogen, Urine 01/17/2020 Normal     Nitrite, Urine 01/17/2020 NEGATIVE     Leukocyte Esterase, Urine 01/17/2020 NEGATIVE 01/17/2020 0 TO 2     RBC, UA 01/17/2020 0 TO 2     Casts UA 01/17/2020 2 TO 5 HYALINE Reference range defined for non-centrifuged specimen.  Crystals UA 01/17/2020 NOT REPORTED     Epithelial Cells UA 01/17/2020 0 TO 2     Renal Epithelial, Urine 01/17/2020 NOT REPORTED     Bacteria, UA 01/17/2020 NOT REPORTED     Mucus, UA 01/17/2020 NOT REPORTED     Trichomonas, UA 01/17/2020 NOT REPORTED     Amorphous, UA 01/17/2020 NOT REPORTED     Other Observations UA 01/17/2020 NOT REPORTED     Yeast, UA 01/17/2020 NOT REPORTED           Assessment & Plan:     1. Pre-operative clearance  -Cleared for surgery  -Will check CXR with slightly low SpO2- no s/s acute illness  -Patient does believe he was told to hold baby ASA 7 days prior to surgery- will double check with surgeon at appointment tomorrow  - XR CHEST STANDARD (2 VW); Future    2. Degenerative joint disease of right acromioclavicular joint  3. Complete tear of right rotator cuff, unspecified whether traumatic  -See instructions above    4. Elevated ALT measurement  -Avoid tylenol, ETOH  -Re-check labs in 3 months, obtain US soon  - US LIVER; Future  - Hepatic Function Panel; Future    5. Prediabetes  -HgbA1c increased from 6.1 to 6.3 in 6 months  -Increase metformin to BID dosing  - metFORMIN (GLUCOPHAGE) 500 MG tablet; Take 1 tablet by mouth 2 times daily (with meals)  Dispense: 180 tablet; Refill: 1    6. Anxiety and depression  7. Alcohol use disorder, mild, abuse  -Anxiety/depression stable  -Discussed decreasing ETOH intake- discussed concern for liver issues, increased risk for bleeding  -He is willing to start counseling, also given information for [facility], will try increasing Zoloft by 25 mg to 75 mg QD- call office if s/s worsen at any time, cut down slowly on ETOH intake  - sertraline (ZOLOFT) 50 MG tablet; Take 1.5 tablets by mouth daily  Dispense: 90 tablet; Refill: 1    8.  Kidney stones  -Stable: Con't all medications as ordered, con't

## 2020-01-31 ENCOUNTER — OFFICE VISIT (OUTPATIENT)
Dept: ORTHOPEDIC SURGERY | Age: 55
End: 2020-01-31
Payer: COMMERCIAL

## 2020-01-31 VITALS — WEIGHT: 284 LBS | HEIGHT: 70 IN | BODY MASS INDEX: 40.66 KG/M2

## 2020-01-31 PROCEDURE — 3017F COLORECTAL CA SCREEN DOC REV: CPT | Performed by: ORTHOPAEDIC SURGERY

## 2020-01-31 PROCEDURE — 1036F TOBACCO NON-USER: CPT | Performed by: ORTHOPAEDIC SURGERY

## 2020-01-31 PROCEDURE — 99212 OFFICE O/P EST SF 10 MIN: CPT | Performed by: ORTHOPAEDIC SURGERY

## 2020-01-31 PROCEDURE — G8427 DOCREV CUR MEDS BY ELIG CLIN: HCPCS | Performed by: ORTHOPAEDIC SURGERY

## 2020-01-31 PROCEDURE — G8417 CALC BMI ABV UP PARAM F/U: HCPCS | Performed by: ORTHOPAEDIC SURGERY

## 2020-01-31 PROCEDURE — G8484 FLU IMMUNIZE NO ADMIN: HCPCS | Performed by: ORTHOPAEDIC SURGERY

## 2020-01-31 RX ORDER — SODIUM CHLORIDE 0.9 % (FLUSH) 0.9 %
10 SYRINGE (ML) INJECTION PRN
Status: CANCELLED | OUTPATIENT
Start: 2020-01-31

## 2020-01-31 RX ORDER — HYDROCODONE BITARTRATE AND ACETAMINOPHEN 5; 325 MG/1; MG/1
1 TABLET ORAL EVERY 4 HOURS
Qty: 42 TABLET | Refills: 0 | Status: SHIPPED | OUTPATIENT
Start: 2020-01-31 | End: 2020-02-07

## 2020-01-31 RX ORDER — GABAPENTIN 300 MG/1
CAPSULE ORAL
Qty: 7 CAPSULE | Refills: 0 | Status: SHIPPED | OUTPATIENT
Start: 2020-01-31 | End: 2020-02-17

## 2020-01-31 RX ORDER — ONDANSETRON 4 MG/1
4 TABLET, FILM COATED ORAL DAILY PRN
Qty: 20 TABLET | Refills: 0 | Status: SHIPPED | OUTPATIENT
Start: 2020-01-31 | End: 2020-08-21

## 2020-01-31 RX ORDER — ACETAMINOPHEN 325 MG/1
1000 TABLET ORAL ONCE
Status: CANCELLED | OUTPATIENT
Start: 2020-01-31 | End: 2020-01-31

## 2020-01-31 RX ORDER — SODIUM CHLORIDE 0.9 % (FLUSH) 0.9 %
10 SYRINGE (ML) INJECTION EVERY 12 HOURS SCHEDULED
Status: CANCELLED | OUTPATIENT
Start: 2020-01-31

## 2020-01-31 NOTE — PROGRESS NOTES
HPI: Mr. Isi Sibley is a 78-year-old here today for his preoperative visit regarding his right shoulder. He has a full-thickness rotator cuff tear associated with biceps tenosynovitis. He has elected to forego attempts at conservative management and proceed with surgical intervention by way of an arthroscopic rotator cuff repair and possible open subpectoral biceps tenodesis. We did review his MRI study images together today which was completed on 11/7/2019. It demonstrates a full-thickness tear of the supraspinatus and leading edge of the infraspinatus with retraction to about the level of the glenohumeral interval.  There is a fair amount of edema along the biceps tendon in the bicipital groove. I once again had a discussion with the patient today with regards to the planned surgery, postoperative recovery course, and anticipated outcome. All questions were appropriately answered. He was provided with his prescriptions for postoperative analgesics. He has obtained appropriate preoperative medical clearance. We will proceed with surgery as currently scheduled.

## 2020-02-06 ENCOUNTER — ANESTHESIA (OUTPATIENT)
Dept: OPERATING ROOM | Age: 55
End: 2020-02-06
Payer: COMMERCIAL

## 2020-02-06 ENCOUNTER — HOSPITAL ENCOUNTER (OUTPATIENT)
Age: 55
Setting detail: OUTPATIENT SURGERY
Discharge: HOME | End: 2020-02-06
Attending: ORTHOPAEDIC SURGERY | Admitting: ORTHOPAEDIC SURGERY
Payer: COMMERCIAL

## 2020-02-06 VITALS
SYSTOLIC BLOOD PRESSURE: 169 MMHG | RESPIRATION RATE: 16 BRPM | HEIGHT: 71 IN | OXYGEN SATURATION: 88 % | TEMPERATURE: 98 F | BODY MASS INDEX: 38.67 KG/M2 | DIASTOLIC BLOOD PRESSURE: 98 MMHG | HEART RATE: 113 BPM | WEIGHT: 276.2 LBS

## 2020-02-06 VITALS — DIASTOLIC BLOOD PRESSURE: 95 MMHG | TEMPERATURE: 98.4 F | SYSTOLIC BLOOD PRESSURE: 143 MMHG | OXYGEN SATURATION: 90 %

## 2020-02-06 LAB — GLUCOSE BLD-MCNC: 114 MG/DL (ref 75–110)

## 2020-02-06 PROCEDURE — 2709999900 HC NON-CHARGEABLE SUPPLY: Performed by: ORTHOPAEDIC SURGERY

## 2020-02-06 PROCEDURE — 6360000002 HC RX W HCPCS: Performed by: NURSE ANESTHETIST, CERTIFIED REGISTERED

## 2020-02-06 PROCEDURE — 3600000004 HC SURGERY LEVEL 4 BASE: Performed by: ORTHOPAEDIC SURGERY

## 2020-02-06 PROCEDURE — 7100000010 HC PHASE II RECOVERY - FIRST 15 MIN: Performed by: ORTHOPAEDIC SURGERY

## 2020-02-06 PROCEDURE — 29828 SHO ARTHRS SRG BICP TENODSIS: CPT | Performed by: ORTHOPAEDIC SURGERY

## 2020-02-06 PROCEDURE — 2580000003 HC RX 258: Performed by: ANESTHESIOLOGY

## 2020-02-06 PROCEDURE — 6370000000 HC RX 637 (ALT 250 FOR IP): Performed by: ANESTHESIOLOGY

## 2020-02-06 PROCEDURE — 2580000003 HC RX 258: Performed by: ORTHOPAEDIC SURGERY

## 2020-02-06 PROCEDURE — 3700000001 HC ADD 15 MINUTES (ANESTHESIA): Performed by: ORTHOPAEDIC SURGERY

## 2020-02-06 PROCEDURE — 6360000002 HC RX W HCPCS: Performed by: ANESTHESIOLOGY

## 2020-02-06 PROCEDURE — 6370000000 HC RX 637 (ALT 250 FOR IP)

## 2020-02-06 PROCEDURE — 7100000001 HC PACU RECOVERY - ADDTL 15 MIN: Performed by: ORTHOPAEDIC SURGERY

## 2020-02-06 PROCEDURE — 2500000003 HC RX 250 WO HCPCS

## 2020-02-06 PROCEDURE — 3600000014 HC SURGERY LEVEL 4 ADDTL 15MIN: Performed by: ORTHOPAEDIC SURGERY

## 2020-02-06 PROCEDURE — 3700000000 HC ANESTHESIA ATTENDED CARE: Performed by: ORTHOPAEDIC SURGERY

## 2020-02-06 PROCEDURE — 64415 NJX AA&/STRD BRCH PLXS IMG: CPT | Performed by: ANESTHESIOLOGY

## 2020-02-06 PROCEDURE — 2500000003 HC RX 250 WO HCPCS: Performed by: NURSE ANESTHETIST, CERTIFIED REGISTERED

## 2020-02-06 PROCEDURE — 7100000000 HC PACU RECOVERY - FIRST 15 MIN: Performed by: ORTHOPAEDIC SURGERY

## 2020-02-06 PROCEDURE — L3650 SO 8 ABD RESTRAINT PRE OTS: HCPCS | Performed by: ORTHOPAEDIC SURGERY

## 2020-02-06 PROCEDURE — 82947 ASSAY GLUCOSE BLOOD QUANT: CPT

## 2020-02-06 PROCEDURE — 2720000010 HC SURG SUPPLY STERILE: Performed by: ORTHOPAEDIC SURGERY

## 2020-02-06 PROCEDURE — C1713 ANCHOR/SCREW BN/BN,TIS/BN: HCPCS | Performed by: ORTHOPAEDIC SURGERY

## 2020-02-06 PROCEDURE — 6360000002 HC RX W HCPCS: Performed by: ORTHOPAEDIC SURGERY

## 2020-02-06 PROCEDURE — 7100000011 HC PHASE II RECOVERY - ADDTL 15 MIN: Performed by: ORTHOPAEDIC SURGERY

## 2020-02-06 PROCEDURE — 6360000002 HC RX W HCPCS

## 2020-02-06 PROCEDURE — 29827 SHO ARTHRS SRG RT8TR CUF RPR: CPT | Performed by: ORTHOPAEDIC SURGERY

## 2020-02-06 DEVICE — ANCHOR SUT L14.7MM DIA5.5MM BIOCOMPOSITE FULL THRD W/ 1.3MM: Type: IMPLANTABLE DEVICE | Site: SHOULDER | Status: FUNCTIONAL

## 2020-02-06 DEVICE — ANCHOR SUT L24.5MM DIA4.75MM BIOCOMPOSITE SELF PUNCHING: Type: IMPLANTABLE DEVICE | Site: SHOULDER | Status: FUNCTIONAL

## 2020-02-06 RX ORDER — HYDRALAZINE HYDROCHLORIDE 20 MG/ML
5 INJECTION INTRAMUSCULAR; INTRAVENOUS EVERY 10 MIN PRN
Status: DISCONTINUED | OUTPATIENT
Start: 2020-02-06 | End: 2020-02-06 | Stop reason: HOSPADM

## 2020-02-06 RX ORDER — MIDAZOLAM HYDROCHLORIDE 1 MG/ML
2 INJECTION INTRAMUSCULAR; INTRAVENOUS
Status: COMPLETED | OUTPATIENT
Start: 2020-02-06 | End: 2020-02-06

## 2020-02-06 RX ORDER — NEOSTIGMINE METHYLSULFATE 5 MG/5 ML
SYRINGE (ML) INTRAVENOUS PRN
Status: DISCONTINUED | OUTPATIENT
Start: 2020-02-06 | End: 2020-02-06 | Stop reason: SDUPTHER

## 2020-02-06 RX ORDER — MORPHINE SULFATE 10 MG/ML
INJECTION, SOLUTION INTRAMUSCULAR; INTRAVENOUS PRN
Status: DISCONTINUED | OUTPATIENT
Start: 2020-02-06 | End: 2020-02-06 | Stop reason: SDUPTHER

## 2020-02-06 RX ORDER — GLYCOPYRROLATE 1 MG/5 ML
SYRINGE (ML) INTRAVENOUS PRN
Status: DISCONTINUED | OUTPATIENT
Start: 2020-02-06 | End: 2020-02-06 | Stop reason: SDUPTHER

## 2020-02-06 RX ORDER — ROCURONIUM BROMIDE 10 MG/ML
INJECTION, SOLUTION INTRAVENOUS PRN
Status: DISCONTINUED | OUTPATIENT
Start: 2020-02-06 | End: 2020-02-06 | Stop reason: SDUPTHER

## 2020-02-06 RX ORDER — FENTANYL CITRATE 50 UG/ML
INJECTION, SOLUTION INTRAMUSCULAR; INTRAVENOUS
Status: COMPLETED
Start: 2020-02-06 | End: 2020-02-06

## 2020-02-06 RX ORDER — LABETALOL HYDROCHLORIDE 5 MG/ML
INJECTION, SOLUTION INTRAVENOUS
Status: COMPLETED
Start: 2020-02-06 | End: 2020-02-06

## 2020-02-06 RX ORDER — HYDROCODONE BITARTRATE AND ACETAMINOPHEN 5; 325 MG/1; MG/1
TABLET ORAL
Status: DISCONTINUED
Start: 2020-02-06 | End: 2020-02-06 | Stop reason: HOSPADM

## 2020-02-06 RX ORDER — SODIUM CHLORIDE 0.9 % (FLUSH) 0.9 %
10 SYRINGE (ML) INJECTION EVERY 12 HOURS SCHEDULED
Status: DISCONTINUED | OUTPATIENT
Start: 2020-02-06 | End: 2020-02-06 | Stop reason: HOSPADM

## 2020-02-06 RX ORDER — BUPIVACAINE HYDROCHLORIDE AND EPINEPHRINE 5; 5 MG/ML; UG/ML
INJECTION, SOLUTION EPIDURAL; INTRACAUDAL; PERINEURAL
Status: DISCONTINUED
Start: 2020-02-06 | End: 2020-02-06 | Stop reason: WASHOUT

## 2020-02-06 RX ORDER — SODIUM CHLORIDE 9 MG/ML
INJECTION, SOLUTION INTRAVENOUS CONTINUOUS
Status: DISCONTINUED | OUTPATIENT
Start: 2020-02-06 | End: 2020-02-06 | Stop reason: HOSPADM

## 2020-02-06 RX ORDER — DEXAMETHASONE SODIUM PHOSPHATE 10 MG/ML
INJECTION, SOLUTION INTRAMUSCULAR; INTRAVENOUS PRN
Status: DISCONTINUED | OUTPATIENT
Start: 2020-02-06 | End: 2020-02-06 | Stop reason: SDUPTHER

## 2020-02-06 RX ORDER — BUPIVACAINE HYDROCHLORIDE 5 MG/ML
INJECTION, SOLUTION EPIDURAL; INTRACAUDAL
Status: DISCONTINUED
Start: 2020-02-06 | End: 2020-02-06 | Stop reason: HOSPADM

## 2020-02-06 RX ORDER — EPHEDRINE SULFATE/0.9% NACL/PF 50 MG/5 ML
SYRINGE (ML) INTRAVENOUS PRN
Status: DISCONTINUED | OUTPATIENT
Start: 2020-02-06 | End: 2020-02-06 | Stop reason: SDUPTHER

## 2020-02-06 RX ORDER — ONDANSETRON 2 MG/ML
INJECTION INTRAMUSCULAR; INTRAVENOUS PRN
Status: DISCONTINUED | OUTPATIENT
Start: 2020-02-06 | End: 2020-02-06 | Stop reason: SDUPTHER

## 2020-02-06 RX ORDER — SODIUM CHLORIDE 0.9 % (FLUSH) 0.9 %
10 SYRINGE (ML) INJECTION PRN
Status: DISCONTINUED | OUTPATIENT
Start: 2020-02-06 | End: 2020-02-06 | Stop reason: HOSPADM

## 2020-02-06 RX ORDER — ONDANSETRON 2 MG/ML
4 INJECTION INTRAMUSCULAR; INTRAVENOUS
Status: DISCONTINUED | OUTPATIENT
Start: 2020-02-06 | End: 2020-02-06 | Stop reason: HOSPADM

## 2020-02-06 RX ORDER — HYDROCODONE BITARTRATE AND ACETAMINOPHEN 5; 325 MG/1; MG/1
2 TABLET ORAL ONCE
Status: COMPLETED | OUTPATIENT
Start: 2020-02-06 | End: 2020-02-06

## 2020-02-06 RX ORDER — SODIUM CHLORIDE, SODIUM LACTATE, POTASSIUM CHLORIDE, CALCIUM CHLORIDE 600; 310; 30; 20 MG/100ML; MG/100ML; MG/100ML; MG/100ML
INJECTION, SOLUTION INTRAVENOUS CONTINUOUS
Status: DISCONTINUED | OUTPATIENT
Start: 2020-02-06 | End: 2020-02-06 | Stop reason: HOSPADM

## 2020-02-06 RX ORDER — LABETALOL 20 MG/4 ML (5 MG/ML) INTRAVENOUS SYRINGE
5 EVERY 10 MIN PRN
Status: DISCONTINUED | OUTPATIENT
Start: 2020-02-06 | End: 2020-02-06 | Stop reason: HOSPADM

## 2020-02-06 RX ORDER — FENTANYL CITRATE 50 UG/ML
50 INJECTION, SOLUTION INTRAMUSCULAR; INTRAVENOUS EVERY 5 MIN PRN
Status: DISCONTINUED | OUTPATIENT
Start: 2020-02-06 | End: 2020-02-06 | Stop reason: HOSPADM

## 2020-02-06 RX ORDER — MIDAZOLAM HYDROCHLORIDE 1 MG/ML
INJECTION INTRAMUSCULAR; INTRAVENOUS
Status: DISCONTINUED
Start: 2020-02-06 | End: 2020-02-06 | Stop reason: HOSPADM

## 2020-02-06 RX ORDER — FENTANYL CITRATE 50 UG/ML
25 INJECTION, SOLUTION INTRAMUSCULAR; INTRAVENOUS EVERY 5 MIN PRN
Status: DISCONTINUED | OUTPATIENT
Start: 2020-02-06 | End: 2020-02-06 | Stop reason: HOSPADM

## 2020-02-06 RX ORDER — FENTANYL CITRATE 50 UG/ML
INJECTION, SOLUTION INTRAMUSCULAR; INTRAVENOUS PRN
Status: DISCONTINUED | OUTPATIENT
Start: 2020-02-06 | End: 2020-02-06 | Stop reason: SDUPTHER

## 2020-02-06 RX ORDER — EPINEPHRINE 1 MG/ML
INJECTION, SOLUTION, CONCENTRATE INTRAVENOUS PRN
Status: DISCONTINUED | OUTPATIENT
Start: 2020-02-06 | End: 2020-02-06 | Stop reason: ALTCHOICE

## 2020-02-06 RX ORDER — LIDOCAINE HYDROCHLORIDE 10 MG/ML
INJECTION, SOLUTION EPIDURAL; INFILTRATION; INTRACAUDAL; PERINEURAL PRN
Status: DISCONTINUED | OUTPATIENT
Start: 2020-02-06 | End: 2020-02-06 | Stop reason: SDUPTHER

## 2020-02-06 RX ORDER — METOCLOPRAMIDE HYDROCHLORIDE 5 MG/ML
10 INJECTION INTRAMUSCULAR; INTRAVENOUS
Status: DISCONTINUED | OUTPATIENT
Start: 2020-02-06 | End: 2020-02-06 | Stop reason: HOSPADM

## 2020-02-06 RX ORDER — PROPOFOL 10 MG/ML
INJECTION, EMULSION INTRAVENOUS PRN
Status: DISCONTINUED | OUTPATIENT
Start: 2020-02-06 | End: 2020-02-06 | Stop reason: SDUPTHER

## 2020-02-06 RX ORDER — CEFAZOLIN SODIUM 2 G/50ML
SOLUTION INTRAVENOUS PRN
Status: DISCONTINUED | OUTPATIENT
Start: 2020-02-06 | End: 2020-02-06 | Stop reason: SDUPTHER

## 2020-02-06 RX ORDER — KETOROLAC TROMETHAMINE 30 MG/ML
INJECTION, SOLUTION INTRAMUSCULAR; INTRAVENOUS
Status: COMPLETED
Start: 2020-02-06 | End: 2020-02-06

## 2020-02-06 RX ORDER — ACETAMINOPHEN 500 MG
TABLET ORAL
Status: COMPLETED
Start: 2020-02-06 | End: 2020-02-06

## 2020-02-06 RX ORDER — KETOROLAC TROMETHAMINE 30 MG/ML
30 INJECTION, SOLUTION INTRAMUSCULAR; INTRAVENOUS ONCE
Status: COMPLETED | OUTPATIENT
Start: 2020-02-06 | End: 2020-02-06

## 2020-02-06 RX ADMIN — ROCURONIUM BROMIDE 20 MG: 10 INJECTION INTRAVENOUS at 08:40

## 2020-02-06 RX ADMIN — HYDROCODONE BITARTRATE AND ACETAMINOPHEN 2 TABLET: 5; 325 TABLET ORAL at 10:59

## 2020-02-06 RX ADMIN — FENTANYL CITRATE 50 MCG: 50 INJECTION INTRAMUSCULAR; INTRAVENOUS at 08:39

## 2020-02-06 RX ADMIN — MORPHINE SULFATE 10 MG: 10 INJECTION, SOLUTION INTRAMUSCULAR; INTRAVENOUS at 10:13

## 2020-02-06 RX ADMIN — FENTANYL CITRATE 100 MCG: 50 INJECTION, SOLUTION INTRAMUSCULAR; INTRAVENOUS at 07:12

## 2020-02-06 RX ADMIN — Medication 0.4 MG: at 09:51

## 2020-02-06 RX ADMIN — SODIUM CHLORIDE: 9 INJECTION, SOLUTION INTRAVENOUS at 06:23

## 2020-02-06 RX ADMIN — ROCURONIUM BROMIDE 50 MG: 10 INJECTION INTRAVENOUS at 07:49

## 2020-02-06 RX ADMIN — Medication 10 MG: at 08:09

## 2020-02-06 RX ADMIN — DEXAMETHASONE SODIUM PHOSPHATE 10 MG: 10 INJECTION, SOLUTION INTRAMUSCULAR; INTRAVENOUS at 07:49

## 2020-02-06 RX ADMIN — LIDOCAINE HYDROCHLORIDE 50 MG: 10 INJECTION, SOLUTION EPIDURAL; INFILTRATION; INTRACAUDAL; PERINEURAL at 07:49

## 2020-02-06 RX ADMIN — ROCURONIUM BROMIDE 20 MG: 10 INJECTION INTRAVENOUS at 09:33

## 2020-02-06 RX ADMIN — FENTANYL CITRATE 100 MCG: 50 INJECTION INTRAMUSCULAR; INTRAVENOUS at 08:22

## 2020-02-06 RX ADMIN — FENTANYL CITRATE 100 MCG: 50 INJECTION INTRAMUSCULAR; INTRAVENOUS at 07:49

## 2020-02-06 RX ADMIN — HYDRALAZINE HYDROCHLORIDE 5 MG: 20 INJECTION INTRAMUSCULAR; INTRAVENOUS at 11:35

## 2020-02-06 RX ADMIN — Medication 10 MG: at 08:15

## 2020-02-06 RX ADMIN — Medication 10 MG: at 08:12

## 2020-02-06 RX ADMIN — ONDANSETRON 4 MG: 2 INJECTION, SOLUTION INTRAMUSCULAR; INTRAVENOUS at 07:49

## 2020-02-06 RX ADMIN — HYDROMORPHONE HYDROCHLORIDE 0.5 MG: 1 INJECTION, SOLUTION INTRAMUSCULAR; INTRAVENOUS; SUBCUTANEOUS at 10:38

## 2020-02-06 RX ADMIN — FENTANYL CITRATE 50 MCG: 50 INJECTION INTRAMUSCULAR; INTRAVENOUS at 09:33

## 2020-02-06 RX ADMIN — LABETALOL HYDROCHLORIDE 5 MG: 5 INJECTION, SOLUTION INTRAVENOUS at 11:24

## 2020-02-06 RX ADMIN — FENTANYL CITRATE 50 MCG: 50 INJECTION INTRAMUSCULAR; INTRAVENOUS at 09:14

## 2020-02-06 RX ADMIN — MIDAZOLAM HYDROCHLORIDE 2 MG: 1 INJECTION, SOLUTION INTRAMUSCULAR; INTRAVENOUS at 07:11

## 2020-02-06 RX ADMIN — PROPOFOL 200 MG: 10 INJECTION, EMULSION INTRAVENOUS at 07:49

## 2020-02-06 RX ADMIN — ACETAMINOPHEN 1000 MG: 500 TABLET ORAL at 06:38

## 2020-02-06 RX ADMIN — Medication 10 MG: at 08:05

## 2020-02-06 RX ADMIN — Medication 3 MG: at 09:51

## 2020-02-06 RX ADMIN — SODIUM CHLORIDE: 9 INJECTION, SOLUTION INTRAVENOUS at 09:06

## 2020-02-06 RX ADMIN — HYDROMORPHONE HYDROCHLORIDE 0.5 MG: 1 INJECTION, SOLUTION INTRAMUSCULAR; INTRAVENOUS; SUBCUTANEOUS at 10:24

## 2020-02-06 RX ADMIN — KETOROLAC TROMETHAMINE 30 MG: 30 INJECTION, SOLUTION INTRAMUSCULAR; INTRAVENOUS at 10:32

## 2020-02-06 RX ADMIN — KETOROLAC TROMETHAMINE 30 MG: 30 INJECTION, SOLUTION INTRAMUSCULAR at 10:32

## 2020-02-06 RX ADMIN — Medication 10 MG: at 08:29

## 2020-02-06 RX ADMIN — HYDRALAZINE HYDROCHLORIDE 5 MG: 20 INJECTION INTRAMUSCULAR; INTRAVENOUS at 11:30

## 2020-02-06 RX ADMIN — CEFAZOLIN SODIUM 3 G: 2 SOLUTION INTRAVENOUS at 08:07

## 2020-02-06 ASSESSMENT — PULMONARY FUNCTION TESTS
PIF_VALUE: 24
PIF_VALUE: 22
PIF_VALUE: 23
PIF_VALUE: 21
PIF_VALUE: 23
PIF_VALUE: 32
PIF_VALUE: 23
PIF_VALUE: 21
PIF_VALUE: 25
PIF_VALUE: 23
PIF_VALUE: 28
PIF_VALUE: 3
PIF_VALUE: 22
PIF_VALUE: 21
PIF_VALUE: 24
PIF_VALUE: 33
PIF_VALUE: 24
PIF_VALUE: 22
PIF_VALUE: 18
PIF_VALUE: 21
PIF_VALUE: 22
PIF_VALUE: 1
PIF_VALUE: 23
PIF_VALUE: 22
PIF_VALUE: 27
PIF_VALUE: 24
PIF_VALUE: 25
PIF_VALUE: 23
PIF_VALUE: 23
PIF_VALUE: 22
PIF_VALUE: 17
PIF_VALUE: 24
PIF_VALUE: 9
PIF_VALUE: 22
PIF_VALUE: 26
PIF_VALUE: 29
PIF_VALUE: 27
PIF_VALUE: 22
PIF_VALUE: 42
PIF_VALUE: 18
PIF_VALUE: 23
PIF_VALUE: 10
PIF_VALUE: 21
PIF_VALUE: 22
PIF_VALUE: 24
PIF_VALUE: 22
PIF_VALUE: 22
PIF_VALUE: 28
PIF_VALUE: 21
PIF_VALUE: 25
PIF_VALUE: 22
PIF_VALUE: 24
PIF_VALUE: 21
PIF_VALUE: 26
PIF_VALUE: 23
PIF_VALUE: 22
PIF_VALUE: 26
PIF_VALUE: 27
PIF_VALUE: 27
PIF_VALUE: 18
PIF_VALUE: 23
PIF_VALUE: 1
PIF_VALUE: 23
PIF_VALUE: 26
PIF_VALUE: 20
PIF_VALUE: 21
PIF_VALUE: 24
PIF_VALUE: 26
PIF_VALUE: 1
PIF_VALUE: 21
PIF_VALUE: 24
PIF_VALUE: 23
PIF_VALUE: 25
PIF_VALUE: 23
PIF_VALUE: 18
PIF_VALUE: 18
PIF_VALUE: 25
PIF_VALUE: 21
PIF_VALUE: 23
PIF_VALUE: 26
PIF_VALUE: 6
PIF_VALUE: 1
PIF_VALUE: 35
PIF_VALUE: 28
PIF_VALUE: 24
PIF_VALUE: 23
PIF_VALUE: 39
PIF_VALUE: 23
PIF_VALUE: 0
PIF_VALUE: 1
PIF_VALUE: 26
PIF_VALUE: 19
PIF_VALUE: 18
PIF_VALUE: 1
PIF_VALUE: 23
PIF_VALUE: 2
PIF_VALUE: 22
PIF_VALUE: 9
PIF_VALUE: 22
PIF_VALUE: 24
PIF_VALUE: 26
PIF_VALUE: 10
PIF_VALUE: 37
PIF_VALUE: 24
PIF_VALUE: 21
PIF_VALUE: 21
PIF_VALUE: 22
PIF_VALUE: 32
PIF_VALUE: 10
PIF_VALUE: 26
PIF_VALUE: 22
PIF_VALUE: 23
PIF_VALUE: 21
PIF_VALUE: 23
PIF_VALUE: 25
PIF_VALUE: 24
PIF_VALUE: 24
PIF_VALUE: 25
PIF_VALUE: 24
PIF_VALUE: 27
PIF_VALUE: 18
PIF_VALUE: 23
PIF_VALUE: 23
PIF_VALUE: 26
PIF_VALUE: 9
PIF_VALUE: 25
PIF_VALUE: 22
PIF_VALUE: 26
PIF_VALUE: 22
PIF_VALUE: 20
PIF_VALUE: 22
PIF_VALUE: 24
PIF_VALUE: 22
PIF_VALUE: 27
PIF_VALUE: 18
PIF_VALUE: 18
PIF_VALUE: 24
PIF_VALUE: 11
PIF_VALUE: 22
PIF_VALUE: 24
PIF_VALUE: 26
PIF_VALUE: 20
PIF_VALUE: 22

## 2020-02-06 ASSESSMENT — PAIN SCALES - GENERAL
PAINLEVEL_OUTOF10: 7
PAINLEVEL_OUTOF10: 7
PAINLEVEL_OUTOF10: 10
PAINLEVEL_OUTOF10: 9
PAINLEVEL_OUTOF10: 10
PAINLEVEL_OUTOF10: 0

## 2020-02-06 ASSESSMENT — PAIN DESCRIPTION - PAIN TYPE
TYPE: SURGICAL PAIN
TYPE: SURGICAL PAIN

## 2020-02-06 ASSESSMENT — PAIN DESCRIPTION - LOCATION
LOCATION: SHOULDER
LOCATION: SHOULDER

## 2020-02-06 ASSESSMENT — PAIN - FUNCTIONAL ASSESSMENT: PAIN_FUNCTIONAL_ASSESSMENT: 0-10

## 2020-02-06 ASSESSMENT — PAIN DESCRIPTION - ORIENTATION: ORIENTATION: RIGHT

## 2020-02-06 NOTE — OP NOTE
Operative Report     Date of Procedure: 2/6/2020      Preop Diagnosis:   1. Right Rotator Cuff Tear (M75.121)  2. Right Biceps tenosynovitis     Postop Diagnosis:     1. Right Rotator Cuff Tear (M75.121)  2. Right Biceps tenosynovitis associated with a SLAP tear     Procedure:    1. Right Arthroscopic Rotator Cuff Repair (73590)  2. Right Arthroscopic Biceps Tenodesis     Surgeon: Gamaliel Saavedra MD     Assistant: La Nena Wasserman    Anesthesia: General    Blood Loss: Minimal    Findings: Full tear of the supraspinatus and infraspinatus, SLAP tear    Implants: Arthrex bio composite 5.5 mm corkscrew anchors x2, 4.75 mm bio composite self punching swivel lock anchor. Indications: Gilda Roberts is a 49 y.o. old male who presented with functionally limiting right shoulder pain despite non-operative measures. MRI confirmed the presence of a rotator cuff tear and biceps tenosynovitis. Following a discussion with the patient regarding his treatment options, he elected to proceed with an arthroscopic right shoulder rotator cuff repair with possible biceps tenodesis. he came to this decision after demonstrating a good understanding of our discussion. Risks, benefits, and alternatives were fully discussed. Postoperative limitations and limitations of the procedure were discussed in detail. The patient understood risks, alternatives, complications, & post-operative limitations and wishes to proceed. Operative Technique: Following appropriate identification of the patient and his operative extremity in the preoperative area, consent was reviewed with patient. Risks, benefits, and alternatives were discussed. All questions were answered. The anesthesia service attempted to administer right interscalene nerve block but were unable to do so.   Consequently after the patient was wheeled to the operating room and a general anesthetic administered, the anesthesia service secured his airway using an endotracheal tube and using aseptic technique I administered a 40 cc one-to-one mixture of Exparel and normal saline into the operative sites. The patient was carefully positioned and secured in the beach-chair position, padding all prominences. SCDs and TEDs were placed on both legs. The right upper extremity was prepped and draped in the usual sterile fashion. The patient finished a course of pre-operative antibiotics by way of 2 g of IV Ancef. A time out was performed during which the correct patient, surgical site, and planned procedure were identified and then confirmed by the circulating nurse and anesthetic team.     The joint was insuflated with 30cc saline and a standard posterior portal was established. Diagnostic arthroscopy revealed Full tear of the supraspinatus and infraspinatus, SLAP tear, intact chondral surfaces. An anterior portal was then established. An extensive debridement of frayed soft tissue and inflamed synovium was performed. The frayed edges of the rotator cuff were debrided to smooth surfaces. The biceps tendon was released and through an anterolateral fascial incision it was retrieved and grasping [place] sutures were placed in the tendon using #2 FiberWire. A section of the biceps tendon was then resected. My attention was directed to the subacromial space. Through a separate lateral fascial incision, the subacromial space was entered. A thorough bursectomy was performed. The rotator cuff was identified and noted to be a U-shaped tear with a longitudinal split at the junction of the supraspinatus and infraspinatus tendons. Consequently I placed a side to side stitch at this location and reapproximated the torn edges here. I performed appropriate releases in the subacromial space however the anterior portion of the rotator cuff tear was not very mobile however it was mobilizable to the medial edge of the footprint. The footprint of the rotator cuff was debrided to bleeding bone.  A 5.5 mm anchor was placed along the anterior articular margin of the footprint. Two horizontal mattress sutures were passed. An additional 5.5 mm anchor was placed along the posterior articular margin of the footprint and the double loaded sutures passed in horizontal mattress fashion . The anchor sutures from both anchors were then tied and individual suture limbs from both anchors were threaded together with the biceps tendon sutures into the anterior lateral row 4.75mm bio composite SwivelLock anchor. This resulted and a secure repair of the rotator cuff and biceps tenodesis. The repair was then reevaluated and found to be stable through a full range of motion. The camera was placed back into the joint, confirming restoration of the rotator cuff insertion with an air tight seal.      Incisions were closed using a 3-0 prolene followed by a sterile dressing by way of 4x4 gauze and Tegaderm. His arm was placed in a sling/abduction pillow. The patient was awoken, transferred to his bed and wheeled to recovery in stable condition.      Complications: None     Condition: Stable

## 2020-02-06 NOTE — ANESTHESIA PRE PROCEDURE
Department of Anesthesiology  Preprocedure Note       Name:  Kateryna Lang   Age:  49 y.o.  :  1965                                          MRN:  [id number]         Date:  2020      Surgeon: Ginny Nash):  Irene Britton MD    Procedure: SHOULDER ARTHROSCOPY ROTATOR CUFF REPAIR WITH ARTHREX (Right )  BICEPS TENDON REPAIR - OPEN SUBPEC BICEPS TENODESIS (Right Arm Upper)    Medications prior to admission:   Prior to Admission medications    Medication Sig Start Date End Date Taking? Authorizing Provider   HYDROcodone-acetaminophen (NORCO) 5-325 MG per tablet Take 1 tablet by mouth every 4 hours for 42 doses.  20 Yes Seth Alcocer MD   ondansetron (ZOFRAN) 4 MG tablet Take 1 tablet by mouth daily as needed for Nausea or Vomiting 20  Yes Seth Alcocer MD   gabapentin (NEURONTIN) 300 MG capsule Take 1 tab, Daily at night 20 Yes Seth Alcocer MD   sertraline (ZOLOFT) 50 MG tablet Take 1.5 tablets by mouth daily 20  Yes [facility], APRN - [facility]   metFORMIN (GLUCOPHAGE) 500 MG tablet Take 1 tablet by mouth 2 times daily (with meals) 20  Yes [facility], APRN - [facility]   atorvastatin (LIPITOR) 10 MG tablet Take 1 tablet by mouth nightly 19  Yes [facility], APRN - [facility]   aspirin EC 81 MG EC tablet Take 1 tablet by mouth daily 19  Yes [facility], APRN - [facility]   naproxen (NAPROSYN) 500 MG tablet Take 1 tablet by mouth 2 times daily as needed for Pain 19  Yes [facility], APRN - [facility]   omeprazole (PRILOSEC) 20 MG delayed release capsule Take 1 capsule by mouth Daily 19  Yes [facility], APRN - CNP   metoprolol tartrate (LOPRESSOR) 50 MG tablet Take 1 tablet by mouth daily 19  Yes [facility], APRN - [facility]   losartan (COZAAR) 100 MG tablet Take 1 tablet by mouth daily 19  Yes [facility], APRN - CNP   hydrochlorothiazide (HYDRODIURIL) 25 MG tablet Take 1 tablet by mouth daily 19  Yes [facility], APRN - CNP       Current medications:    Current Facility-Administered Medications   Medication Dose Route Frequency Provider Last Rate Last Dose    0.9 % sodium chloride infusion   Intravenous Continuous Leigh West  mL/hr at 02/06/20 2238      lactated ringers infusion   Intravenous Continuous Leigh West MD        sodium chloride flush 0.9 % injection 10 mL  10 mL Intravenous 2 times per day Leigh West MD        sodium chloride flush 0.9 % injection 10 mL  10 mL Intravenous PRN Leigh West MD        midazolam (VERSED) injection 2 mg  2 mg Intravenous Once PRN Leigh West MD        bupivacaine (PF) (MARCAINE) 0.5 % injection             bupivacaine liposome (EXPAREL) 1.3 % injection             ceFAZolin (ANCEF) IVPB             EPINEPHrine 1 MG/ML injection             midazolam (VERSED) 2 MG/2ML injection                Allergies:  No Known Allergies    Problem List:    Patient Active Problem List   Diagnosis Code    Essential hypertension I10    Acute pain of right shoulder M25.511    Gastric reflux K21.9    Class 2 severe obesity due to excess calories with serious comorbidity and body mass index (BMI) of 38.0 to 38.9 in adult ([facility] E66.01, Z68.38    Anxiety and depression F41.9, F32.9    Positive colorectal cancer screening using Cologuard test R19.5    Elevated ALT measurement R74.0    Prediabetes R73.03    Elevated hemoglobin ([facility] D58.2    Pure hypercholesterolemia O28.41    Umbilical hernia P18.3    Ventral hernia K43.9    Kidney stones N20.0    Right inguinal hernia K40.90    Trigger finger, right middle finger M65.331    Degenerative joint disease of right acromioclavicular joint M19.011    Tear of right supraspinatus tendon M75.101    Infraspinatus tendinitis, right M75.81    Complete tear of right rotator cuff M75.121    Right bicipital tenosynovitis M75.21    Abnormal prostate exam- calcifications shown on CT ABD/pelvis 7-10-19 R39.89    Alcohol use disorder, mild, abuse F10.10       Past Medical History:        Diagnosis Date    Arthritis     Diabetes mellitus ([facility]     GERD (gastroesophageal reflux disease)     Hyperlipidemia     Hypertension     Positive colorectal cancer screening using Cologuard test 2019       Past Surgical History:        Procedure Laterality Date    COLONOSCOPY N/A 2019    COLORECTAL CANCER SCREENING, NOT HIGH RISK performed by Hanh Toney MD at [facility] EXTRACTION         Social History:    Social History     Tobacco Use    Smoking status: Former Smoker     Packs/day: 2.00     Years: 35.00     Pack years: 70.00     Types: Cigarettes     Last attempt to quit:      Years since quittin.1    Smokeless tobacco: Never Used   Substance Use Topics    Alcohol use: Yes     Alcohol/week: 12.0 standard drinks     Types: 12 Cans of beer per week     Comment: Every other day                                Counseling given: Not Answered      Vital Signs (Current):   Vitals:    20 0543   Weight: 276 lb 3.2 oz (125.3 kg)   Height: 5' 11\" (1.803 m)                                              BP Readings from Last 3 Encounters:   20 126/84   20 (!) 155/105   20 136/88       NPO Status: Time of last liquid consumption: 1700                        Time of last solid consumption: 1700                        Date of last liquid consumption: 20                        Date of last solid food consumption: 20    BMI:   Wt Readings from Last 3 Encounters:   20 276 lb 3.2 oz (125.3 kg)   20 284 lb (128.8 kg)   20 284 lb (128.8 kg)     Body mass index is 38.52 kg/m².     CBC:   Lab Results   Component Value Date    WBC 8.4 2020    RBC 5.02 2020    HGB 16.6 2020    HCT 49.0 2020    MCV 97.6 2020    RDW 12.4 2020     2020       CMP:   Lab Results   Component Value Date     2020    K 4.0 2020     2020    CO2 24

## 2020-02-06 NOTE — H&P
ORTHOPEDIC INTERVAL H & P      HPI / Chief Complaint  Mariama Pederson is a 49 y.o. male with chronic right shoulder pain and weakness due to a rotator cuff tear and biceps tenosynovitis. He presents today for surgical intervention having elected to forgo continues attempts at conservative management. He confirms that he's had nothing to eat or drink since midnight. Past Medical History  Christopher Yoder  has a past medical history of Arthritis, Diabetes mellitus ([address] GERD (gastroesophageal reflux disease), Hyperlipidemia, Hypertension, and Positive colorectal cancer screening using Cologuard test.    Past Surgical History  Christopher Yoder  has a past surgical history that includes Hand surgery; [place] tooth extraction; and Colonoscopy (N/A, 7/30/2019). Current Medications  Current Facility-Administered Medications   Medication Dose Route Frequency Provider Last Rate Last Dose    0.9 % sodium chloride infusion   Intravenous Continuous Jojo Baez  mL/hr at 02/06/20 7881      lactated ringers infusion   Intravenous Continuous Jojo Baez MD        sodium chloride flush 0.9 % injection 10 mL  10 mL Intravenous 2 times per day Jojo Baez MD        sodium chloride flush 0.9 % injection 10 mL  10 mL Intravenous PRN Jojo Baez MD        midazolam (VERSED) injection 2 mg  2 mg Intravenous Once PRN Jojo Baez MD        bupivacaine (PF) (MARCAINE) 0.5 % injection             bupivacaine liposome (EXPAREL) 1.3 % injection             ceFAZolin (ANCEF) IVPB             EPINEPHrine 1 MG/ML injection             midazolam (VERSED) 2 MG/2ML injection                Allergies  Allergies have been reviewed. Christopher Yoder has No Known Allergies. Social History  Christopher Yoder  reports that he quit smoking about 7 years ago. His smoking use included cigarettes. He has a 70.00 pack-year smoking history. He has never used smokeless tobacco. He reports current alcohol use of about 12.0 standard drinks of alcohol per week.  He reports that he does

## 2020-02-06 NOTE — PROGRESS NOTES
Pt stated no one told him to take his Beta blocker pre op. Pt anxious, stating,\"If you're going to cancel me,do it now. \"

## 2020-02-06 NOTE — PROGRESS NOTES
Dr Robles Look, anesthesia, in to  instr pt how to breathe deeply. Inspirometer given & Dr Margaret Woodall demonstrated to pt how to use,taking deep breaths in, hold for a few seconds & release slowly through pursed lips. Pt. ret'd demonstration. P. Ox went up to 92%. Pt states his right shoulder pain is tolerable at an \"8\". Pt states, \"I was used to worse pain. \" Dr Robles Look back in to evaluate pt. P. Ox up to (95% on Room air after using inspirometer. IV removed. Assisted in dressing. Up to bathroom to try to void;unable to void. Discharged via wheelchair to car at [phone].

## 2020-02-06 NOTE — PROGRESS NOTES
Dr Jimmy Roberson in to see & miladys pt's shoulder. Dr Jimmy Roberson notified of pt not taking his Metoprolol.

## 2020-02-12 ENCOUNTER — TELEPHONE (OUTPATIENT)
Dept: UROLOGY | Age: 55
End: 2020-02-12

## 2020-02-17 ENCOUNTER — OFFICE VISIT (OUTPATIENT)
Dept: ORTHOPEDIC SURGERY | Age: 55
End: 2020-02-17

## 2020-02-17 VITALS — BODY MASS INDEX: 38.67 KG/M2 | WEIGHT: 276.24 LBS | HEIGHT: 71 IN

## 2020-02-17 PROCEDURE — 99024 POSTOP FOLLOW-UP VISIT: CPT | Performed by: ORTHOPAEDIC SURGERY

## 2020-02-17 NOTE — PROGRESS NOTES
Procedure: Right shoulder arthroscopic rotator cuff repair and biceps tenodesis  Date of procedure: 2/6/20    HPI:  Kelsey Yip is a 49 y.o. male who is approximately 2 weeks status post aforementioned procedure. Indicates that he is doing relatively well. His pain is been appropriately controlled. He is currently off of his prescription analgesics and is taking 2 Tylenols a day. He denies having any fevers, chills, sweats or any constitutional symptoms. He has been compliant with his pendulums and sling immobilization. Physical examination:  Evaluation of patient's right shoulder and upper extremity demonstrates his incisions to be clean, dry, intact. There is no warmth, erythema, wound dehiscence or drainage. Sensation is grossly intact light touch in all dermatomes and he has a 2+ radial pulse with brisk capillary refill in his fingers. Impression and plan:  Kelsey Yip is a 49 y.o. male who is approximately 2 weeks status post an arthroscopic right shoulder rotator cuff repair and biceps tenodesis. He is doing relatively well at this time. We reviewed his arthroscopic images. His sutures were taken out and Steri-Strips and clean dressings applied. He may now get his incisions wet in the shower. He is to continue on with his immobilization. He will also continue on with daily pendulum exercises 3 times a day. I'll see him back for reevaluation in 4 weeks but he was encouraged to return or call earlier with questions and/or concerns.

## 2020-02-26 ENCOUNTER — OFFICE VISIT (OUTPATIENT)
Dept: BEHAVIORAL/MENTAL HEALTH CLINIC | Age: 55
End: 2020-02-26
Payer: COMMERCIAL

## 2020-02-26 PROCEDURE — 1036F TOBACCO NON-USER: CPT | Performed by: SOCIAL WORKER

## 2020-02-26 PROCEDURE — 90791 PSYCH DIAGNOSTIC EVALUATION: CPT | Performed by: SOCIAL WORKER

## 2020-02-26 NOTE — PROGRESS NOTES
ADULT BEHAVIORAL HEALTH ASSESSMENT  AUGUSTINE Anderson  Licensed Independent      Visit Date: 2/26/2020   Time of appointment: 8am   Time spent with Patient: 60 minutes. This is patient's first appointment. Reason for Consult:  New Patient (alcohol use)     PCP: ART Hicks CNP      Pt provided informed consent for the behavioral health program. Discussed with patient model of service to include the limits of confidentiality (i.e. abuse reporting, suicide intervention, etc.) and short-term intervention focused approach. Pt indicated understanding. PRESENTING PROBLEM AND HISTORY  Barbara Ling is a 48 y.o. male who presents for new evaluation and treatment of alcohol use. He reports the following symptoms: history of 10-12 beers throughout the day, daily, for many years, noticed weight gain which concerned him so decided needed to cut back on use. Denies cravings or urges to use. No problems with work related to use. No legal issues. Does identify some impact on his marriage - states that wife expressed concern that he was drinking too much and that he would not go out to engage in daily activities outside of the house most days once he started drinking. States that he also noticed feeling much less edgy and depressed once he stopped use. States that he was concerned when he decided to stop use because he thought he might have withdrawal. No history of attempting to stop or cut back use before. Since setting up this initial appt states that he was required to stop use all together because of a shoulder surgery. This was a few weeks ago. States that he is suprised he had no symptoms following cessation of alcohol. States that he did drink about 10 beers again one day this past weekend but did not enjoy the experience, felt sick and did have a hangover the next day which he states was unusual for him. Feels he might just continue with not drinking all together.  Pt states he has lost 30lbs recently. States feels it is likely because he stopped drinking and has not had a big appetite since his surgery. Pt reports he is working to stay out of the house during the day (he can work from home) and to instead go into work. Feels this will help him to not return to old habits as drinking had really increased after he started working from home. CURRENT MEDICATIONS    Current Outpatient Medications:     ondansetron (ZOFRAN) 4 MG tablet, Take 1 tablet by mouth daily as needed for Nausea or Vomiting, Disp: 20 tablet, Rfl: 0    sertraline (ZOLOFT) 50 MG tablet, Take 1.5 tablets by mouth daily, Disp: 90 tablet, Rfl: 1    metFORMIN (GLUCOPHAGE) 500 MG tablet, Take 1 tablet by mouth 2 times daily (with meals), Disp: 180 tablet, Rfl: 1    atorvastatin (LIPITOR) 10 MG tablet, Take 1 tablet by mouth nightly, Disp: 90 tablet, Rfl: 1    aspirin EC 81 MG EC tablet, Take 1 tablet by mouth daily, Disp: 90 tablet, Rfl: 1    omeprazole (PRILOSEC) 20 MG delayed release capsule, Take 1 capsule by mouth Daily, Disp: 90 capsule, Rfl: 1    metoprolol tartrate (LOPRESSOR) 50 MG tablet, Take 1 tablet by mouth daily, Disp: 90 tablet, Rfl: 1    losartan (COZAAR) 100 MG tablet, Take 1 tablet by mouth daily, Disp: 90 tablet, Rfl: 1    hydrochlorothiazide (HYDRODIURIL) 25 MG tablet, Take 1 tablet by mouth daily, Disp: 90 tablet, Rfl: 1     FAMILY MEDICAL/MH HISTORY   His family history is not on file. No known mental health or drug/alcohol concerns in family history. Mariela Pedersen [address] reports a previous diagnosis of depression. Previous treatment includes medication only. He is currently taking zoloft and complains of the following medication side effects: none. PSYCHOSOCIAL HISTORY  Ngozi Culp is currently living with his wife of 5 years. Has adult children and has grandchild he enjoys spending time with.  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 Support system: wife  Hoahaoism/Spirituality: not identified    DRUG AND ALCOHOL CURRENT USE/HISTORY  TOBACCO:  He reports that he quit smoking about 7 years ago. His smoking use included cigarettes. He has a 70.00 pack-year smoking history. He has never used smokeless tobacco.  ALCOHOL:  He reports cut back use significantly since surgery a few weeks ago. OTHER SUBSTANCES: He reports no history of drug use. MENTAL STATUS EXAM  Mood was within normal limits with calm affect. Suicidal ideation was denied. Homicidal ideation was denied. Hygiene was good . Dress was neat. Behavior was Cooperative  Attitude was Cooperative. Eye-contact was good. Speech: rate - WNL, rhythm -  WNL, volume - WNL  Verbalizations were goal directed. Thought processes were intact and goal-oriented without evidence of delusions, hallucinations, obsessions, or antonio  Associations were characterized by intact cognitive processes. Pt was oriented to person, place, time, and general circumstances;  recent:  good and remote:  good. Insight and judgment were estimated to be good, AEB, a good  understanding of cyclical maladaptive patterns, and the ability to use insight to inform behavior change. PHQ Scores 6/6/2019   PHQ2 Score 1   PHQ9 Score 1     Interpretation of Total Score Depression Severity: 1-4 = Minimal depression, 5-9 = Mild depression, 10-14 = Moderate depression, 15-19 = Moderately severe depression, 20-27 = Severe depression    ASSESSMENT  Birt Lundborg presented to the appointment today for evaluation and treatment of symptoms of alcohol use. He is currently deemed no risk to himself or others and meets criteria for:  Alcohol Use Disorder, mild AEB tolerance, expresses concern that use was excessive and contributing to weight gain, continued use despite history of depression and impact on marital relationship.   Discussed risk of relapse in early stages and encouraged pt to identify and avoid triggers associated with drinking. Discussed benefits of meeting with [facility] for a period to help with identifying signs of relapse and for support. Pt declined. States that if he starts to experience cravings or a return to increasing use, he will follow up with another appt. States he feels he can maintain on his own. DIAGNOSIS  Jayne Rivas was seen today for new patient. Diagnoses and all orders for this visit:    Alcohol use disorder, mild, abuse        INTERVENTION  Provided Psychoeducation re: relapse prevention, early stages of sobriety - Discussed signs to monitor, honeymoon period, possible triggers, ways to help him maintain sobriety and dealing with urges   Motivational Interviewing to determine importance and readiness for change  Informed patient of the effects of alcohol  Completed initial mental health assessment  Clarified role of [facility] in primary care    PLAN  Pt will self-monitor for relapse and make follow up appt as needed  Pt will follow up with PCP for medication management of depression and let PCP know if depression worsens        INTERACTIVE COMPLEXITY  Is interactive complexity present?   No  Reason:  N/A  Additional Supporting Information:  N/A       Electronically signed by Devika Berger on 3/11/2020 at 9:52 AM

## 2020-03-06 ENCOUNTER — OFFICE VISIT (OUTPATIENT)
Dept: FAMILY MEDICINE CLINIC | Age: 55
End: 2020-03-06
Payer: COMMERCIAL

## 2020-03-06 VITALS
SYSTOLIC BLOOD PRESSURE: 110 MMHG | HEART RATE: 74 BPM | WEIGHT: 273 LBS | OXYGEN SATURATION: 95 % | BODY MASS INDEX: 38.09 KG/M2 | TEMPERATURE: 97.8 F | DIASTOLIC BLOOD PRESSURE: 72 MMHG | RESPIRATION RATE: 16 BRPM

## 2020-03-06 PROCEDURE — G8427 DOCREV CUR MEDS BY ELIG CLIN: HCPCS | Performed by: NURSE PRACTITIONER

## 2020-03-06 PROCEDURE — G8484 FLU IMMUNIZE NO ADMIN: HCPCS | Performed by: NURSE PRACTITIONER

## 2020-03-06 PROCEDURE — 3017F COLORECTAL CA SCREEN DOC REV: CPT | Performed by: NURSE PRACTITIONER

## 2020-03-06 PROCEDURE — 99213 OFFICE O/P EST LOW 20 MIN: CPT | Performed by: NURSE PRACTITIONER

## 2020-03-06 PROCEDURE — 1036F TOBACCO NON-USER: CPT | Performed by: NURSE PRACTITIONER

## 2020-03-06 PROCEDURE — G8417 CALC BMI ABV UP PARAM F/U: HCPCS | Performed by: NURSE PRACTITIONER

## 2020-03-06 ASSESSMENT — ENCOUNTER SYMPTOMS: SHORTNESS OF BREATH: 0

## 2020-03-06 NOTE — PROGRESS NOTES
Brittani Maharaj, ART-CNP  [address] [facility]  [id number]  [address] [facility]  Dept: [phone]  Dept Fax: [phone]    HPI:   /72   Pulse 74   Temp 97.8 °F (36.6 °C)   Resp 16   Wt 273 lb (123.8 kg)   SpO2 95%   BMI 38.09 kg/m²      Saranya Mane is a 48 y.o. male who presents today for his medical conditions/complaintsas noted below. Saranya Mane is c/o of Post-Op Check and Other (FU ETOH abuse)    HPI  1. Degenerative joint disease of right acromioclavicular joint  2. Complete tear of right rotator cuff, unspecified whether traumatic  3. Status post shoulder surgery  -SHOULDER ARTHROSCOPY ROTATOR CUFF REPAIR WITH ARTHREX (Right ) BICEPS TENDON REPAIR - ARTHROSCOPIC BICEPS TENODESIS (Right Arm Upper) completed 2-6-20 w/Dr. Jim Caceres  -Surgery went well, healing without complications- sutures out  -Has no pain currently  -He is not taking any medication for pain  -Cannot raise arm up  -Will start PT in 2 weeks  -Denies numbness/tingling, no unusual swelling    4.  Alcohol use disorder, mild, abuse  -He decreased drinking significantly, only drinking now on Saturdays  -He did follow up with Bernice Johnson for counseling  -Was given additional advice  -No withdrawal s/s- no tremors, no desire to drink  -Having 4-5 beers on Saturday  Wt Readings from Last 3 Encounters:   03/06/20 273 lb (123.8 kg)   02/17/20 276 lb 3.8 oz (125.3 kg)   02/06/20 276 lb 3.2 oz (125.3 kg)       Past Medical History:   Diagnosis Date    Arthritis     Diabetes mellitus ([address]     GERD (gastroesophageal reflux disease)     Hyperlipidemia     Hypertension     Positive colorectal cancer screening using Cologuard test 06/2019      Past Surgical History:   Procedure Laterality Date    BICEPS TENDON REPAIR Right 2/6/2020    BICEPS TENDON REPAIR - ARTHROSCOPIC BICEPS TENODESIS performed by Sofia Varma MD at [place] [address] N/A 7/30/2019 06/06/2020 (Originally 2/21/2016)    Flu vaccine (1) 12/11/2020 (Originally 9/1/2019)    Pneumococcal 0-64 years Vaccine (1 of 1 - PPSV23) 03/06/2021 (Originally 2/21/1972)    HIV screen  06/06/2029 (Originally 2/21/1981)    A1C test (Diabetic or Prediabetic)  01/17/2021    Lipid screen  01/17/2021    Potassium monitoring  01/17/2021    Creatinine monitoring  01/17/2021    Colon cancer screen colonoscopy  07/30/2029    Hepatitis A vaccine  Aged Out    Hepatitis B vaccine  Aged Out    Hib vaccine  Aged Out    Meningococcal (ACWY) vaccine  Aged Out     Subjective:   Review of Systems   Constitutional: Negative for chills and fever. Respiratory: Negative for shortness of breath. Cardiovascular: Negative for chest pain. Musculoskeletal: Positive for arthralgias (Right shoulder- improving). Neurological: Negative for numbness. Psychiatric/Behavioral: Negative. Objective:   /72   Pulse 74   Temp 97.8 °F (36.6 °C)   Resp 16   Wt 273 lb (123.8 kg)   SpO2 95%   BMI 38.09 kg/m²   Physical Exam  Vitals signs and nursing note reviewed. Constitutional:       Appearance: Normal appearance. He is well-developed. He is not ill-appearing or toxic-appearing. HENT:      Head: Normocephalic and atraumatic. Right Ear: External ear normal.      Left Ear: External ear normal.   Eyes:      Conjunctiva/sclera: Conjunctivae normal.   Neck:      Musculoskeletal: Normal range of motion. Cardiovascular:      Rate and Rhythm: Normal rate and regular rhythm. Pulses:           Radial pulses are 2+ on the right side. Heart sounds: Normal heart sounds. No murmur. No friction rub. No gallop. Pulmonary:      Effort: Pulmonary effort is normal. No accessory muscle usage or respiratory distress. Breath sounds: Normal breath sounds. No decreased breath sounds, wheezing, rhonchi or rales. Musculoskeletal: Normal range of motion.       Right hand: Normal. He exhibits normal capillary SHOCK WAVE LITHOTRIPSY- right side on 4-6-20    Patient was also evaluated in conjunction with Andrade Conner RN, APRN-Student. I have discussed the case, including pertinent history and exam findings with the APRN Student. I have seen and examined the patient with the student nurse practitioner, and all key elements of the encounter have been performed and documented by me. Return in about 3 months (around 6/6/2020) for routine follow up on chronic issues. Discussed use, benefit, and side effects of prescribed medications. Barriers to medication compliance addressed. All patient questions answered, patient voiced understanding. Barbara Fleeting.  Alejandro Hinojosa, APRN-CNP

## 2020-03-06 NOTE — PROGRESS NOTES
Visit Information    Have you changed or started any medications since your last visit including any over-the-counter medicines, vitamins, or herbal medicines? no   Have you stopped taking any of your medications? Is so, why? -  no  Are you having any side effects from any of your medications? - no    Have you seen any other physician or provider since your last visit?  no   Have you had any other diagnostic tests since your last visit?  no   Have you been seen in the emergency room and/or had an admission in a hospital since we last saw you?  no   Have you had your routine dental cleaning in the past 6 months?  yes - 02/2020     Do you have an active MyChart account? If no, what is the barrier?   Yes    Patient Care Team:  ART Torres CNP as PCP - General (Nurse Practitioner Family)  ART Torres CNP as PCP [facility]  Brenna Ramirez MD as Consulting Physician (Urology)    Medical History Review  Past Medical, Family, and Social History reviewed and does contribute to the patient presenting condition    Health Maintenance   Topic Date Due    DTaP/Tdap/Td vaccine (1 - Tdap) 06/06/2020 (Originally 2/21/1977)    Shingles Vaccine (1 of 2) 06/06/2020 (Originally 2/21/2016)    Flu vaccine (1) 12/11/2020 (Originally 9/1/2019)    Pneumococcal 0-64 years Vaccine (1 of 1 - PPSV23) 03/06/2021 (Originally 2/21/1972)    HIV screen  06/06/2029 (Originally 2/21/1981)    A1C test (Diabetic or Prediabetic)  01/17/2021    Lipid screen  01/17/2021    Potassium monitoring  01/17/2021    Creatinine monitoring  01/17/2021    Colon cancer screen colonoscopy  07/30/2029    Hepatitis A vaccine  Aged Out    Hepatitis B vaccine  Aged Out    Hib vaccine  Aged Out    Meningococcal (ACWY) vaccine  Aged Out

## 2020-03-16 ENCOUNTER — TELEPHONE (OUTPATIENT)
Dept: UROLOGY | Age: 55
End: 2020-03-16

## 2020-03-16 ENCOUNTER — OFFICE VISIT (OUTPATIENT)
Dept: ORTHOPEDIC SURGERY | Age: 55
End: 2020-03-16

## 2020-03-16 VITALS — WEIGHT: 272.93 LBS | HEIGHT: 71 IN | BODY MASS INDEX: 38.21 KG/M2

## 2020-03-16 PROCEDURE — 99024 POSTOP FOLLOW-UP VISIT: CPT | Performed by: ORTHOPAEDIC SURGERY

## 2020-03-16 NOTE — TELEPHONE ENCOUNTER
reschedule per COVID- 19 emergent cases only protocol   Spoke with patient  Procedure cancelled, will call back to schedule

## 2020-03-16 NOTE — PROGRESS NOTES
Procedure: Right shoulder arthroscopic rotator cuff repair and biceps tenodesis  Date of procedure: 2/6/20    HPI:  Mariama Pederson is a 48 y.o. male who is approximately 6 weeks status post aforementioned procedure. Still sore in his shoulder especially posteriorly where he feels that there is a knot at about the level of the shoulder blade. He has reportedly been compliant with his immobilization and pendulum exercises. Physical examination:  Evaluation of patient's right shoulder and upper extremity demonstrates his incisions to be healed. There is no warmth, erythema, wound dehiscence or drainage. Sensation is grossly intact light touch in all dermatomes and he has a 2+ radial pulse with brisk capillary refill in his fingers. Impression and plan:  Mariama Pederson is a 48 y.o. male who is approximately 6 weeks status post an arthroscopic right shoulder rotator cuff repair and biceps tenodesis. He is doing relatively well at this time. At this point he can wean out of his sling. I will have him start on a home program working on active assisted range of motion. He was provided and taught this today. He may use this arm for light activities of daily living limiting any lifting, pushing, or pulling to  1 to 2 pounds. I will see him back for reevaluation in 6 weeks but he may return or call earlier with any questions and/or concerns.

## 2020-04-27 ENCOUNTER — OFFICE VISIT (OUTPATIENT)
Dept: ORTHOPEDIC SURGERY | Age: 55
End: 2020-04-27

## 2020-04-27 VITALS — BODY MASS INDEX: 38.07 KG/M2 | HEIGHT: 71 IN

## 2020-04-27 PROCEDURE — 99024 POSTOP FOLLOW-UP VISIT: CPT | Performed by: ORTHOPAEDIC SURGERY

## 2020-04-27 NOTE — PROGRESS NOTES
Procedure: Right shoulder arthroscopic rotator cuff repair and biceps tenodesis  Date of procedure: 2/6/20    HPI:  Cali Bray is a 48 y.o. male who is approximately 3 months status post aforementioned procedure. He indicates that he still has recurrent pain in the shoulder. It bothers him taken off his jacket. He also has pain laying on his back. His pain starts in the shoulder blade and then radiates diffusely about the shoulder. He has remained compliant with his home stretching program.    Physical examination:  Evaluation of patient's right shoulder and upper extremity demonstrates his incisions to be healed. There is no warmth, erythema, wound dehiscence or drainage. Sensation is grossly intact light touch in all dermatomes and he has a 2+ radial pulse with brisk capillary refill in his fingers. He can actively elevate the right shoulder to 130 degrees, abduct 115 degrees and externally rotate to 65 degrees. Impression and plan:  Cali Bray is a 48 y.o. male who is approximately 3 months status post an arthroscopic right shoulder rotator cuff repair and biceps tenodesis. He is doing relatively well but does have persistent pain in the shoulder. I had a discussion with the patient today with regards to this. Based on where his he localizes his pain this may be scapular in nature but he may also be related to his rotator cuff. At this time again I have him start working on a home strengthening program and he was provided this today and also provided with resistance bands. He can increase use of this arm but still limit lifting, pushing, and pulling to 2 to 3 pounds. Would like to see him back for reevaluation in 3 months but he may certainly return or call earlier with any questions and/or concerns.

## 2020-06-25 ENCOUNTER — TELEPHONE (OUTPATIENT)
Dept: FAMILY MEDICINE CLINIC | Age: 55
End: 2020-06-25

## 2020-06-25 RX ORDER — ATORVASTATIN CALCIUM 10 MG/1
10 TABLET, FILM COATED ORAL NIGHTLY
Qty: 90 TABLET | Refills: 1 | OUTPATIENT
Start: 2020-06-25

## 2020-06-25 RX ORDER — ASPIRIN 81 MG/1
81 TABLET ORAL DAILY
Qty: 90 TABLET | Refills: 1 | OUTPATIENT
Start: 2020-06-25

## 2020-06-25 RX ORDER — LOSARTAN POTASSIUM 100 MG/1
100 TABLET ORAL DAILY
Qty: 90 TABLET | Refills: 1 | OUTPATIENT
Start: 2020-06-25

## 2020-06-25 RX ORDER — HYDROCHLOROTHIAZIDE 25 MG/1
25 TABLET ORAL DAILY
Qty: 90 TABLET | Refills: 1 | OUTPATIENT
Start: 2020-06-25

## 2020-06-25 RX ORDER — OMEPRAZOLE 20 MG/1
20 CAPSULE, DELAYED RELEASE ORAL DAILY
Qty: 90 CAPSULE | Refills: 1 | OUTPATIENT
Start: 2020-06-25

## 2020-06-25 RX ORDER — METOPROLOL TARTRATE 50 MG/1
50 TABLET, FILM COATED ORAL DAILY
Qty: 90 TABLET | Refills: 1 | OUTPATIENT
Start: 2020-06-25

## 2020-07-27 RX ORDER — PERMETHRIN 50 MG/G
CREAM TOPICAL
Qty: 1 TUBE | Refills: 1 | Status: SHIPPED | OUTPATIENT
Start: 2020-07-27 | End: 2020-08-17 | Stop reason: ALTCHOICE

## 2020-08-17 ENCOUNTER — APPOINTMENT (OUTPATIENT)
Dept: CT IMAGING | Age: 55
End: 2020-08-17
Payer: COMMERCIAL

## 2020-08-17 ENCOUNTER — HOSPITAL ENCOUNTER (EMERGENCY)
Age: 55
Discharge: HOME | End: 2020-08-17
Attending: EMERGENCY MEDICINE
Payer: COMMERCIAL

## 2020-08-17 VITALS
HEART RATE: 70 BPM | WEIGHT: 275 LBS | HEIGHT: 71 IN | DIASTOLIC BLOOD PRESSURE: 78 MMHG | TEMPERATURE: 98.8 F | RESPIRATION RATE: 18 BRPM | BODY MASS INDEX: 38.5 KG/M2 | SYSTOLIC BLOOD PRESSURE: 125 MMHG | OXYGEN SATURATION: 96 %

## 2020-08-17 LAB
-: ABNORMAL
ABSOLUTE EOS #: 0.76 K/UL (ref 0–0.4)
ABSOLUTE IMMATURE GRANULOCYTE: ABNORMAL K/UL (ref 0–0.3)
ABSOLUTE LYMPH #: 2.5 K/UL (ref 1–4.8)
ABSOLUTE MONO #: 1.06 K/UL (ref 0.1–1.2)
AMORPHOUS: ABNORMAL
ANION GAP SERPL CALCULATED.3IONS-SCNC: 12 MMOL/L (ref 9–17)
BACTERIA: ABNORMAL
BASOPHILS # BLD: 1 % (ref 0–2)
BASOPHILS ABSOLUTE: 0.12 K/UL (ref 0–0.2)
BILIRUBIN URINE: NEGATIVE
BUN BLDV-MCNC: 10 MG/DL (ref 6–20)
BUN/CREAT BLD: ABNORMAL (ref 9–20)
CALCIUM SERPL-MCNC: 9.4 MG/DL (ref 8.6–10.4)
CASTS UA: ABNORMAL /LPF
CHLORIDE BLD-SCNC: 98 MMOL/L (ref 98–107)
CO2: 25 MMOL/L (ref 20–31)
COLOR: YELLOW
COMMENT UA: ABNORMAL
CREAT SERPL-MCNC: 0.82 MG/DL (ref 0.7–1.2)
CRYSTALS, UA: ABNORMAL /HPF
DIFFERENTIAL TYPE: ABNORMAL
EOSINOPHILS RELATIVE PERCENT: 8 % (ref 1–4)
EPITHELIAL CELLS UA: ABNORMAL /HPF (ref 0–5)
GFR AFRICAN AMERICAN: >60 ML/MIN
GFR NON-AFRICAN AMERICAN: >60 ML/MIN
GFR SERPL CREATININE-BSD FRML MDRD: ABNORMAL ML/MIN/{1.73_M2}
GFR SERPL CREATININE-BSD FRML MDRD: ABNORMAL ML/MIN/{1.73_M2}
GLUCOSE BLD-MCNC: 144 MG/DL (ref 70–99)
GLUCOSE URINE: NEGATIVE
HCT VFR BLD CALC: 46.6 % (ref 41–53)
HEMOGLOBIN: 16.1 G/DL (ref 13.5–17.5)
IMMATURE GRANULOCYTES: ABNORMAL %
KETONES, URINE: NEGATIVE
LEUKOCYTE ESTERASE, URINE: NEGATIVE
LYMPHOCYTES # BLD: 27 % (ref 24–44)
MCH RBC QN AUTO: 33.3 PG (ref 26–34)
MCHC RBC AUTO-ENTMCNC: 34.5 G/DL (ref 31–37)
MCV RBC AUTO: 96.5 FL (ref 80–100)
MONOCYTES # BLD: 12 % (ref 2–11)
MUCUS: ABNORMAL
NITRITE, URINE: NEGATIVE
NRBC AUTOMATED: ABNORMAL PER 100 WBC
OTHER OBSERVATIONS UA: ABNORMAL
PDW BLD-RTO: 12.9 % (ref 12.5–15.4)
PH UA: 7.5 (ref 5–8)
PLATELET # BLD: 216 K/UL (ref 140–450)
PLATELET ESTIMATE: ABNORMAL
PMV BLD AUTO: 11.2 FL (ref 8–14)
POTASSIUM SERPL-SCNC: 4 MMOL/L (ref 3.7–5.3)
PROTEIN UA: NEGATIVE
RBC # BLD: 4.83 M/UL (ref 4.5–5.9)
RBC # BLD: ABNORMAL 10*6/UL
RBC UA: ABNORMAL /HPF (ref 0–2)
RENAL EPITHELIAL, UA: ABNORMAL /HPF
SEG NEUTROPHILS: 52 % (ref 36–66)
SEGMENTED NEUTROPHILS ABSOLUTE COUNT: 4.79 K/UL (ref 1.8–7.7)
SODIUM BLD-SCNC: 135 MMOL/L (ref 135–144)
SPECIFIC GRAVITY UA: 1.01 (ref 1–1.03)
TRICHOMONAS: ABNORMAL
TURBIDITY: CLEAR
URINE HGB: ABNORMAL
UROBILINOGEN, URINE: ABNORMAL
WBC # BLD: 9.2 K/UL (ref 3.5–11)
WBC # BLD: ABNORMAL 10*3/UL
WBC UA: ABNORMAL /HPF (ref 0–5)
YEAST: ABNORMAL

## 2020-08-17 PROCEDURE — 2580000003 HC RX 258: Performed by: EMERGENCY MEDICINE

## 2020-08-17 PROCEDURE — 99284 EMERGENCY DEPT VISIT MOD MDM: CPT

## 2020-08-17 PROCEDURE — 6360000002 HC RX W HCPCS: Performed by: EMERGENCY MEDICINE

## 2020-08-17 PROCEDURE — 85025 COMPLETE CBC W/AUTO DIFF WBC: CPT

## 2020-08-17 PROCEDURE — 74176 CT ABD & PELVIS W/O CONTRAST: CPT

## 2020-08-17 PROCEDURE — 96374 THER/PROPH/DIAG INJ IV PUSH: CPT

## 2020-08-17 PROCEDURE — 6370000000 HC RX 637 (ALT 250 FOR IP): Performed by: EMERGENCY MEDICINE

## 2020-08-17 PROCEDURE — 80048 BASIC METABOLIC PNL TOTAL CA: CPT

## 2020-08-17 PROCEDURE — 81001 URINALYSIS AUTO W/SCOPE: CPT

## 2020-08-17 PROCEDURE — 36415 COLL VENOUS BLD VENIPUNCTURE: CPT

## 2020-08-17 RX ORDER — MORPHINE SULFATE 4 MG/ML
4 INJECTION, SOLUTION INTRAMUSCULAR; INTRAVENOUS ONCE
Status: COMPLETED | OUTPATIENT
Start: 2020-08-17 | End: 2020-08-17

## 2020-08-17 RX ORDER — OXYCODONE HYDROCHLORIDE AND ACETAMINOPHEN 5; 325 MG/1; MG/1
1 TABLET ORAL EVERY 6 HOURS PRN
Qty: 12 TABLET | Refills: 0 | Status: SHIPPED | OUTPATIENT
Start: 2020-08-17 | End: 2020-08-20

## 2020-08-17 RX ORDER — 0.9 % SODIUM CHLORIDE 0.9 %
1000 INTRAVENOUS SOLUTION INTRAVENOUS ONCE
Status: COMPLETED | OUTPATIENT
Start: 2020-08-17 | End: 2020-08-17

## 2020-08-17 RX ORDER — TAMSULOSIN HYDROCHLORIDE 0.4 MG/1
0.4 CAPSULE ORAL DAILY
Qty: 5 CAPSULE | Refills: 0 | Status: SHIPPED | OUTPATIENT
Start: 2020-08-17 | End: 2020-08-21

## 2020-08-17 RX ORDER — CEPHALEXIN 500 MG/1
500 CAPSULE ORAL 4 TIMES DAILY
Qty: 28 CAPSULE | Refills: 0 | Status: SHIPPED | OUTPATIENT
Start: 2020-08-17 | End: 2020-08-21

## 2020-08-17 RX ORDER — TAMSULOSIN HYDROCHLORIDE 0.4 MG/1
0.4 CAPSULE ORAL ONCE
Status: COMPLETED | OUTPATIENT
Start: 2020-08-17 | End: 2020-08-17

## 2020-08-17 RX ADMIN — SODIUM CHLORIDE 1000 ML: 9 INJECTION, SOLUTION INTRAVENOUS at 09:54

## 2020-08-17 RX ADMIN — MORPHINE SULFATE 4 MG: 4 INJECTION INTRAVENOUS at 09:54

## 2020-08-17 RX ADMIN — TAMSULOSIN HYDROCHLORIDE 0.4 MG: 0.4 CAPSULE ORAL at 11:38

## 2020-08-17 ASSESSMENT — ENCOUNTER SYMPTOMS
PHOTOPHOBIA: 0
CHEST TIGHTNESS: 0
VOMITING: 0
DIARRHEA: 0
SORE THROAT: 0
RHINORRHEA: 0
BACK PAIN: 1
SINUS PRESSURE: 0
SHORTNESS OF BREATH: 0
NAUSEA: 0
CONSTIPATION: 0

## 2020-08-17 ASSESSMENT — PAIN DESCRIPTION - ORIENTATION
ORIENTATION: RIGHT;LEFT;POSTERIOR;LOWER
ORIENTATION: RIGHT;LEFT

## 2020-08-17 ASSESSMENT — PAIN DESCRIPTION - PAIN TYPE
TYPE: ACUTE PAIN
TYPE: ACUTE PAIN

## 2020-08-17 ASSESSMENT — PAIN DESCRIPTION - ONSET
ONSET: ON-GOING
ONSET: ON-GOING

## 2020-08-17 ASSESSMENT — PAIN SCALES - GENERAL
PAINLEVEL_OUTOF10: 3
PAINLEVEL_OUTOF10: 8

## 2020-08-17 ASSESSMENT — PAIN DESCRIPTION - LOCATION
LOCATION: BACK
LOCATION: BACK

## 2020-08-17 ASSESSMENT — PAIN DESCRIPTION - PROGRESSION
CLINICAL_PROGRESSION: GRADUALLY WORSENING
CLINICAL_PROGRESSION: GRADUALLY IMPROVING

## 2020-08-17 ASSESSMENT — PAIN DESCRIPTION - FREQUENCY
FREQUENCY: INTERMITTENT
FREQUENCY: INTERMITTENT

## 2020-08-17 ASSESSMENT — PAIN DESCRIPTION - DESCRIPTORS
DESCRIPTORS: ACHING
DESCRIPTORS: PRESSURE

## 2020-08-17 NOTE — ED PROVIDER NOTES
and shortness of breath. Cardiovascular: Negative for chest pain, palpitations and leg swelling. Gastrointestinal: Negative for constipation, diarrhea, nausea and vomiting. Genitourinary: Negative for dysuria, frequency, hematuria and urgency. Musculoskeletal: Positive for back pain. Negative for arthralgias. Skin: Negative for rash and wound. Neurological: Negative for dizziness and headaches. Hematological: Negative for adenopathy. Does not bruise/bleed easily. PAST MEDICAL HISTORY    has a past medical history of Arthritis, Diabetes mellitus ([address] GERD (gastroesophageal reflux disease), Hyperlipidemia, Hypertension, and Positive colorectal cancer screening using Cologuard test.    SURGICAL HISTORY      has a past surgical history that includes Hand surgery; [place] tooth extraction; Colonoscopy (N/A, 7/30/2019); Shoulder arthroscopy (Right, 02/06/2020); Shoulder arthroscopy (Right, 2/6/2020); and Biceps tendon repair (Right, 2/6/2020). CURRENTMEDICATIONS       Previous Medications    ASPIRIN EC 81 MG EC TABLET    Take 1 tablet by mouth daily    ATORVASTATIN (LIPITOR) 10 MG TABLET    Take 1 tablet by mouth nightly    HYDROCHLOROTHIAZIDE (HYDRODIURIL) 25 MG TABLET    Take 1 tablet by mouth daily    LOSARTAN (COZAAR) 100 MG TABLET    Take 1 tablet by mouth daily    METFORMIN (GLUCOPHAGE) 500 MG TABLET    Take 1 tablet by mouth 2 times daily (with meals)    METOPROLOL TARTRATE (LOPRESSOR) 50 MG TABLET    Take 1 tablet by mouth daily    OMEPRAZOLE (PRILOSEC) 20 MG DELAYED RELEASE CAPSULE    Take 1 capsule by mouth Daily    ONDANSETRON (ZOFRAN) 4 MG TABLET    Take 1 tablet by mouth daily as needed for Nausea or Vomiting    SERTRALINE (ZOLOFT) 50 MG TABLET    Take 1.5 tablets by mouth daily       ALLERGIES     has No Known Allergies. FAMILY HISTORY     has no family status information on file. family history is not on file.     SOCIAL HISTORY      reports that he quit smoking about 7 years ago. His smoking use included cigarettes. He has a 70.00 pack-year smoking history. He has never used smokeless tobacco. He reports current alcohol use of about 12.0 standard drinks of alcohol per week. He reports that he does not use drugs. PHYSICAL EXAM    (up to 7 for level 4, 8 or more for level 5)   INITIAL VITALS:  height is 5' 11\" (1.803 m) and weight is 124.7 kg (275 lb). His oral temperature is 98.8 °F (37.1 °C). His blood pressure is 125/78 and his pulse is 70. His respiration is 18 and oxygen saturation is 96%. Physical Exam  Vitals signs and nursing note reviewed. Constitutional:       General: He is not in acute distress. Appearance: Normal appearance. He is not ill-appearing, toxic-appearing or diaphoretic. HENT:      Head: Normocephalic and atraumatic. Eyes:      Extraocular Movements: Extraocular movements intact. Pupils: Pupils are equal, round, and reactive to light. Neck:      Musculoskeletal: Normal range of motion. Cardiovascular:      Rate and Rhythm: Normal rate and regular rhythm. Pulses: Normal pulses. Heart sounds: Normal heart sounds. No murmur. No friction rub. No gallop. Pulmonary:      Effort: Pulmonary effort is normal.      Breath sounds: Normal breath sounds. No wheezing, rhonchi or rales. Abdominal:      General: Abdomen is flat. Bowel sounds are normal.      Palpations: Abdomen is soft. Tenderness: There is no abdominal tenderness. There is right CVA tenderness and left CVA tenderness. There is no guarding or rebound. Musculoskeletal: Normal range of motion. Right lower leg: No edema. Left lower leg: No edema. Skin:     General: Skin is warm and dry. Capillary Refill: Capillary refill takes less than 2 seconds. Neurological:      Mental Status: He is alert and oriented to person, place, and time. Mental status is at baseline. Comments: Motor function is 5 out of 5 in the bilateral lower extremities.   Sensation adjacent to the gallbladder fossa. Pancreas and spleen are unremarkable. GI/Bowel: There is no evidence for bowel obstruction or inflammation. No free intraperitoneal air or fluid. Small bowel loops are normal in caliber. No evidence for hiatal hernia. Appendix is unremarkable. Pelvis: No evidence for free fluid. Peritoneum/Retroperitoneum: Adrenal glands are normal in size and configuration. There is a partially obstructing calculus within the right ureteropelvic junction measuring 9 mm. Multiple additional nonobstructing calculi bilaterally. Urinary bladder is unremarkable. Vasculature: The aorta is normal in caliber. No definite lymphadenopathy identified. Bones/Soft Tissues: No evidence for acute fracture or dislocation. No lytic or blastic lesions. Fat containing umbilical and periumbilical hernias. 9 mm partially obstructing calculus within the right ureteropelvic junction resulting in mild right hydronephrosis. Additional nonobstructive bilateral nephrolithiasis. Hepatic steatosis.        LABS:  Results for orders placed or performed during the hospital encounter of 08/17/20   Urinalysis with Microscopic   Result Value Ref Range    Color, UA YELLOW YELLOW    Turbidity UA CLEAR CLEAR    Glucose, Ur NEGATIVE NEGATIVE    Bilirubin Urine NEGATIVE NEGATIVE    Ketones, Urine NEGATIVE NEGATIVE    Specific Gravity, UA 1.015 1.005 - 1.030    Urine Hgb TRACE (A) NEGATIVE    pH, UA 7.5 5.0 - 8.0    Protein, UA NEGATIVE NEGATIVE    Urobilinogen, Urine ELEVATED (A) Normal    Nitrite, Urine NEGATIVE NEGATIVE    Leukocyte Esterase, Urine NEGATIVE NEGATIVE    Urinalysis Comments NOT REPORTED     -          WBC, UA 0 TO 2 0 - 5 /HPF    RBC, UA 10 TO 20 0 - 2 /HPF    Casts UA NOT REPORTED /LPF    Crystals, UA NOT REPORTED None /HPF    Epithelial Cells UA 2 TO 5 0 - 5 /HPF    Renal Epithelial, UA NOT REPORTED 0 /HPF    Bacteria, UA FEW (A) None    Mucus, UA 1+ (A) None    Trichomonas, UA NOT REPORTED None 18      RE-EVALUATION:  09:45 Patient updated on UA with hematuria noted and notified of plans for CT. Patient agreeable. 10:41 Patient's pain is improved after the morphine. Sitting in a chair, and appears comfortable. CT result pending. CONSULTS:  I attempted to contact Dr Gio Lara for Urology. No return call. Patient will call the office when they get home.      PROCEDURES:  None    FINAL IMPRESSION      1. Kidney stone          DISPOSITION/PLAN   DISPOSITION        CONDITION ON DISPOSITION:   Stable    PATIENT REFERRED TO:  Beck Jc MD  [address] [place] [address], [address]  [phone]            DISCHARGE MEDICATIONS:  New Prescriptions    No medications on file       (Please note that portions of this note were completed with a voicerecognition program.  Efforts were made to edit the dictations but occasionally words are mis-transcribed.)    Sharonda Bernal MD  Attending Emergency Medicine Physician        Sharonda Bernal MD  08/17/20 [id number]

## 2020-08-18 ENCOUNTER — APPOINTMENT (OUTPATIENT)
Dept: GENERAL RADIOLOGY | Age: 55
End: 2020-08-18
Attending: UROLOGY
Payer: COMMERCIAL

## 2020-08-18 ENCOUNTER — ANESTHESIA EVENT (OUTPATIENT)
Dept: OPERATING ROOM | Age: 55
End: 2020-08-18
Payer: COMMERCIAL

## 2020-08-18 ENCOUNTER — TELEPHONE (OUTPATIENT)
Dept: UROLOGY | Age: 55
End: 2020-08-18

## 2020-08-18 ENCOUNTER — ANESTHESIA (OUTPATIENT)
Dept: OPERATING ROOM | Age: 55
End: 2020-08-18
Payer: COMMERCIAL

## 2020-08-18 ENCOUNTER — HOSPITAL ENCOUNTER (OUTPATIENT)
Dept: GENERAL RADIOLOGY | Age: 55
Discharge: HOME | End: 2020-08-20
Attending: UROLOGY
Payer: COMMERCIAL

## 2020-08-18 ENCOUNTER — HOSPITAL ENCOUNTER (OUTPATIENT)
Age: 55
Setting detail: OUTPATIENT SURGERY
Discharge: HOME HEALTH | End: 2020-08-18
Attending: UROLOGY | Admitting: UROLOGY
Payer: COMMERCIAL

## 2020-08-18 VITALS
DIASTOLIC BLOOD PRESSURE: 88 MMHG | BODY MASS INDEX: 37.8 KG/M2 | WEIGHT: 270 LBS | RESPIRATION RATE: 16 BRPM | SYSTOLIC BLOOD PRESSURE: 153 MMHG | TEMPERATURE: 97.3 F | HEART RATE: 99 BPM | OXYGEN SATURATION: 98 % | HEIGHT: 71 IN

## 2020-08-18 VITALS
TEMPERATURE: 96.9 F | SYSTOLIC BLOOD PRESSURE: 120 MMHG | OXYGEN SATURATION: 97 % | RESPIRATION RATE: 2 BRPM | DIASTOLIC BLOOD PRESSURE: 80 MMHG

## 2020-08-18 LAB
GLUCOSE BLD-MCNC: 110 MG/DL (ref 75–110)
GLUCOSE BLD-MCNC: 112 MG/DL (ref 75–110)
SARS-COV-2, PCR: NORMAL
SARS-COV-2, RAPID: NOT DETECTED
SARS-COV-2: NORMAL
SOURCE: NORMAL

## 2020-08-18 PROCEDURE — 76000 FLUOROSCOPY <1 HR PHYS/QHP: CPT

## 2020-08-18 PROCEDURE — 7100000030 HC ASPR PHASE II RECOVERY - FIRST 15 MIN: Performed by: UROLOGY

## 2020-08-18 PROCEDURE — C2617 STENT, NON-COR, TEM W/O DEL: HCPCS | Performed by: UROLOGY

## 2020-08-18 PROCEDURE — 2580000003 HC RX 258: Performed by: ANESTHESIOLOGY

## 2020-08-18 PROCEDURE — 2500000003 HC RX 250 WO HCPCS: Performed by: NURSE ANESTHETIST, CERTIFIED REGISTERED

## 2020-08-18 PROCEDURE — 7100000001 HC PACU RECOVERY - ADDTL 15 MIN: Performed by: UROLOGY

## 2020-08-18 PROCEDURE — 3700000001 HC ADD 15 MINUTES (ANESTHESIA): Performed by: UROLOGY

## 2020-08-18 PROCEDURE — 7100000010 HC PHASE II RECOVERY - FIRST 15 MIN: Performed by: UROLOGY

## 2020-08-18 PROCEDURE — C1758 CATHETER, URETERAL: HCPCS | Performed by: UROLOGY

## 2020-08-18 PROCEDURE — 2709999900 HC NON-CHARGEABLE SUPPLY: Performed by: UROLOGY

## 2020-08-18 PROCEDURE — 7100000031 HC ASPR PHASE II RECOVERY - ADDTL 15 MIN: Performed by: UROLOGY

## 2020-08-18 PROCEDURE — 82947 ASSAY GLUCOSE BLOOD QUANT: CPT

## 2020-08-18 PROCEDURE — 3600000012 HC SURGERY LEVEL 2 ADDTL 15MIN: Performed by: UROLOGY

## 2020-08-18 PROCEDURE — 2720000010 HC SURG SUPPLY STERILE: Performed by: UROLOGY

## 2020-08-18 PROCEDURE — U0002 COVID-19 LAB TEST NON-CDC: HCPCS

## 2020-08-18 PROCEDURE — 3700000000 HC ANESTHESIA ATTENDED CARE: Performed by: UROLOGY

## 2020-08-18 PROCEDURE — 3600000002 HC SURGERY LEVEL 2 BASE: Performed by: UROLOGY

## 2020-08-18 PROCEDURE — C1769 GUIDE WIRE: HCPCS | Performed by: UROLOGY

## 2020-08-18 PROCEDURE — 6370000000 HC RX 637 (ALT 250 FOR IP): Performed by: UROLOGY

## 2020-08-18 PROCEDURE — 74018 RADEX ABDOMEN 1 VIEW: CPT

## 2020-08-18 PROCEDURE — 7100000000 HC PACU RECOVERY - FIRST 15 MIN: Performed by: UROLOGY

## 2020-08-18 PROCEDURE — 6360000002 HC RX W HCPCS: Performed by: NURSE ANESTHETIST, CERTIFIED REGISTERED

## 2020-08-18 PROCEDURE — 7100000011 HC PHASE II RECOVERY - ADDTL 15 MIN: Performed by: UROLOGY

## 2020-08-18 PROCEDURE — 6360000002 HC RX W HCPCS: Performed by: ANESTHESIOLOGY

## 2020-08-18 DEVICE — URETERAL STENT
Type: IMPLANTABLE DEVICE | Site: URETER | Status: FUNCTIONAL
Brand: POLARIS™ ULTRA

## 2020-08-18 RX ORDER — FENTANYL CITRATE 50 UG/ML
25 INJECTION, SOLUTION INTRAMUSCULAR; INTRAVENOUS EVERY 5 MIN PRN
Status: DISCONTINUED | OUTPATIENT
Start: 2020-08-18 | End: 2020-08-18 | Stop reason: HOSPADM

## 2020-08-18 RX ORDER — HYDRALAZINE HYDROCHLORIDE 20 MG/ML
5 INJECTION INTRAMUSCULAR; INTRAVENOUS EVERY 10 MIN PRN
Status: DISCONTINUED | OUTPATIENT
Start: 2020-08-18 | End: 2020-08-18 | Stop reason: HOSPADM

## 2020-08-18 RX ORDER — ONDANSETRON 2 MG/ML
4 INJECTION INTRAMUSCULAR; INTRAVENOUS
Status: DISCONTINUED | OUTPATIENT
Start: 2020-08-18 | End: 2020-08-18 | Stop reason: HOSPADM

## 2020-08-18 RX ORDER — FENTANYL CITRATE 50 UG/ML
INJECTION, SOLUTION INTRAMUSCULAR; INTRAVENOUS PRN
Status: DISCONTINUED | OUTPATIENT
Start: 2020-08-18 | End: 2020-08-18 | Stop reason: SDUPTHER

## 2020-08-18 RX ORDER — LABETALOL HYDROCHLORIDE 5 MG/ML
5 INJECTION, SOLUTION INTRAVENOUS EVERY 10 MIN PRN
Status: DISCONTINUED | OUTPATIENT
Start: 2020-08-18 | End: 2020-08-18 | Stop reason: HOSPADM

## 2020-08-18 RX ORDER — EPHEDRINE SULFATE/0.9% NACL/PF 50 MG/5 ML
SYRINGE (ML) INTRAVENOUS PRN
Status: DISCONTINUED | OUTPATIENT
Start: 2020-08-18 | End: 2020-08-18 | Stop reason: SDUPTHER

## 2020-08-18 RX ORDER — PROPOFOL 10 MG/ML
INJECTION, EMULSION INTRAVENOUS PRN
Status: DISCONTINUED | OUTPATIENT
Start: 2020-08-18 | End: 2020-08-18 | Stop reason: SDUPTHER

## 2020-08-18 RX ORDER — ONDANSETRON 2 MG/ML
INJECTION INTRAMUSCULAR; INTRAVENOUS PRN
Status: DISCONTINUED | OUTPATIENT
Start: 2020-08-18 | End: 2020-08-18 | Stop reason: SDUPTHER

## 2020-08-18 RX ORDER — SODIUM CHLORIDE 9 MG/ML
INJECTION, SOLUTION INTRAVENOUS CONTINUOUS
Status: DISCONTINUED | OUTPATIENT
Start: 2020-08-18 | End: 2020-08-18 | Stop reason: HOSPADM

## 2020-08-18 RX ORDER — HYDROCODONE BITARTRATE AND ACETAMINOPHEN 5; 325 MG/1; MG/1
1 TABLET ORAL PRN
Status: DISCONTINUED | OUTPATIENT
Start: 2020-08-18 | End: 2020-08-18 | Stop reason: HOSPADM

## 2020-08-18 RX ORDER — MORPHINE SULFATE 2 MG/ML
2 INJECTION, SOLUTION INTRAMUSCULAR; INTRAVENOUS EVERY 5 MIN PRN
Status: DISCONTINUED | OUTPATIENT
Start: 2020-08-18 | End: 2020-08-18 | Stop reason: HOSPADM

## 2020-08-18 RX ORDER — METOCLOPRAMIDE HYDROCHLORIDE 5 MG/ML
10 INJECTION INTRAMUSCULAR; INTRAVENOUS
Status: DISCONTINUED | OUTPATIENT
Start: 2020-08-18 | End: 2020-08-18 | Stop reason: HOSPADM

## 2020-08-18 RX ORDER — LIDOCAINE HYDROCHLORIDE 10 MG/ML
INJECTION, SOLUTION EPIDURAL; INFILTRATION; INTRACAUDAL; PERINEURAL PRN
Status: DISCONTINUED | OUTPATIENT
Start: 2020-08-18 | End: 2020-08-18 | Stop reason: SDUPTHER

## 2020-08-18 RX ORDER — MEPERIDINE HYDROCHLORIDE 25 MG/ML
12.5 INJECTION INTRAMUSCULAR; INTRAVENOUS; SUBCUTANEOUS EVERY 5 MIN PRN
Status: DISCONTINUED | OUTPATIENT
Start: 2020-08-18 | End: 2020-08-18 | Stop reason: HOSPADM

## 2020-08-18 RX ORDER — HYDROCODONE BITARTRATE AND ACETAMINOPHEN 5; 325 MG/1; MG/1
2 TABLET ORAL PRN
Status: DISCONTINUED | OUTPATIENT
Start: 2020-08-18 | End: 2020-08-18 | Stop reason: HOSPADM

## 2020-08-18 RX ORDER — DEXAMETHASONE SODIUM PHOSPHATE 4 MG/ML
INJECTION, SOLUTION INTRA-ARTICULAR; INTRALESIONAL; INTRAMUSCULAR; INTRAVENOUS; SOFT TISSUE PRN
Status: DISCONTINUED | OUTPATIENT
Start: 2020-08-18 | End: 2020-08-18 | Stop reason: SDUPTHER

## 2020-08-18 RX ORDER — CEFAZOLIN SODIUM 1 G/3ML
INJECTION, POWDER, FOR SOLUTION INTRAMUSCULAR; INTRAVENOUS PRN
Status: DISCONTINUED | OUTPATIENT
Start: 2020-08-18 | End: 2020-08-18 | Stop reason: SDUPTHER

## 2020-08-18 RX ORDER — LIDOCAINE HYDROCHLORIDE 20 MG/ML
JELLY TOPICAL PRN
Status: DISCONTINUED | OUTPATIENT
Start: 2020-08-18 | End: 2020-08-18 | Stop reason: ALTCHOICE

## 2020-08-18 RX ORDER — FENTANYL CITRATE 50 UG/ML
50 INJECTION, SOLUTION INTRAMUSCULAR; INTRAVENOUS EVERY 5 MIN PRN
Status: DISCONTINUED | OUTPATIENT
Start: 2020-08-18 | End: 2020-08-18 | Stop reason: HOSPADM

## 2020-08-18 RX ORDER — DIPHENHYDRAMINE HYDROCHLORIDE 50 MG/ML
12.5 INJECTION INTRAMUSCULAR; INTRAVENOUS
Status: DISCONTINUED | OUTPATIENT
Start: 2020-08-18 | End: 2020-08-18 | Stop reason: HOSPADM

## 2020-08-18 RX ADMIN — CEFAZOLIN 3000 MG: 1 INJECTION, POWDER, FOR SOLUTION INTRAMUSCULAR; INTRAVENOUS at 15:28

## 2020-08-18 RX ADMIN — FENTANYL CITRATE 25 MCG: 50 INJECTION, SOLUTION INTRAMUSCULAR; INTRAVENOUS at 15:25

## 2020-08-18 RX ADMIN — Medication 10 MG: at 15:28

## 2020-08-18 RX ADMIN — Medication 20 MG: at 15:32

## 2020-08-18 RX ADMIN — SODIUM CHLORIDE: 9 INJECTION, SOLUTION INTRAVENOUS at 14:42

## 2020-08-18 RX ADMIN — ONDANSETRON 4 MG: 2 INJECTION INTRAMUSCULAR; INTRAVENOUS at 15:21

## 2020-08-18 RX ADMIN — LIDOCAINE HYDROCHLORIDE 50 MG: 10 INJECTION, SOLUTION EPIDURAL; INFILTRATION; INTRACAUDAL; PERINEURAL at 15:17

## 2020-08-18 RX ADMIN — Medication 20 MG: at 15:38

## 2020-08-18 RX ADMIN — FENTANYL CITRATE 50 MCG: 50 INJECTION, SOLUTION INTRAMUSCULAR; INTRAVENOUS at 15:41

## 2020-08-18 RX ADMIN — FENTANYL CITRATE 25 MCG: 50 INJECTION, SOLUTION INTRAMUSCULAR; INTRAVENOUS at 15:44

## 2020-08-18 RX ADMIN — PROPOFOL 300 MG: 10 INJECTION, EMULSION INTRAVENOUS at 15:17

## 2020-08-18 RX ADMIN — DEXAMETHASONE SODIUM PHOSPHATE 4 MG: 4 INJECTION, SOLUTION INTRA-ARTICULAR; INTRALESIONAL; INTRAMUSCULAR; INTRAVENOUS; SOFT TISSUE at 15:21

## 2020-08-18 RX ADMIN — HYDROMORPHONE HYDROCHLORIDE 0.5 MG: 1 INJECTION, SOLUTION INTRAMUSCULAR; INTRAVENOUS; SUBCUTANEOUS at 16:10

## 2020-08-18 ASSESSMENT — PULMONARY FUNCTION TESTS
PIF_VALUE: 6
PIF_VALUE: 3
PIF_VALUE: 4
PIF_VALUE: 3
PIF_VALUE: 7
PIF_VALUE: 4
PIF_VALUE: 4
PIF_VALUE: 3
PIF_VALUE: 4
PIF_VALUE: 6
PIF_VALUE: 5
PIF_VALUE: 0
PIF_VALUE: 7
PIF_VALUE: 4
PIF_VALUE: 22
PIF_VALUE: 1
PIF_VALUE: 4
PIF_VALUE: 4
PIF_VALUE: 3
PIF_VALUE: 6
PIF_VALUE: 1
PIF_VALUE: 7
PIF_VALUE: 2
PIF_VALUE: 6
PIF_VALUE: 4
PIF_VALUE: 6
PIF_VALUE: 4
PIF_VALUE: 6
PIF_VALUE: 4
PIF_VALUE: 4
PIF_VALUE: 6
PIF_VALUE: 3
PIF_VALUE: 3
PIF_VALUE: 6
PIF_VALUE: 6
PIF_VALUE: 4
PIF_VALUE: 7
PIF_VALUE: 6
PIF_VALUE: 3
PIF_VALUE: 6
PIF_VALUE: 6
PIF_VALUE: 11
PIF_VALUE: 13
PIF_VALUE: 4
PIF_VALUE: 6

## 2020-08-18 ASSESSMENT — PAIN DESCRIPTION - DESCRIPTORS
DESCRIPTORS: SORE
DESCRIPTORS: SORE
DESCRIPTORS: ACHING;DISCOMFORT

## 2020-08-18 ASSESSMENT — PAIN DESCRIPTION - PAIN TYPE
TYPE: SURGICAL PAIN
TYPE: SURGICAL PAIN

## 2020-08-18 ASSESSMENT — PAIN SCALES - GENERAL
PAINLEVEL_OUTOF10: 5
PAINLEVEL_OUTOF10: 7
PAINLEVEL_OUTOF10: 7

## 2020-08-18 ASSESSMENT — PAIN DESCRIPTION - LOCATION
LOCATION: PELVIS
LOCATION: PELVIS

## 2020-08-18 ASSESSMENT — ENCOUNTER SYMPTOMS: STRIDOR: 0

## 2020-08-18 ASSESSMENT — PAIN - FUNCTIONAL ASSESSMENT: PAIN_FUNCTIONAL_ASSESSMENT: 0-10

## 2020-08-18 NOTE — TELEPHONE ENCOUNTER
Cysto, (RT) URS, HLL, (RT) stent placement @ [facility] 8/18/20 3:30pm   COVID-19 @ [facility] rapid oked by Matt in [address] with patient, NPO ASAP. Patient was given procedure info verbally and understood all instruction.

## 2020-08-18 NOTE — ANESTHESIA PRE PROCEDURE
Department of Anesthesiology  Preprocedure Note       Name:  Yani Unger   Age:  48 y.o.  :  1965                                          MRN:  [id number]         Date:  2020      Surgeon: Heidi Fraire):  Yogesh Weinstein MD    Procedure: Procedure(s):  CYSTO URETEROSCOPY HOLMIUM LASER W/STENT INSERTION    Medications prior to admission:   Prior to Admission medications    Medication Sig Start Date End Date Taking? Authorizing Provider   oxyCODONE-acetaminophen (PERCOCET) 5-325 MG per tablet Take 1 tablet by mouth every 6 hours as needed for Pain for up to 3 days. Intended supply: 3 days.  Take lowest dose possible to manage pain 20 Yes Donn Rodas MD   tamsulosin [facility]) 0.4 MG capsule Take 1 capsule by mouth daily for 5 doses 20 Yes Donn Rodas MD   cephALEXin [facility]) 500 MG capsule Take 1 capsule by mouth 4 times daily for 7 days 20 Yes Donn Rodas MD   atorvastatin (LIPITOR) 10 MG tablet Take 1 tablet by mouth nightly 20  Yes ART Manning CNP   aspirin EC 81 MG EC tablet Take 1 tablet by mouth daily 20  Yes ART Manning CNP   omeprazole (PRILOSEC) 20 MG delayed release capsule Take 1 capsule by mouth Daily 20  Yes ART Manning CNP   metoprolol tartrate (LOPRESSOR) 50 MG tablet Take 1 tablet by mouth daily 20  Yes ART Manning CNP   losartan (COZAAR) 100 MG tablet Take 1 tablet by mouth daily 20  Yes ART Manning CNP   hydroCHLOROthiazide (HYDRODIURIL) 25 MG tablet Take 1 tablet by mouth daily 20  Yes ART Manning CNP   sertraline (ZOLOFT) 50 MG tablet Take 1.5 tablets by mouth daily 20  Yes ART Manning CNP   metFORMIN (GLUCOPHAGE) 500 MG tablet Take 1 tablet by mouth 2 times daily (with meals) 20  Yes ART Manning CNP   ondansetron (ZOFRAN) 4 MG tablet Take 1 tablet by mouth daily as needed for Nausea or Vomiting 20  Yes Annie Gosselin Yumiko Hagen MD       Current medications:    Current Facility-Administered Medications   Medication Dose Route Frequency Provider Last Rate Last Dose    0.9 % sodium chloride infusion   Intravenous Continuous Oswald Isaacs  mL/hr at 08/18/20 1442         Allergies:  No Known Allergies    Problem List:    Patient Active Problem List   Diagnosis Code    Essential hypertension I10    Acute pain of right shoulder M25.511    Gastric reflux K21.9    Class 2 severe obesity due to excess calories with serious comorbidity and body mass index (BMI) of 38.0 to 38.9 in adult ([facility] [address] E66.01, Z68.38    Anxiety and depression F41.9, F32.9    Positive colorectal cancer screening using Cologuard test R19.5    Elevated ALT measurement R74.0    Prediabetes R73.03    Elevated hemoglobin (HCC) D58.2    Pure hypercholesterolemia L14.49    Umbilical hernia D70.3    Ventral hernia K43.9    Kidney stones N20.0    Right inguinal hernia K40.90    Trigger finger, right middle finger M65.331    Degenerative joint disease of right acromioclavicular joint M19.011    Tear of right supraspinatus tendon M75.101    Infraspinatus tendinitis, right M75.81    Complete tear of right rotator cuff M75.121    Right bicipital tenosynovitis M75.21    Abnormal prostate exam- calcifications shown on CT ABD/pelvis 7-10-19 R39.89    Alcohol use disorder, mild, abuse F10.10       Past Medical History:        Diagnosis Date    Arthritis     Diabetes mellitus ([facility]     GERD (gastroesophageal reflux disease)     Hyperlipidemia     Hypertension     Positive colorectal cancer screening using Cologuard test 06/2019       Past Surgical History:        Procedure Laterality Date    BICEPS TENDON REPAIR Right 2/6/2020    BICEPS TENDON REPAIR - ARTHROSCOPIC BICEPS TENODESIS performed by [place] MD at [address] [facility] COLONOSCOPY N/A 7/30/2019    COLORECTAL CANCER SCREENING, NOT HIGH RISK performed by Ale Sepulveda MD at [facility] 8/18/2020

## 2020-08-18 NOTE — H&P
HISTORY and [facility] [address]       NAME:  Tyree Ward  MRN: [id number]   YOB: 1965   Date: 8/18/2020   Age: 48 y.o. Gender: male       COMPLAINT AND PRESENT HISTORY:     Tyree Ward is 48 y.o.,  male, here for hydronephrosis with scheduled cysto ureteroscopy holmium laser with stent insertion right. Pt presented to ED in [place] with c/o bilateral flank pain that began about one week ago. Pt reports a feeling of pressure to right flank that is intermittent. Rating pain 7/10. Pain is worse when he is driving. Taking percocet with moderate relief. CT of abdomen and pelvis completed on 08/17/2020-results below. Denies dysuria, frequency or urgency. Pt denies any nikita blood in urine. Denies nausea or vomiting. NPO. Took am medications including 81 mg aspirin. Denies current chest pain/pressure, palpitations, SOB, recent URI, fever or chills. Narrative    EXAMINATION:    CT OF THE ABDOMEN AND PELVIS WITHOUT CONTRAST 8/17/2020 9:46 am         TECHNIQUE:    CT of the abdomen and pelvis was performed without the administration of    intravenous contrast. Multiplanar reformatted images are provided for review. Dose modulation, iterative reconstruction, and/or weight based adjustment of    the mA/kV was utilized to reduce the radiation dose to as low as reasonably    achievable.         COMPARISON:    07/10/2019         HISTORY:    ORDERING SYSTEM PROVIDED HISTORY: kidney stones    TECHNOLOGIST PROVIDED HISTORY:    kidney stones    Reason for Exam: flank pain today.  hx of kidney stones    Acuity: Acute    Type of Exam: Initial         FINDINGS:    Lower Chest: Visualized lung bases are clear without focal airspace    consolidation, sizeable effusion, or pneumothorax.  Linear    scarring/atelectasis within the lingula.  No definite pulmonary nodules or    masses.  Base of the heart is normal in size without pericardial fluid    collection.      Organs: Hepatic steatosis.  No focal hepatic mass lesions.  Focal fatty    sparing adjacent to the gallbladder fossa.  Pancreas and spleen are    unremarkable.         GI/Bowel: There is no evidence for bowel obstruction or inflammation.  No    free intraperitoneal air or fluid.  Small bowel loops are normal in caliber.     No evidence for hiatal hernia.  Appendix is unremarkable.         Pelvis: No evidence for free fluid.         Peritoneum/Retroperitoneum: Adrenal glands are normal in size and    configuration.  There is a partially obstructing calculus within the right    ureteropelvic junction measuring 9 mm.  Multiple additional nonobstructing    calculi bilaterally.  Urinary bladder is unremarkable.         Vasculature:  The aorta is normal in caliber.  No definite lymphadenopathy    identified.         Bones/Soft Tissues: No evidence for acute fracture or dislocation.  No lytic    or blastic lesions.  Fat containing umbilical and periumbilical hernias.              Impression    9 mm partially obstructing calculus within the right ureteropelvic junction    resulting in mild right hydronephrosis.         Additional nonobstructive bilateral nephrolithiasis.         Hepatic steatosis.             PAST MEDICAL HISTORY     Past Medical History:   Diagnosis Date    Arthritis     Diabetes mellitus (HCC)     GERD (gastroesophageal reflux disease)     Hyperlipidemia     Hypertension     Positive colorectal cancer screening using Cologuard test 06/2019       SURGICAL HISTORY       Past Surgical History:   Procedure Laterality Date    BICEPS TENDON REPAIR Right 2/6/2020    BICEPS TENDON REPAIR - ARTHROSCOPIC BICEPS TENODESIS performed by Tae Alvares MD at [facility] N/A 7/30/2019    COLORECTAL CANCER SCREENING, NOT HIGH RISK performed by Melissa Choe MD at [facility] ARTHROSCOPY Right 02/06/2020    ROTATOR CUFF REPAIR WITH ARTHREX, Biceps tendon repair    SHOULDER ARTHROSCOPY Right 2020    SHOULDER ARTHROSCOPY ROTATOR CUFF REPAIR WITH ARTHREX performed by Tae Alvares MD at [address] [facility]     History reviewed. No pertinent family history. SOCIAL HISTORY       Social History     Socioeconomic History    Marital status:      Spouse name: None    Number of children: None    Years of education: None    Highest education level: None   Occupational History    None   Social Needs    Financial resource strain: None    Food insecurity     Worry: None     Inability: None    Transportation needs     Medical: None     Non-medical: None   Tobacco Use    Smoking status: Former Smoker     Packs/day: 2.00     Years: 35.00     Pack years: 70.00     Types: Cigarettes     Last attempt to quit:      Years since quittin.6    Smokeless tobacco: Never Used   Substance and Sexual Activity    Alcohol use: Yes     Alcohol/week: 12.0 standard drinks     Types: 12 Cans of beer per week     Comment: Every other day    Drug use: Never    Sexual activity: Yes   Lifestyle    Physical activity     Days per week: None     Minutes per session: None    Stress: None   Relationships    Social connections     Talks on phone: None     Gets together: None     Attends Restorationism service: None     Active member of club or organization: None     Attends meetings of clubs or organizations: None     Relationship status: None    Intimate partner violence     Fear of current or ex partner: None     Emotionally abused: None     Physically abused: None     Forced sexual activity: None   Other Topics Concern    None   Social History Narrative    None           REVIEW OF SYSTEMS      No Known Allergies    No current facility-administered medications on file prior to encounter.       Current Outpatient Medications on File Prior to Encounter   Medication Sig Dispense Refill    oxyCODONE-acetaminophen (PERCOCET) 5-325 MG per tablet Take 1 tablet by mouth every 6 hours as needed for Pain for up to 3 days. Intended supply: 3 days. Take lowest dose possible to manage pain 12 tablet 0    tamsulosin (FLOMAX) 0.4 MG capsule Take 1 capsule by mouth daily for 5 doses 5 capsule 0    cephALEXin (KEFLEX) 500 MG capsule Take 1 capsule by mouth 4 times daily for 7 days 28 capsule 0    atorvastatin (LIPITOR) 10 MG tablet Take 1 tablet by mouth nightly 90 tablet 1    aspirin EC 81 MG EC tablet Take 1 tablet by mouth daily 90 tablet 1    omeprazole (PRILOSEC) 20 MG delayed release capsule Take 1 capsule by mouth Daily 90 capsule 1    metoprolol tartrate (LOPRESSOR) 50 MG tablet Take 1 tablet by mouth daily 90 tablet 1    losartan (COZAAR) 100 MG tablet Take 1 tablet by mouth daily 90 tablet 1    hydroCHLOROthiazide (HYDRODIURIL) 25 MG tablet Take 1 tablet by mouth daily 90 tablet 1    sertraline (ZOLOFT) 50 MG tablet Take 1.5 tablets by mouth daily 135 tablet 1    metFORMIN (GLUCOPHAGE) 500 MG tablet Take 1 tablet by mouth 2 times daily (with meals) 180 tablet 1    ondansetron (ZOFRAN) 4 MG tablet Take 1 tablet by mouth daily as needed for Nausea or Vomiting 20 tablet 0       Negative except for what is mentioned in the HPI. GENERAL PHYSICAL EXAM     Vitals: BP (!) 145/83   Pulse 86   Temp 97.5 °F (36.4 °C) (Infrared)   Resp 16   Ht 5' 11\" (1.803 m)   Wt 270 lb (122.5 kg)   SpO2 98%   BMI 37.66 kg/m²  Body mass index is 37.66 kg/m². GENERAL APPEARANCE:   Doe Toney is 48 y.o.,  male, moderately obese, nourished, conscious, alert. Does not appear to be distress or pain at this time. SKIN:  Warm, dry, no cyanosis or jaundice. HEAD:  Normocephalic, atraumatic. EYES:  Pupils equal, reactive to light. EARS:  No discharge, no marked hearing loss. NOSE:  No rhinorrhea, epistaxis or septal deformity. THROAT:  Not congested. No ulceration bleeding or discharge. NECK:  No stiffness, trachea central.  No palpable masses or L.N.                 CHEST:  Symmetrical and equal on expansion. HEART:  RRR S1 > S2. No audible murmurs or gallops. LUNGS:  Equal on expansion, normal breath sounds. No wheezing, rhonchi or rales. ABDOMEN:  Obese. NABS x4 quads. Soft on palpation. No localized tenderness. No guarding or rigidity. LYMPHATICS:  No palpable cervical lymphadenopathy. LOCOMOTOR, BACK AND SPINE:  No tenderness or deformities. EXTREMITIES:  Symmetrical, no pretibial edema. No calf tenderness. No discoloration or ulcerations. NEUROLOGIC:  The patient is conscious, alert, oriented. No facial drooping. Speech clear. No apparent focal sensory or motor deficits.              PROVISIONAL DIAGNOSES / SURGERY:      HYDRONEPHROSIS     CYSTO URETEROSCOPY HOLMIUM LASER W/STENT INSERTION  Right    Patient Active Problem List    Diagnosis Date Noted    Alcohol use disorder, mild, abuse 01/29/2020    Abnormal prostate exam- calcifications shown on CT ABD/pelvis 7-10-19 12/11/2019    Complete tear of right rotator cuff 12/03/2019    Right bicipital tenosynovitis 12/03/2019    Tear of right supraspinatus tendon 11/07/2019    Infraspinatus tendinitis, right 11/07/2019    Degenerative joint disease of right acromioclavicular joint 10/31/2019    Trigger finger, right middle finger 67/59/6860    Umbilical hernia 21/47/4236    Ventral hernia 07/10/2019    Kidney stones 07/10/2019    Right inguinal hernia 07/10/2019    Elevated ALT measurement 06/28/2019    Prediabetes 06/28/2019    Elevated hemoglobin ([facility] [address] 06/28/2019    Pure hypercholesterolemia 06/28/2019    Positive colorectal cancer screening using Cologuard test 06/19/2019    Essential hypertension 06/06/2019    Acute pain of right shoulder 06/06/2019    Gastric reflux 06/06/2019    Class 2 severe obesity due to excess calories with serious comorbidity and body mass index (BMI) of 38.0 to 38.9 in adult [facility]) 06/06/2019    Anxiety and depression 06/06/2019           Maryelizabeth Dance, APRN - CNP on 8/18/2020 at 2:28 PM

## 2020-08-18 NOTE — ANESTHESIA POSTPROCEDURE EVALUATION
POST- ANESTHESIA EVALUATION       Pt Name: Julius Wang  MRN: [id number]  YOB: 1965  Date of evaluation: 8/18/2020  Time:  4:27 PM      BP (!) 154/71   Pulse 100   Temp 97.4 °F (36.3 °C) (Infrared)   Resp 15   Ht 5' 11\" (1.803 m)   Wt 270 lb (122.5 kg)   SpO2 95%   BMI 37.66 kg/m²      Consciousness Level  Awake  Cardiopulmonary Status  Stable  Pain Adequately Treated YES  Nausea / Vomiting  NO  Adequate Hydration  YES  Anesthesia Related Complications NONE      Electronically signed by Carolina Prakash MD on 8/18/2020 at 4:27 PM       Department of Anesthesiology  Postprocedure Note    Patient: Julius Wang  MRN: [id number]  YOB: 1965  Date of evaluation: 8/18/2020  Time:  4:27 PM     Procedure Summary     Date:  08/18/20 Room / Location:  [address] [place] [address]: [facility]    Anesthesia Start:  6963 Anesthesia Stop:  1604    Procedure:  CYSTO URETEROSCOPY HOLMIUM LASER W/STENT INSERTION (Right ) Diagnosis:  (HYDRONEPHROSIS)    Surgeon:  Mega Braga MD Responsible Provider:  Yinka Alatorre MD    Anesthesia Type:  general ASA Status:  3          Anesthesia Type: general    Shalini Phase I: Shalini Score: 9    Shalini Phase II:      Last vitals: Reviewed and per EMR flowsheets.        Anesthesia Post Evaluation

## 2020-08-18 NOTE — BRIEF OP NOTE
Brief Postoperative Note      Patient: Dionne Maria  YOB: 1965  MRN: [id number]    Date of Procedure: 8/18/2020    Pre-Op Diagnosis: HYDRONEPHROSIS    Post-Op Diagnosis: Same       Procedure(s):  CYSTO URETEROSCOPY HOLMIUM LASER W/STENT INSERTION    Surgeon(s):  Everton Rordiguez MD    Assistant:  * No surgical staff found *    Anesthesia: General    Estimated Blood Loss (mL): Minimal    Complications: None    Specimens:   * No specimens in log *    Implants:  Implant Name Type Inv. Item Serial No.  Lot No. LRB No. Used Action   STENT URET 2 POLARIS W/O GW [id number] Stent:Urological STENT URET 2 POLARIS W/O GW [id number]  [facility] [employer]: [address] [place] [address] [id number] Right 1 Implanted         Drains: [id number] stent    Findings:  Right ureteral stone fragmented. Stent placed.      Electronically signed by Everton Rodriguez MD on 8/18/2020 at 3:56 PM

## 2020-08-21 ENCOUNTER — TELEPHONE (OUTPATIENT)
Dept: UROLOGY | Age: 55
End: 2020-08-21

## 2020-08-21 ENCOUNTER — HOSPITAL ENCOUNTER (EMERGENCY)
Age: 55
Discharge: HOME | End: 2020-08-21
Attending: EMERGENCY MEDICINE
Payer: COMMERCIAL

## 2020-08-21 ENCOUNTER — APPOINTMENT (OUTPATIENT)
Dept: CT IMAGING | Age: 55
End: 2020-08-21
Payer: COMMERCIAL

## 2020-08-21 VITALS
DIASTOLIC BLOOD PRESSURE: 105 MMHG | SYSTOLIC BLOOD PRESSURE: 165 MMHG | OXYGEN SATURATION: 96 % | RESPIRATION RATE: 18 BRPM | TEMPERATURE: 97.9 F | HEART RATE: 72 BPM

## 2020-08-21 LAB
-: ABNORMAL
ABSOLUTE EOS #: 0.3 K/UL (ref 0–0.4)
ABSOLUTE IMMATURE GRANULOCYTE: ABNORMAL K/UL (ref 0–0.3)
ABSOLUTE LYMPH #: 1.9 K/UL (ref 1–4.8)
ABSOLUTE MONO #: 1.3 K/UL (ref 0.1–1.2)
ALBUMIN SERPL-MCNC: 4.1 G/DL (ref 3.5–5.2)
ALBUMIN/GLOBULIN RATIO: 1.5 (ref 1–2.5)
ALP BLD-CCNC: 79 U/L (ref 40–129)
ALT SERPL-CCNC: 33 U/L (ref 5–41)
AMORPHOUS: ABNORMAL
ANION GAP SERPL CALCULATED.3IONS-SCNC: 14 MMOL/L (ref 9–17)
AST SERPL-CCNC: 23 U/L
BACTERIA: ABNORMAL
BASOPHILS # BLD: 1 % (ref 0–2)
BASOPHILS ABSOLUTE: 0.1 K/UL (ref 0–0.2)
BILIRUB SERPL-MCNC: 0.5 MG/DL (ref 0.3–1.2)
BILIRUBIN DIRECT: 0.16 MG/DL
BILIRUBIN URINE: NEGATIVE
BILIRUBIN, INDIRECT: 0.34 MG/DL (ref 0–1)
BUN BLDV-MCNC: 17 MG/DL (ref 6–20)
BUN/CREAT BLD: ABNORMAL (ref 9–20)
CALCIUM SERPL-MCNC: 9.9 MG/DL (ref 8.6–10.4)
CASTS UA: ABNORMAL /LPF
CHLORIDE BLD-SCNC: 98 MMOL/L (ref 98–107)
CO2: 24 MMOL/L (ref 20–31)
COLOR: ABNORMAL
COMMENT UA: ABNORMAL
CREAT SERPL-MCNC: 1.15 MG/DL (ref 0.7–1.2)
CRYSTALS, UA: ABNORMAL /HPF
DIFFERENTIAL TYPE: ABNORMAL
EOSINOPHILS RELATIVE PERCENT: 3 % (ref 1–4)
EPITHELIAL CELLS UA: ABNORMAL /HPF (ref 0–5)
GFR AFRICAN AMERICAN: >60 ML/MIN
GFR NON-AFRICAN AMERICAN: >60 ML/MIN
GFR SERPL CREATININE-BSD FRML MDRD: ABNORMAL ML/MIN/{1.73_M2}
GFR SERPL CREATININE-BSD FRML MDRD: ABNORMAL ML/MIN/{1.73_M2}
GLOBULIN: NORMAL G/DL (ref 1.5–3.8)
GLUCOSE BLD-MCNC: 163 MG/DL (ref 70–99)
GLUCOSE URINE: NEGATIVE
HCT VFR BLD CALC: 45.8 % (ref 41–53)
HEMOGLOBIN: 15.8 G/DL (ref 13.5–17.5)
IMMATURE GRANULOCYTES: ABNORMAL %
KETONES, URINE: NEGATIVE
LEUKOCYTE ESTERASE, URINE: ABNORMAL
LIPASE: 31 U/L (ref 13–60)
LYMPHOCYTES # BLD: 17 % (ref 24–44)
MCH RBC QN AUTO: 32.6 PG (ref 26–34)
MCHC RBC AUTO-ENTMCNC: 34.5 G/DL (ref 31–37)
MCV RBC AUTO: 94.5 FL (ref 80–100)
MONOCYTES # BLD: 12 % (ref 2–11)
MUCUS: ABNORMAL
NITRITE, URINE: NEGATIVE
NRBC AUTOMATED: ABNORMAL PER 100 WBC
OTHER OBSERVATIONS UA: ABNORMAL
PDW BLD-RTO: 13 % (ref 12.5–15.4)
PH UA: 7 (ref 5–8)
PLATELET # BLD: 250 K/UL (ref 140–450)
PLATELET ESTIMATE: ABNORMAL
PMV BLD AUTO: 9.7 FL (ref 6–12)
POTASSIUM SERPL-SCNC: 3.6 MMOL/L (ref 3.7–5.3)
PROTEIN UA: NEGATIVE
RBC # BLD: 4.84 M/UL (ref 4.5–5.9)
RBC # BLD: ABNORMAL 10*6/UL
RBC UA: ABNORMAL /HPF (ref 0–2)
RENAL EPITHELIAL, UA: ABNORMAL /HPF
SEG NEUTROPHILS: 67 % (ref 36–66)
SEGMENTED NEUTROPHILS ABSOLUTE COUNT: 7.6 K/UL (ref 1.8–7.7)
SODIUM BLD-SCNC: 136 MMOL/L (ref 135–144)
SPECIFIC GRAVITY UA: 1.01 (ref 1–1.03)
TOTAL PROTEIN: 6.9 G/DL (ref 6.4–8.3)
TRICHOMONAS: ABNORMAL
TURBIDITY: ABNORMAL
URINE HGB: ABNORMAL
UROBILINOGEN, URINE: NORMAL
WBC # BLD: 11.2 K/UL (ref 3.5–11)
WBC # BLD: ABNORMAL 10*3/UL
WBC UA: ABNORMAL /HPF (ref 0–5)
YEAST: ABNORMAL

## 2020-08-21 PROCEDURE — 96375 TX/PRO/DX INJ NEW DRUG ADDON: CPT

## 2020-08-21 PROCEDURE — 2580000003 HC RX 258: Performed by: PHYSICIAN ASSISTANT

## 2020-08-21 PROCEDURE — 36415 COLL VENOUS BLD VENIPUNCTURE: CPT

## 2020-08-21 PROCEDURE — 80048 BASIC METABOLIC PNL TOTAL CA: CPT

## 2020-08-21 PROCEDURE — 6370000000 HC RX 637 (ALT 250 FOR IP): Performed by: PHYSICIAN ASSISTANT

## 2020-08-21 PROCEDURE — 96365 THER/PROPH/DIAG IV INF INIT: CPT

## 2020-08-21 PROCEDURE — 81001 URINALYSIS AUTO W/SCOPE: CPT

## 2020-08-21 PROCEDURE — 74176 CT ABD & PELVIS W/O CONTRAST: CPT

## 2020-08-21 PROCEDURE — 83690 ASSAY OF LIPASE: CPT

## 2020-08-21 PROCEDURE — 6360000002 HC RX W HCPCS: Performed by: PHYSICIAN ASSISTANT

## 2020-08-21 PROCEDURE — 85025 COMPLETE CBC W/AUTO DIFF WBC: CPT

## 2020-08-21 PROCEDURE — 80076 HEPATIC FUNCTION PANEL: CPT

## 2020-08-21 PROCEDURE — 99284 EMERGENCY DEPT VISIT MOD MDM: CPT

## 2020-08-21 RX ORDER — KETOROLAC TROMETHAMINE 30 MG/ML
30 INJECTION, SOLUTION INTRAMUSCULAR; INTRAVENOUS ONCE
Status: COMPLETED | OUTPATIENT
Start: 2020-08-21 | End: 2020-08-21

## 2020-08-21 RX ORDER — TAMSULOSIN HYDROCHLORIDE 0.4 MG/1
0.4 CAPSULE ORAL DAILY
Qty: 14 CAPSULE | Refills: 0 | Status: ON HOLD | OUTPATIENT
Start: 2020-08-21 | End: 2020-11-06 | Stop reason: HOSPADM

## 2020-08-21 RX ORDER — TAMSULOSIN HYDROCHLORIDE 0.4 MG/1
0.4 CAPSULE ORAL ONCE
Status: COMPLETED | OUTPATIENT
Start: 2020-08-21 | End: 2020-08-21

## 2020-08-21 RX ORDER — OXYCODONE HYDROCHLORIDE AND ACETAMINOPHEN 5; 325 MG/1; MG/1
1 TABLET ORAL EVERY 8 HOURS PRN
Qty: 12 TABLET | Refills: 0 | Status: SHIPPED | OUTPATIENT
Start: 2020-08-21 | End: 2020-08-25

## 2020-08-21 RX ORDER — CYCLOBENZAPRINE HCL 10 MG
10 TABLET ORAL 3 TIMES DAILY PRN
Qty: 15 TABLET | Refills: 0 | Status: SHIPPED | OUTPATIENT
Start: 2020-08-21 | End: 2020-08-26

## 2020-08-21 RX ORDER — ONDANSETRON 4 MG/1
4 TABLET, FILM COATED ORAL EVERY 8 HOURS PRN
Qty: 20 TABLET | Refills: 0 | Status: SHIPPED | OUTPATIENT
Start: 2020-08-21 | End: 2021-07-15

## 2020-08-21 RX ORDER — CEPHALEXIN 500 MG/1
500 CAPSULE ORAL 4 TIMES DAILY
Qty: 40 CAPSULE | Refills: 0 | Status: SHIPPED | OUTPATIENT
Start: 2020-08-21 | End: 2020-08-31

## 2020-08-21 RX ORDER — 0.9 % SODIUM CHLORIDE 0.9 %
1000 INTRAVENOUS SOLUTION INTRAVENOUS ONCE
Status: COMPLETED | OUTPATIENT
Start: 2020-08-21 | End: 2020-08-21

## 2020-08-21 RX ADMIN — KETOROLAC TROMETHAMINE 30 MG: 30 INJECTION, SOLUTION INTRAMUSCULAR; INTRAVENOUS at 13:19

## 2020-08-21 RX ADMIN — CEFTRIAXONE SODIUM 1 G: 1 INJECTION, POWDER, FOR SOLUTION INTRAMUSCULAR; INTRAVENOUS at 14:13

## 2020-08-21 RX ADMIN — SODIUM CHLORIDE 1000 ML: 9 INJECTION, SOLUTION INTRAVENOUS at 13:19

## 2020-08-21 RX ADMIN — TAMSULOSIN HYDROCHLORIDE 0.4 MG: 0.4 CAPSULE ORAL at 14:48

## 2020-08-21 ASSESSMENT — PAIN DESCRIPTION - PROGRESSION
CLINICAL_PROGRESSION: GRADUALLY WORSENING
CLINICAL_PROGRESSION: GRADUALLY IMPROVING
CLINICAL_PROGRESSION: GRADUALLY IMPROVING

## 2020-08-21 ASSESSMENT — ENCOUNTER SYMPTOMS
ABDOMINAL PAIN: 1
EYE REDNESS: 0
SORE THROAT: 0
COUGH: 0
EYE DISCHARGE: 0
NAUSEA: 0
VOMITING: 0
SHORTNESS OF BREATH: 0

## 2020-08-21 ASSESSMENT — PAIN DESCRIPTION - PAIN TYPE: TYPE: ACUTE PAIN

## 2020-08-21 ASSESSMENT — PAIN SCALES - GENERAL
PAINLEVEL_OUTOF10: 9
PAINLEVEL_OUTOF10: 4
PAINLEVEL_OUTOF10: 4
PAINLEVEL_OUTOF10: 9

## 2020-08-21 ASSESSMENT — PAIN DESCRIPTION - ORIENTATION: ORIENTATION: RIGHT;LOWER

## 2020-08-21 ASSESSMENT — PAIN DESCRIPTION - LOCATION: LOCATION: ABDOMEN;FLANK

## 2020-08-21 ASSESSMENT — PAIN DESCRIPTION - DESCRIPTORS: DESCRIPTORS: SHARP

## 2020-08-21 ASSESSMENT — PAIN DESCRIPTION - ONSET: ONSET: ON-GOING

## 2020-08-21 ASSESSMENT — PAIN DESCRIPTION - FREQUENCY: FREQUENCY: CONTINUOUS

## 2020-08-21 NOTE — ED PROVIDER NOTES
[address] [facility] ED    [address] [facility] [address]  Phone: [phone]  Fax: [phone]  Emergency Department  Faculty Attestation    I performed a history and physical examination of the patient and discussed management with the mid level provideer. I reviewed the mid level provider's note and agree with the documented findings and plan of care. Any areas of disagreement are noted on the chart. I was personally present for the key portions of any procedures. I have documented in the chart those procedures where I was not present during the key portions. I have reviewed the emergency nurses triage note. I agree with the chief complaint, past medical history, past surgical history, allergies, medications, social and family history as documented unless otherwise noted below. Documentation of the HPI, Physical Exam and Medical Decision Making performed by medical students or scribes is based on my personal performance of the HPI, PE and MDM. For Physician Assistant/ Nurse Practitioner cases/documentation I have personally evaluated this patient and have completed at least one if not all key elements of the E/M (history, physical exam, and MDM). Additional findings are as noted. Primary Care Physician:  ART Peters - CNP    CHIEF COMPLAINT       Chief Complaint   Patient presents with    Flank Pain     stent on tuesday for kidney stone had laser surgery stent out on friday.  nor right flank pain and right groin pain taking percocet 1900 to 0400 took 5 percocet another percocet at 1000       RECENT VITALS:   Temp: 97.9 °F (36.6 °C),  Pulse: 78, Resp: 19, BP: (!) 164/107    LABS:  Labs Reviewed   BASIC METABOLIC PANEL W/ REFLEX TO MG FOR LOW K - Abnormal; Notable for the following components:       Result Value    Glucose 163 (*)     Potassium 3.6 (*)     All other components within normal limits   URINE RT REFLEX TO CULTURE - Abnormal; Notable for the following components: H/O   kidney stone with recent stent placement   TECHNOLOGIST PROVIDED HISTORY:   right flank radiating to the RLQ; H/O kidney stone with recent stent placement     Reason for Exam: kidney stent removed today   Acuity: Acute   Type of Exam: Initial   Additional signs and symptoms: pain   Relevant Medical/Surgical History: hx HTN and DM, kidney stones     FINDINGS:   Lower Chest: No acute infiltrate at the lung bases. Organs: Hepatic steatosis with no focal abnormality.  No acute biliary   findings.  The spleen, pancreas and adrenal glands are unremarkable. The calculus noted previously in the right renal pelvis now projects in the   distal ureter with mild right-sided hydronephrosis and hydroureter and   infiltration of the perinephric and periureteric fat.  The calculus measures   5 mm in diameter and about 9 mm in length.  Additional nonobstructive   bilateral nephrolithiasis. Elesa Pound is a punctate calculus at the level of the   right UVJ. GI/Bowel: No pericolonic inflammatory changes.  Diverticulosis with no acute   features.  The appendix is unremarkable.  No small bowel distension.  The   stomach and duodenal sweep are intact. Pelvis: No pelvic mass or free pelvic fluid.  The prostate is not enlarged. Mild distention of the urinary bladder. Peritoneum/Retroperitoneum: The abdominal aorta is normal in caliber with   mild diffuse atherosclerotic plaque.  No retroperitoneal adenopathy or upper   abdominal ascites. Bones/Soft Tissues: No acute osseous or soft tissue abnormality.  Fat   containing umbilical hernia.                        PERTINENT ATTENDING PHYSICIAN COMMENTS:    The patient presents with right sided abdominal pain and some mild bloating. He's had some constipation after taking opioids for a kidney stone. The patient had a ureteral stent. He pulled it out a few days ago and now he's been having discomfort.   On exam, the patient has some mild bloating and discomfort in the right lower quadrant. He has no true distention or tympany. The patient's labs demonstrate some white cells in his urine and continue blood. His CT does show some continued retained stones. We contacted Dr. Aadn Alex. He feels the patient may have some edema having removed the stent. We will put him on some Flomax. He will be discharged home with antibiotics as well. He is to follow-up with the urologist.  The patient is discharged in good condition.          Ana Myrick MD  08/21/20 6406

## 2020-08-21 NOTE — TELEPHONE ENCOUNTER
Patient spouse left a voicemail on our line stating that patient was in a tremendous amount of pain after having sx with Dr Jonathan Ulloa.  Once call was able to be returned patient was already in the ER due to the pain

## 2020-08-21 NOTE — ED PROVIDER NOTES
[id number] [facility] ED  [address] [facility] [address] [facility] [address]  Phone: [phone]  Fax: [phone]      Pt Name: Carlotta Alcaraz  MRN: [id number]  [place] 1965  Date of evaluation: 8/21/2020      CHIEF COMPLAINT       Chief Complaint   Patient presents with    Flank Pain     stent on tuesday for kidney stone had laser surgery stent out on friday. nor right flank pain and right groin pain taking percocet 1900 to 0400 took 5 percocet another percocet at 1000       HISTORY OF PRESENT ILLNESS   (Location, Quality, Severity, Duration, Timing, Context, ModifyingFactors, Associated Signs and Symptoms)     Carlotta Alcaraz is a 48 y.o. male who presents to the ER for evaluation of right flank pain and RLQ abdominal pain. Patient states that on Tuesday of this week he had a renal stent placed after undergoing laser and soundwave treatment for a 9 mm kidney stone on the right. Patient has passed several stones this week. He was told that he could remove the stent on Friday. Patient states that he took the stent out around 2 AM today. Shortly after removal of the stent, patient states that he had increased right flank discomfort as well as pain in the right lower quadrant of the abdomen. Patient has had no associated nausea or vomiting. He has been taking Percocet for pain with minimal relief. He is also taking Advil for discomfort. Patient has had no fevers or chills. He rates his acute pain at 9/10. Nursing Notes were reviewed. REVIEW OF SYSTEMS     (2-9 systems for level 4, 10 or more for level 5)    Review of Systems   Constitutional: Negative for chills and fever. HENT: Negative for congestion, ear pain and sore throat. Eyes: Negative for discharge and redness. Respiratory: Negative for cough and shortness of breath. Cardiovascular: Negative for chest pain. Gastrointestinal: Positive for abdominal pain. Negative for nausea and vomiting.    Genitourinary: Positive cigarettes. He has a 70.00 pack-year smoking history. He has never used smokeless tobacco. He reports current alcohol use of about 12.0 standard drinks of alcohol per week. He reports that he does not use drugs. PHYSICAL EXAM     (7 for level 4, 8 or more for level 5)    ED Triage Vitals [08/21/20 1222]   BP Temp Temp Source Pulse Resp SpO2 Height Weight   (!) 164/107 97.9 °F (36.6 °C) Oral 78 19 96 % -- --     Physical Exam  Vitals signs reviewed. Constitutional:       General: He is not in acute distress. Appearance: He is obese. He is not toxic-appearing. HENT:      Head: Normocephalic and atraumatic. Eyes:      General: No scleral icterus. Neck:      Musculoskeletal: Normal range of motion and neck supple. Cardiovascular:      Rate and Rhythm: Normal rate and regular rhythm. Heart sounds: Normal heart sounds. Pulmonary:      Effort: Pulmonary effort is normal. No respiratory distress. Breath sounds: Normal breath sounds. Abdominal:      General: Bowel sounds are decreased. Tenderness: There is abdominal tenderness in the right lower quadrant. There is no right CVA tenderness, guarding or rebound. Skin:     General: Skin is warm and dry. Findings: No rash. Neurological:      General: No focal deficit present. Mental Status: He is alert. Psychiatric:         Mood and Affect: Mood normal.         Behavior: Behavior normal.       DIAGNOSTIC RESULTS     RADIOLOGY:   Radiology images were visualized by myself. The Radiologist interpretations were reviewed and are as follows:     CT ABDOMEN PELVIS WO CONTRAST Additional Contrast? None (Final result)   Result time 08/21/20 13:47:20   Final result by Pro Montiel MD (08/21/20 13:47:20)                 Impression:     1. Passage of stone from the right renal pelvis to the distal right ureter   with mild hydronephrosis.  Additional punctate right UVJ calculus. Nonobstructive bilateral nephrolithiasis.    2. Otherwise stable CT of the abdomen and pelvis.  Hepatic steatosis. Diverticulosis with no acute features. Narrative:     EXAMINATION:   CT OF THE ABDOMEN AND PELVIS WITHOUT CONTRAST 8/21/2020 1:28 pm     TECHNIQUE:   CT of the abdomen and pelvis was performed without the administration of   intravenous contrast. Multiplanar reformatted images are provided for review. Dose modulation, iterative reconstruction, and/or weight based adjustment of   the mA/kV was utilized to reduce the radiation dose to as low as reasonably   achievable. COMPARISON:   08/17/2020. HISTORY:   ORDERING SYSTEM PROVIDED HISTORY: right flank radiating to the RLQ; H/O   kidney stone with recent stent placement   TECHNOLOGIST PROVIDED HISTORY:   right flank radiating to the RLQ; H/O kidney stone with recent stent placement     Reason for Exam: kidney stent removed today   Acuity: Acute   Type of Exam: Initial   Additional signs and symptoms: pain   Relevant Medical/Surgical History: hx HTN and DM, kidney stones     FINDINGS:   Lower Chest: No acute infiltrate at the lung bases. Organs: Hepatic steatosis with no focal abnormality.  No acute biliary   findings.  The spleen, pancreas and adrenal glands are unremarkable. The calculus noted previously in the right renal pelvis now projects in the   distal ureter with mild right-sided hydronephrosis and hydroureter and   infiltration of the perinephric and periureteric fat.  The calculus measures   5 mm in diameter and about 9 mm in length.  Additional nonobstructive   bilateral nephrolithiasis. Dickey Reji is a punctate calculus at the level of the   right UVJ. GI/Bowel: No pericolonic inflammatory changes.  Diverticulosis with no acute   features.  The appendix is unremarkable.  No small bowel distension.  The   stomach and duodenal sweep are intact. Pelvis: No pelvic mass or free pelvic fluid.  The prostate is not enlarged. Mild distention of the urinary bladder. Peritoneum/Retroperitoneum: The abdominal aorta is normal in caliber with   mild diffuse atherosclerotic plaque.  No retroperitoneal adenopathy or upper   abdominal ascites.      Bones/Soft Tissues: No acute osseous or soft tissue abnormality.  Fat   containing umbilical hernia.                      LABS:  Results for orders placed or performed during the hospital encounter of 78/64/14   Basic Metabolic Panel w/ Reflex to MG   Result Value Ref Range    Glucose 163 (H) 70 - 99 mg/dL    BUN 17 6 - 20 mg/dL    CREATININE 1.15 0.70 - 1.20 mg/dL    Bun/Cre Ratio NOT REPORTED 9 - 20    Calcium 9.9 8.6 - 10.4 mg/dL    Sodium 136 135 - 144 mmol/L    Potassium 3.6 (L) 3.7 - 5.3 mmol/L    Chloride 98 98 - 107 mmol/L    CO2 24 20 - 31 mmol/L    Anion Gap 14 9 - 17 mmol/L    GFR Non-African American >60 >60 mL/min    GFR African American >60 >60 mL/min    GFR Comment          GFR Staging NOT REPORTED    Lipase   Result Value Ref Range    Lipase 31 13 - 60 U/L   Hepatic Function Panel   Result Value Ref Range    Alb 4.1 3.5 - 5.2 g/dL    Alkaline Phosphatase 79 40 - 129 U/L    ALT 33 5 - 41 U/L    AST 23 <40 U/L    Total Bilirubin 0.50 0.3 - 1.2 mg/dL    Bilirubin, Direct 0.16 <0.31 mg/dL    Bilirubin, Indirect 0.34 0.00 - 1.00 mg/dL    Total Protein 6.9 6.4 - 8.3 g/dL    Globulin NOT REPORTED 1.5 - 3.8 g/dL    Albumin/Globulin Ratio 1.5 1.0 - 2.5   Urinalysis Reflex to Culture    Specimen: Urine, clean catch   Result Value Ref Range    Color, UA DARK YELLOW (A) YELLOW    Turbidity UA CLOUDY (A) CLEAR    Glucose, Ur NEGATIVE NEGATIVE    Bilirubin Urine NEGATIVE NEGATIVE    Ketones, Urine NEGATIVE NEGATIVE    Specific Gravity, UA 1.015 1.005 - 1.030    Urine Hgb LARGE (A) NEGATIVE    pH, UA 7.0 5.0 - 8.0    Protein, UA NEGATIVE NEGATIVE    Urobilinogen, Urine Normal Normal    Nitrite, Urine NEGATIVE NEGATIVE    Leukocyte Esterase, Urine TRACE (A) NEGATIVE    Urinalysis Comments NOT REPORTED    Microscopic Urinalysis   Result Value Ref Range    -          WBC, UA 5 TO 10 0 - 5 /HPF    RBC, UA 50  0 - 2 /HPF    Casts UA NOT REPORTED /LPF    Crystals, UA NOT REPORTED None /HPF    Epithelial Cells UA 0 TO 2 0 - 5 /HPF    Renal Epithelial, UA NOT REPORTED 0 /HPF    Bacteria, UA None None    Mucus, UA NOT REPORTED None    Trichomonas, UA NOT REPORTED None    Amorphous, UA NOT REPORTED None    Other Observations UA (A) NOT REQ. Utilizing a urinalysis as the only screening method to exclude a potential uropathogen can be unreliable in many patient populations. Rapid screening tests are less sensitive than culture and if UTI is a clinical possibility, culture should be considered despite a negative urinalysis. Yeast, UA NOT REPORTED None   CBC Auto Differential   Result Value Ref Range    WBC 11.2 (H) 3.5 - 11.0 k/uL    RBC 4.84 4.5 - 5.9 m/uL    Hemoglobin 15.8 13.5 - 17.5 g/dL    Hematocrit 45.8 41 - 53 %    MCV 94.5 80 - 100 fL    MCH 32.6 26 - 34 pg    MCHC 34.5 31 - 37 g/dL    RDW 13.0 12.5 - 15.4 %    Platelets 289 988 - 224 k/uL    MPV 9.7 6.0 - 12.0 fL    NRBC Automated NOT REPORTED per 100 WBC    Differential Type NOT REPORTED     Seg Neutrophils 67 (H) 36 - 66 %    Lymphocytes 17 (L) 24 - 44 %    Monocytes 12 (H) 2 - 11 %    Eosinophils % 3 1 - 4 %    Basophils 1 0 - 2 %    Immature Granulocytes NOT REPORTED 0 %    Segs Absolute 7.60 1.8 - 7.7 k/uL    Absolute Lymph # 1.90 1.0 - 4.8 k/uL    Absolute Mono # 1.30 (H) 0.1 - 1.2 k/uL    Absolute Eos # 0.30 0.0 - 0.4 k/uL    Basophils Absolute 0.10 0.0 - 0.2 k/uL    Absolute Immature Granulocyte NOT REPORTED 0.00 - 0.30 k/uL    WBC Morphology NOT REPORTED     RBC Morphology NOT REPORTED     Platelet Estimate NOT REPORTED      MDM:   Patient presents to the ER for evaluation of right flank pain and right lower quadrant abdominal pain. Patient had a 9 mm kidney stone shattered by a laser and ultrasound on Tuesday of this week. A right renal stent was placed.   The patient pulled the stent today. Shortly after removal of the stent, he developed increased right flank and right lower quadrant abdominal pain. Patient had difficulty controlling his pain with Percocet and Advil. He has had no nausea or vomiting. He has had no fevers. IV access will be obtained. Patient will be given IV hydration. Acute pain will be treated with IV Toradol. Will check labs to include CBC with differential, basic metabolic panel, lipase, liver function and urinalysis. I will obtain a CT scan of the abdomen and pelvis without IV contrast to evaluate for acute pathology. EMERGENCY DEPARTMENT COURSE:   Vitals:    Vitals:    08/21/20 1222 08/21/20 1452   BP: (!) 164/107 (!) 165/105   Pulse: 78 72   Resp: 19 18   Temp: 97.9 °F (36.6 °C)    TempSrc: Oral    SpO2: 96% 96%     -------------------------  BP: (!) 165/105, Temp: 97.9 °F (36.6 °C), Pulse: 72, Resp: 18    The patient was given the following medications:  Orders Placed This Encounter   Medications    ketorolac (TORADOL) injection 30 mg    0.9 % sodium chloride bolus    cefTRIAXone (ROCEPHIN) 1 g IVPB in 50 mL D5W minibag    tamsulosin (FLOMAX) capsule 0.4 mg    cephALEXin (KEFLEX) 500 MG capsule     Sig: Take 1 capsule by mouth 4 times daily for 10 days     Dispense:  40 capsule     Refill:  0    tamsulosin (FLOMAX) 0.4 MG capsule     Sig: Take 1 capsule by mouth daily for 14 days     Dispense:  14 capsule     Refill:  0    ondansetron (ZOFRAN) 4 MG tablet     Sig: Take 1 tablet by mouth every 8 hours as needed for Nausea     Dispense:  20 tablet     Refill:  0    oxyCODONE-acetaminophen (PERCOCET) 5-325 MG per tablet     Sig: Take 1 tablet by mouth every 8 hours as needed for Pain for up to 4 days. WARNING:  May cause drowsiness. May impair ability to operate vehicles or machinery. Do not use in combination with alcohol.      Dispense:  12 tablet     Refill:  0    cyclobenzaprine (FLEXERIL) 10 MG tablet     Sig: Take 1 tablet by mouth 3 times daily as needed for Muscle spasms     Dispense:  15 tablet     Refill:  0      Re-evaluation Notes  1:55 PM EDT  Results of the CT scan was discussed with the patient by myself. Patient has a 5 mm x 9 mm stone in the distal ureter. There is some mild right hydronephrosis and hydroureter. There also appears to be some infiltration of the perinephric and periureteral fat. We will discussed the case with the patient's urologist.  Patient will be treated with IV Rocephin. Labs are still pending. 1:58 PM  Call placed to Urology. 2:40 PM EDT  Patient was discussed with Dr. Zhane Henry by myself. He states that often patients will have edema of the ureter after removal of the stent. He feels that the patient has a collection of smaller stones making it appear as if there is a large stone. The stones are in the distal right ureter. There is some mild hydronephrosis and mild hydroureter. He feels that the patient should be able to pass the stones. He has recommended that we place the patient on Flomax. Management as an outpatient. Patient understands to return to the ER if he develops uncontrolled pain, vomiting or fever. FINAL IMPRESSION      1. Ureterolithiasis    2. Complete tear of right rotator cuff, unspecified whether traumatic        DISPOSITION/PLAN   DISPOSITION - home     Condition on Disposition  Stable    PATIENT REFERRED TO:  Luly Sosa MD  [address]    Schedule an appointment as soon as possible for a visit   For reevaluation ini 3-5 days    DISCHARGE MEDICATIONS:  Discharge Medication List as of 8/21/2020  2:51 PM      START taking these medications    Details   oxyCODONE-acetaminophen (PERCOCET) 5-325 MG per tablet Take 1 tablet by mouth every 8 hours as needed for Pain for up to 4 days. WARNING:  May cause drowsiness. May impair ability to operate vehicles or machinery.   Do not use in combination with alcohol., Disp-12 tablet,R-0Print cyclobenzaprine (FLEXERIL) 10 MG tablet Take 1 tablet by mouth 3 times daily as needed for Muscle spasms, Disp-15 tablet,R-0Print           (Please note that portions of this note were completed with a voice recognition program.  Efforts were made to edit the dictations but occasionally words are mis-transcribed.)    Makenzie Ward PA-C  08/21/20 4716

## 2020-08-21 NOTE — ED NOTES
EMILY Davis note covering [place].   [address] paged through perfect serve at [phone]          Deisy Brothers RN  08/21/20 4310

## 2020-08-21 NOTE — ED NOTES
Pt. Ambulatory to room # 4 from waiting area with his wife, gait steady. Pt. C/o right flank and right abdominal/ groin pain. Pt. States he had laser surgery on Tuesday for a kidney stone and had a stent placed. Pt. States he was told to take stent out Friday and he did. Pt. states he is taking percocet for pain,but not helping Pt. States he took a total of 5 percocet between 1900 to 0400 and another percocet at 1000. Pt. States he did take advil last night and helped the pain and was able to get 2 hours of sleep. Pt. Alert and oriented x4. RR equal and non labored. NAD noted. Call light within reach.       Bassam Larsen RN  08/21/20 9007

## 2020-10-08 ENCOUNTER — TELEPHONE (OUTPATIENT)
Dept: ADMINISTRATIVE | Age: 55
End: 2020-10-08

## 2020-10-08 NOTE — TELEPHONE ENCOUNTER
Patients wife called to ask Dr. Pete Donovan for a referral for  Virginia Lomeli to see a surgeon. He needs surgery for 7 hernia's that were found. Former patient of [facility] and will be future the patient of Dr. Pete Donovan.   Please contact Kevyn [place] Lomeli ([phone]) regarding referral.

## 2020-10-08 NOTE — TELEPHONE ENCOUNTER
Tried to call patient and got VM that is full and not able to accept messages. Spoke with Dr. Kerrie Cutler and I have put in referral for Dr. Leonel Younger in general surgery. Please let patient know. Thanks.

## 2020-10-15 ENCOUNTER — OFFICE VISIT (OUTPATIENT)
Dept: SURGERY | Age: 55
End: 2020-10-15
Payer: COMMERCIAL

## 2020-10-15 VITALS
BODY MASS INDEX: 37.52 KG/M2 | WEIGHT: 277 LBS | DIASTOLIC BLOOD PRESSURE: 82 MMHG | HEART RATE: 85 BPM | HEIGHT: 72 IN | SYSTOLIC BLOOD PRESSURE: 131 MMHG

## 2020-10-15 PROCEDURE — 99212 OFFICE O/P EST SF 10 MIN: CPT | Performed by: SURGERY

## 2020-10-15 ASSESSMENT — ENCOUNTER SYMPTOMS
RESPIRATORY NEGATIVE: 1
GASTROINTESTINAL NEGATIVE: 1

## 2020-10-15 NOTE — H&P
Mariela Oscar [address] [facility] [address] [place]. [address]   1965   [id number]     Lana Coronel, APRN - TANESHA Chavez, APRN - CNP  [address] [facility] [address].     Chief Complaint   Patient presents with    New Patient     Ventral Hernia         History of Present Illness:    Patient is seen in evaluation of ventral and possible inguinal hernias. Has been present for several months. Some recent increase in size and discomfort in the periumbilical region. .  No specific inciting event. He denies any lump or bulge or pain in the right inguinal region. Prior CT revealed adjacent ventral hernias near the umbilicus and an apparent fat-containing hernia in the right groin. Review of Systems   Constitutional: Negative. Respiratory: Negative. Cardiovascular: Negative. Gastrointestinal: Negative. Musculoskeletal: Negative. Neurological: Negative.          No Known Allergies     Current Outpatient Medications   Medication Sig Dispense Refill    ondansetron (ZOFRAN) 4 MG tablet Take 1 tablet by mouth every 8 hours as needed for Nausea 20 tablet 0    atorvastatin (LIPITOR) 10 MG tablet Take 1 tablet by mouth nightly 90 tablet 1    aspirin EC 81 MG EC tablet Take 1 tablet by mouth daily 90 tablet 1    omeprazole (PRILOSEC) 20 MG delayed release capsule Take 1 capsule by mouth Daily 90 capsule 1    metoprolol tartrate (LOPRESSOR) 50 MG tablet Take 1 tablet by mouth daily 90 tablet 1    losartan (COZAAR) 100 MG tablet Take 1 tablet by mouth daily 90 tablet 1    hydroCHLOROthiazide (HYDRODIURIL) 25 MG tablet Take 1 tablet by mouth daily 90 tablet 1    sertraline (ZOLOFT) 50 MG tablet Take 1.5 tablets by mouth daily 135 tablet 1    metFORMIN (GLUCOPHAGE) 500 MG tablet Take 1 tablet by mouth 2 times daily (with meals) 180 tablet 1    tamsulosin (FLOMAX) 0.4 MG capsule Take 1 capsule by mouth daily for 14 days 14 capsule 0     No current facility-administered medications for this visit. Past Medical History:   Diagnosis Date    Arthritis     Diabetes mellitus ([address]     GERD (gastroesophageal reflux disease)     Hyperlipidemia     Hypertension     Kidney stone     Positive colorectal cancer screening using Cologuard test 2019        No family history on file. Past Surgical History:   Procedure Laterality Date    BICEPS TENDON REPAIR Right 2020    BICEPS TENDON REPAIR - ARTHROSCOPIC BICEPS TENODESIS performed by Essie Simmons MD at [address] COLONOSCOPY N/A 2019    COLORECTAL CANCER SCREENING, NOT HIGH RISK performed by Matthew Borrero MD at [address] Right 2020    CYSTO URETEROSCOPY HOLMIUM LASER W/STENT INSERTION performed by Aurelio Sewell MD at [address] ARTHROSCOPY Right 2020    ROTATOR CUFF REPAIR WITH ARTHREX, Biceps tendon repair    SHOULDER ARTHROSCOPY Right 2020    SHOULDER ARTHROSCOPY ROTATOR CUFF REPAIR WITH ARTHREX performed by Essie Simmons MD at [facility] History     Socioeconomic History    Marital status:      Spouse name: Not on file    Number of children: Not on file    Years of education: Not on file    Highest education level: Not on file   Occupational History    Not on file   Social Needs    Financial resource strain: Not on file    Food insecurity     Worry: Not on file     Inability: Not on file    Transportation needs     Medical: Not on file     Non-medical: Not on file   Tobacco Use    Smoking status: Former Smoker     Packs/day: 2.00     Years: 35.00     Pack years: 70.00     Types: Cigarettes     Last attempt to quit:      Years since quittin.7    Smokeless tobacco: Never Used   Substance and Sexual Activity    Alcohol use:  Yes     Alcohol/week: 12.0 standard

## 2020-10-15 NOTE — H&P (VIEW-ONLY)
Mariela Oscar [address] [facility] [address]   1965   [id number]     ART Jiménez - ART Garcia - CNP  [address] [facility], [place] [address].     Chief Complaint   Patient presents with    New Patient     Ventral Hernia         History of Present Illness:    Patient is seen in evaluation of ventral and possible inguinal hernias. Has been present for several months. Some recent increase in size and discomfort in the periumbilical region. .  No specific inciting event. He denies any lump or bulge or pain in the right inguinal region. Prior CT revealed adjacent ventral hernias near the umbilicus and an apparent fat-containing hernia in the right groin. Review of Systems   Constitutional: Negative. Respiratory: Negative. Cardiovascular: Negative. Gastrointestinal: Negative. Musculoskeletal: Negative. Neurological: Negative.          No Known Allergies     Current Outpatient Medications   Medication Sig Dispense Refill    ondansetron (ZOFRAN) 4 MG tablet Take 1 tablet by mouth every 8 hours as needed for Nausea 20 tablet 0    atorvastatin (LIPITOR) 10 MG tablet Take 1 tablet by mouth nightly 90 tablet 1    aspirin EC 81 MG EC tablet Take 1 tablet by mouth daily 90 tablet 1    omeprazole (PRILOSEC) 20 MG delayed release capsule Take 1 capsule by mouth Daily 90 capsule 1    metoprolol tartrate (LOPRESSOR) 50 MG tablet Take 1 tablet by mouth daily 90 tablet 1    losartan (COZAAR) 100 MG tablet Take 1 tablet by mouth daily 90 tablet 1    hydroCHLOROthiazide (HYDRODIURIL) 25 MG tablet Take 1 tablet by mouth daily 90 tablet 1    sertraline (ZOLOFT) 50 MG tablet Take 1.5 tablets by mouth daily 135 tablet 1    metFORMIN (GLUCOPHAGE) 500 MG tablet Take 1 tablet by mouth 2 times daily (with meals) 180 tablet 1    tamsulosin (FLOMAX) 0.4 MG capsule Take 1 capsule by mouth daily for 14 days 14 capsule 0     No current facility-administered medications for this visit. Past Medical History:   Diagnosis Date    Arthritis     Diabetes mellitus ([address]     GERD (gastroesophageal reflux disease)     Hyperlipidemia     Hypertension     Kidney stone     Positive colorectal cancer screening using Cologuard test 2019        No family history on file. Past Surgical History:   Procedure Laterality Date    BICEPS TENDON REPAIR Right 2020    BICEPS TENDON REPAIR - ARTHROSCOPIC BICEPS TENODESIS performed by Cyndi Noble MD at [address] COLONOSCOPY N/A 2019    COLORECTAL CANCER SCREENING, NOT HIGH RISK performed by Darby Lizama MD at [address] Right 2020    CYSTO URETEROSCOPY HOLMIUM LASER W/STENT INSERTION performed by Jonathan Medellin MD at [address] ARTHROSCOPY Right 2020    ROTATOR CUFF REPAIR WITH ARTHREX, Biceps tendon repair    SHOULDER ARTHROSCOPY Right 2020    SHOULDER ARTHROSCOPY ROTATOR CUFF REPAIR WITH ARTHREX performed by Cyndi Noble MD at [facility] History     Socioeconomic History    Marital status:      Spouse name: Not on file    Number of children: Not on file    Years of education: Not on file    Highest education level: Not on file   Occupational History    Not on file   Social Needs    Financial resource strain: Not on file    Food insecurity     Worry: Not on file     Inability: Not on file    Transportation needs     Medical: Not on file     Non-medical: Not on file   Tobacco Use    Smoking status: Former Smoker     Packs/day: 2.00     Years: 35.00     Pack years: 70.00     Types: Cigarettes     Last attempt to quit:      Years since quittin.7    Smokeless tobacco: Never Used   Substance and Sexual Activity    Alcohol use:  Yes     Alcohol/week: 12.0 standard drinks     Types: 12 Cans of beer per week     Comment: Every other day    Drug use: Never    Sexual activity: Yes   Lifestyle    Physical activity     Days per week: Not on file     Minutes per session: Not on file    Stress: Not on file   Relationships    Social connections     Talks on phone: Not on file     Gets together: Not on file     Attends Latter day service: Not on file     Active member of club or organization: Not on file     Attends meetings of clubs or organizations: Not on file     Relationship status: Not on file    Intimate partner violence     Fear of current or ex partner: Not on file     Emotionally abused: Not on file     Physically abused: Not on file     Forced sexual activity: Not on file   Other Topics Concern    Not on file   Social History Narrative    Not on file        Vitals:    10/15/20 1332   BP: 131/82   Pulse: 85   Weight: 277 lb (125.6 kg)   Height: 6' (1.829 m)        Physical Exam  Constitutional:       Appearance: He is well-developed. HENT:      Head: Normocephalic and atraumatic. Neck:      Musculoskeletal: Normal range of motion and neck supple. Cardiovascular:      Rate and Rhythm: Normal rate and regular rhythm. Pulmonary:      Effort: Pulmonary effort is normal.      Breath sounds: Normal breath sounds. Abdominal:      General: Bowel sounds are normal.      Palpations: Abdomen is soft. Tenderness: There is abdominal tenderness in the periumbilical area. Comments: No hernias palpable in the right inguinal region. Musculoskeletal: Normal range of motion. Skin:     General: Skin is warm and dry. Neurological:      Mental Status: He is alert and oriented to person, place, and time. Psychiatric:         Behavior: Behavior normal.          Review:     Diagnosis Orders   1. Ventral hernia without obstruction or gangrene     X2    Plan: Operative repair of hernias recommended.   Laparoscopic, robot assisted vs. Open repair discussed with patient. Has elected to proceed with laparoscopic, robot assisted/possible open repair. Risks, benefits, options and potential complications of procedure were discussed with patient and they agree to proceed. At this point observation would be recommended mended for the right inguinal CT findings as there is no apparent clinical hernia.     Electronically signed by Shreya Lutz MD on 10/15/2020 at 5:48 PM

## 2020-10-28 ENCOUNTER — ANESTHESIA EVENT (OUTPATIENT)
Dept: OPERATING ROOM | Age: 55
End: 2020-10-28
Payer: COMMERCIAL

## 2020-10-30 NOTE — PROGRESS NOTES
PAT phone interview completed. Pt reports snoring, denies chest pain or SOB episodes. Pt notified of EKG needed pre-op et pt states he will get at [address] [facility] on 11/2/20. Surgery et COVID testing date/time/location verified with pt. One visitor and masking policy reviewed with pt. Instructed pt on which BP meds to take AM of surgery.

## 2020-11-02 ENCOUNTER — HOSPITAL ENCOUNTER (OUTPATIENT)
Dept: PREADMISSION TESTING | Age: 55
Setting detail: SPECIMEN
Discharge: HOME | End: 2020-11-06
Payer: COMMERCIAL

## 2020-11-02 PROCEDURE — U0003 INFECTIOUS AGENT DETECTION BY NUCLEIC ACID (DNA OR RNA); SEVERE ACUTE RESPIRATORY SYNDROME CORONAVIRUS 2 (SARS-COV-2) (CORONAVIRUS DISEASE [COVID-19]), AMPLIFIED PROBE TECHNIQUE, MAKING USE OF HIGH THROUGHPUT TECHNOLOGIES AS DESCRIBED BY CMS-2020-01-R: HCPCS

## 2020-11-03 ENCOUNTER — OFFICE VISIT (OUTPATIENT)
Dept: UROLOGY | Age: 55
End: 2020-11-03
Payer: COMMERCIAL

## 2020-11-03 VITALS
TEMPERATURE: 97.3 F | WEIGHT: 277 LBS | BODY MASS INDEX: 38.78 KG/M2 | DIASTOLIC BLOOD PRESSURE: 82 MMHG | SYSTOLIC BLOOD PRESSURE: 132 MMHG | HEART RATE: 85 BPM | HEIGHT: 71 IN

## 2020-11-03 PROCEDURE — 3017F COLORECTAL CA SCREEN DOC REV: CPT | Performed by: UROLOGY

## 2020-11-03 PROCEDURE — G8417 CALC BMI ABV UP PARAM F/U: HCPCS | Performed by: UROLOGY

## 2020-11-03 PROCEDURE — G8427 DOCREV CUR MEDS BY ELIG CLIN: HCPCS | Performed by: UROLOGY

## 2020-11-03 PROCEDURE — 99213 OFFICE O/P EST LOW 20 MIN: CPT | Performed by: UROLOGY

## 2020-11-03 PROCEDURE — G8484 FLU IMMUNIZE NO ADMIN: HCPCS | Performed by: UROLOGY

## 2020-11-03 PROCEDURE — 1036F TOBACCO NON-USER: CPT | Performed by: UROLOGY

## 2020-11-03 ASSESSMENT — ENCOUNTER SYMPTOMS
EYE REDNESS: 0
VOMITING: 0
NAUSEA: 0
DIARRHEA: 0
SHORTNESS OF BREATH: 0
WHEEZING: 0
EYE PAIN: 0
BACK PAIN: 0
ABDOMINAL PAIN: 0
CONSTIPATION: 0
COUGH: 0

## 2020-11-03 NOTE — LETTER
[address] [address]  Dept: [phone]  Dept Fax: [phone]        11/3/20    Patient: Avani Melendrez  YOB: 1965    Dear Clifford Harrison, ART - CNP,    I had the pleasure of seeing one of your patients, Adrian Man today in the office today. Below are the relevant portions of my assessment and plan of care. IMPRESSION:  1. Kidney stones        PLAN:  Doing well after URS. Discussed stone prevention. F/U in 6 months with a KUB. Return in about 6 months (around 5/3/2021). Prescriptions Ordered:  No orders of the defined types were placed in this encounter. Orders Placed:  Orders Placed This Encounter   Procedures    XR ABDOMEN (KUB) (SINGLE AP VIEW)     Standing Status:   Future     Standing Expiration Date:   11/3/2021        Thank you for allowing me to participate in the care of this patient. I will keep you updated on this patient's follow up and I look forward to serving you and your patients again in the future.         Soledad Combs MD

## 2020-11-03 NOTE — PROGRESS NOTES
[address] [address]  Dept:  [facility] Urology Office Note - Established    Patient:  Teresa Addison  YOB: 1965  Date: 11/3/2020    The patient is a 48 y.o. male who presents todayfor evaluation of the following problems:   Chief Complaint   Patient presents with    Nephrolithiasis     S/P Cysto, (RT) URS, HLL, (RT) stent placement @ [facility] 8/18/20       HPI  He is here in follow up for a right stone. He had an ESWL followed by URS and laser litho. He is doing well. He passed a large amount of stones after the ureteroscopy. He does have some small stones in the kidneys. He is not having any flank pain at this time. Summary of old records: N/A    Additional History: N/A    Procedures Today: N/A    Urinalysis today:  No results found for this visit on 11/03/20.   Last several PSA's:  Lab Results   Component Value Date    PSA 1.33 01/17/2020    PSA 1.51 06/13/2019     Last total testosterone:  No results found for: TESTOSTERONE    AUA Symptom Score (11/3/2020):  INCOMPLETE EMPTYING: How often have you had the sensation of not emptying your bladder?: Not at all  FREQUENCY: How often do you have to urinate less than every two hours?: Not at all  INTERMITTENCY: How often have you found you stopped and started again several times when you urinated?: Not at all  URGENCY: How often have you found it difficult to postpone urination?: Not at all  WEAK STREAM: How often have you had a weak urinary stream?: Not at all  STRAINING: How often have you had to strain to start  urination?: Not at all  NOCTURIA: How many times did you typically get up at night to uriniate?: 1 Time  TOTAL I-PSS SCORE[de-identified] 1  How would you feel if you were to spend the rest of your life with your urinary condition?: Mostly Satisfied    Last BUN and creatinine:  Lab Results   Component Value Date    BUN 17 08/21/2020     Lab mouth Daily 90 capsule 1    metoprolol tartrate (LOPRESSOR) 50 MG tablet Take 1 tablet by mouth daily 90 tablet 1    losartan (COZAAR) 100 MG tablet Take 1 tablet by mouth daily 90 tablet 1    hydroCHLOROthiazide (HYDRODIURIL) 25 MG tablet Take 1 tablet by mouth daily 90 tablet 1    sertraline (ZOLOFT) 50 MG tablet Take 1.5 tablets by mouth daily 135 tablet 1    metFORMIN (GLUCOPHAGE) 500 MG tablet Take 1 tablet by mouth 2 times daily (with meals) 180 tablet 1       Patient has no known allergies. Social History     Tobacco Use   Smoking Status Former Smoker    Packs/day: 2.00    Years: 35.00    Pack years: 70.00    Types: Cigarettes    Last attempt to quit: 2013    Years since quittin.8   Smokeless Tobacco Never Used     (Ifpatient a smoker, smoking cessation counseling offered)    Social History     Substance and Sexual Activity   Alcohol Use Yes    Alcohol/week: 21.0 standard drinks    Types: 21 Shots of liquor per week    Comment: 3 drinks per day       REVIEW OF SYSTEMS:  Review of Systems    Physical Exam:      Vitals:    20 0941   BP: 132/82   Pulse: 85   Temp: 97.3 °F (36.3 °C)     Body mass index is 38.63 kg/m². Patient is a 48 y.o. male in no acute distress and alert and oriented to person, place and time. Physical Exam  Constitutional: Patient in no acute distress. Neuro: Alert and oriented to person, place and time. Psych: Mood normal, affect normal  Skin: No rash noted  Lungs: Respiratory effort is normal  Cardiovascular: Warm & Pink  Abdomen: Soft, non-tender, non-distended with no CVA,  No flank tenderness,  Or hepatosplenomegaly   Lymphatics: No palpablelymphadenopathy. Bladder non-tender and not distended. Musculoskeletal: Normal gait and station      Assessment and Plan      1. Kidney stones           Plan:          Doing well after URS. Discussed stone prevention. F/U in 6 months with a KUB. Return in about 6 months (around 5/3/2021).     Prescriptions Ordered:  No orders of the defined types were placed in this encounter. Orders Placed:  Orders Placed This Encounter   Procedures    XR ABDOMEN (KUB) (SINGLE AP VIEW)     Standing Status:   Future     Standing Expiration Date:   11/3/2021           Eleni Harrison MD    Agree with the ROS entered by the MA.

## 2020-11-05 LAB — SARS-COV-2, NAA: NOT DETECTED

## 2020-11-05 RX ORDER — SODIUM CHLORIDE 0.9 % (FLUSH) 0.9 %
10 SYRINGE (ML) INJECTION EVERY 12 HOURS SCHEDULED
Status: CANCELLED | OUTPATIENT
Start: 2020-11-05

## 2020-11-05 RX ORDER — SODIUM CHLORIDE, SODIUM LACTATE, POTASSIUM CHLORIDE, CALCIUM CHLORIDE 600; 310; 30; 20 MG/100ML; MG/100ML; MG/100ML; MG/100ML
INJECTION, SOLUTION INTRAVENOUS CONTINUOUS
Status: CANCELLED | OUTPATIENT
Start: 2020-11-05

## 2020-11-05 RX ORDER — SODIUM CHLORIDE 0.9 % (FLUSH) 0.9 %
10 SYRINGE (ML) INJECTION PRN
Status: CANCELLED | OUTPATIENT
Start: 2020-11-05

## 2020-11-05 RX ORDER — LIDOCAINE HYDROCHLORIDE 10 MG/ML
1 INJECTION, SOLUTION EPIDURAL; INFILTRATION; INTRACAUDAL; PERINEURAL
Status: CANCELLED | OUTPATIENT
Start: 2020-11-05 | End: 2020-11-05

## 2020-11-06 ENCOUNTER — HOSPITAL ENCOUNTER (OUTPATIENT)
Age: 55
Setting detail: OUTPATIENT SURGERY
Discharge: HOME | End: 2020-11-06
Attending: SURGERY | Admitting: SURGERY
Payer: COMMERCIAL

## 2020-11-06 ENCOUNTER — ANESTHESIA (OUTPATIENT)
Dept: OPERATING ROOM | Age: 55
End: 2020-11-06
Payer: COMMERCIAL

## 2020-11-06 VITALS
RESPIRATION RATE: 12 BRPM | OXYGEN SATURATION: 89 % | SYSTOLIC BLOOD PRESSURE: 125 MMHG | WEIGHT: 278.2 LBS | DIASTOLIC BLOOD PRESSURE: 74 MMHG | BODY MASS INDEX: 38.95 KG/M2 | TEMPERATURE: 97.8 F | HEART RATE: 96 BPM | HEIGHT: 71 IN

## 2020-11-06 VITALS — OXYGEN SATURATION: 94 % | DIASTOLIC BLOOD PRESSURE: 84 MMHG | TEMPERATURE: 97.2 F | SYSTOLIC BLOOD PRESSURE: 120 MMHG

## 2020-11-06 LAB — GLUCOSE BLD-MCNC: 119 MG/DL (ref 75–110)

## 2020-11-06 PROCEDURE — 49652 PR LAP, VENTRAL HERNIA REPAIR,REDUCIBLE: CPT | Performed by: SURGERY

## 2020-11-06 PROCEDURE — 3700000001 HC ADD 15 MINUTES (ANESTHESIA): Performed by: SURGERY

## 2020-11-06 PROCEDURE — 2580000003 HC RX 258: Performed by: NURSE ANESTHETIST, CERTIFIED REGISTERED

## 2020-11-06 PROCEDURE — 2500000003 HC RX 250 WO HCPCS: Performed by: NURSE ANESTHETIST, CERTIFIED REGISTERED

## 2020-11-06 PROCEDURE — S2900 ROBOTIC SURGICAL SYSTEM: HCPCS | Performed by: SURGERY

## 2020-11-06 PROCEDURE — 7100000011 HC PHASE II RECOVERY - ADDTL 15 MIN: Performed by: SURGERY

## 2020-11-06 PROCEDURE — 2709999900 HC NON-CHARGEABLE SUPPLY: Performed by: SURGERY

## 2020-11-06 PROCEDURE — 3600000019 HC SURGERY ROBOT ADDTL 15MIN: Performed by: SURGERY

## 2020-11-06 PROCEDURE — 3700000000 HC ANESTHESIA ATTENDED CARE: Performed by: SURGERY

## 2020-11-06 PROCEDURE — 7100000000 HC PACU RECOVERY - FIRST 15 MIN: Performed by: SURGERY

## 2020-11-06 PROCEDURE — 64488 TAP BLOCK BI INJECTION: CPT | Performed by: NURSE ANESTHETIST, CERTIFIED REGISTERED

## 2020-11-06 PROCEDURE — 6360000002 HC RX W HCPCS: Performed by: SURGERY

## 2020-11-06 PROCEDURE — 6360000002 HC RX W HCPCS: Performed by: NURSE ANESTHETIST, CERTIFIED REGISTERED

## 2020-11-06 PROCEDURE — 2580000003 HC RX 258: Performed by: SURGERY

## 2020-11-06 PROCEDURE — 2500000003 HC RX 250 WO HCPCS: Performed by: ANESTHESIOLOGY

## 2020-11-06 PROCEDURE — C1781 MESH (IMPLANTABLE): HCPCS | Performed by: SURGERY

## 2020-11-06 PROCEDURE — C9290 INJ, BUPIVACAINE LIPOSOME: HCPCS | Performed by: ANESTHESIOLOGY

## 2020-11-06 PROCEDURE — 6370000000 HC RX 637 (ALT 250 FOR IP)

## 2020-11-06 PROCEDURE — 82947 ASSAY GLUCOSE BLOOD QUANT: CPT

## 2020-11-06 PROCEDURE — 6360000002 HC RX W HCPCS: Performed by: ANESTHESIOLOGY

## 2020-11-06 PROCEDURE — 7100000010 HC PHASE II RECOVERY - FIRST 15 MIN: Performed by: SURGERY

## 2020-11-06 PROCEDURE — 3600000009 HC SURGERY ROBOT BASE: Performed by: SURGERY

## 2020-11-06 PROCEDURE — 6360000002 HC RX W HCPCS

## 2020-11-06 DEVICE — MESH HERN DIA4.5IN CIR W/ ECHO PS POS SYS VENTRALIGHT ST: Type: IMPLANTABLE DEVICE | Site: ABDOMEN | Status: FUNCTIONAL

## 2020-11-06 RX ORDER — BUPIVACAINE HYDROCHLORIDE 2.5 MG/ML
INJECTION, SOLUTION EPIDURAL; INFILTRATION; INTRACAUDAL
Status: DISCONTINUED
Start: 2020-11-06 | End: 2020-11-06 | Stop reason: HOSPADM

## 2020-11-06 RX ORDER — DEXAMETHASONE SODIUM PHOSPHATE 10 MG/ML
INJECTION, SOLUTION INTRAMUSCULAR; INTRAVENOUS PRN
Status: DISCONTINUED | OUTPATIENT
Start: 2020-11-06 | End: 2020-11-06 | Stop reason: SDUPTHER

## 2020-11-06 RX ORDER — OXYCODONE HYDROCHLORIDE AND ACETAMINOPHEN 5; 325 MG/1; MG/1
1 TABLET ORAL PRN
Status: COMPLETED | OUTPATIENT
Start: 2020-11-06 | End: 2020-11-06

## 2020-11-06 RX ORDER — LABETALOL 20 MG/4 ML (5 MG/ML) INTRAVENOUS SYRINGE
5 EVERY 10 MIN PRN
Status: DISCONTINUED | OUTPATIENT
Start: 2020-11-06 | End: 2020-11-06 | Stop reason: HOSPADM

## 2020-11-06 RX ORDER — LIDOCAINE HYDROCHLORIDE 10 MG/ML
INJECTION, SOLUTION EPIDURAL; INFILTRATION; INTRACAUDAL; PERINEURAL PRN
Status: DISCONTINUED | OUTPATIENT
Start: 2020-11-06 | End: 2020-11-06 | Stop reason: SDUPTHER

## 2020-11-06 RX ORDER — OXYCODONE HYDROCHLORIDE AND ACETAMINOPHEN 5; 325 MG/1; MG/1
2 TABLET ORAL PRN
Status: COMPLETED | OUTPATIENT
Start: 2020-11-06 | End: 2020-11-06

## 2020-11-06 RX ORDER — MIDAZOLAM HYDROCHLORIDE 1 MG/ML
2 INJECTION INTRAMUSCULAR; INTRAVENOUS ONCE
Status: CANCELLED | OUTPATIENT
Start: 2020-11-06 | End: 2020-11-06

## 2020-11-06 RX ORDER — NEOSTIGMINE METHYLSULFATE 5 MG/5 ML
SYRINGE (ML) INTRAVENOUS PRN
Status: DISCONTINUED | OUTPATIENT
Start: 2020-11-06 | End: 2020-11-06 | Stop reason: SDUPTHER

## 2020-11-06 RX ORDER — ONDANSETRON 2 MG/ML
INJECTION INTRAMUSCULAR; INTRAVENOUS PRN
Status: DISCONTINUED | OUTPATIENT
Start: 2020-11-06 | End: 2020-11-06 | Stop reason: SDUPTHER

## 2020-11-06 RX ORDER — GLYCOPYRROLATE 1 MG/5 ML
SYRINGE (ML) INTRAVENOUS PRN
Status: DISCONTINUED | OUTPATIENT
Start: 2020-11-06 | End: 2020-11-06 | Stop reason: SDUPTHER

## 2020-11-06 RX ORDER — 0.9 % SODIUM CHLORIDE 0.9 %
500 INTRAVENOUS SOLUTION INTRAVENOUS
Status: DISCONTINUED | OUTPATIENT
Start: 2020-11-06 | End: 2020-11-06 | Stop reason: HOSPADM

## 2020-11-06 RX ORDER — MEPERIDINE HYDROCHLORIDE 50 MG/ML
12.5 INJECTION INTRAMUSCULAR; INTRAVENOUS; SUBCUTANEOUS EVERY 5 MIN PRN
Status: DISCONTINUED | OUTPATIENT
Start: 2020-11-06 | End: 2020-11-06 | Stop reason: HOSPADM

## 2020-11-06 RX ORDER — OXYCODONE HYDROCHLORIDE AND ACETAMINOPHEN 5; 325 MG/1; MG/1
TABLET ORAL
Status: COMPLETED
Start: 2020-11-06 | End: 2020-11-06

## 2020-11-06 RX ORDER — FENTANYL CITRATE 50 UG/ML
INJECTION, SOLUTION INTRAMUSCULAR; INTRAVENOUS PRN
Status: DISCONTINUED | OUTPATIENT
Start: 2020-11-06 | End: 2020-11-06 | Stop reason: SDUPTHER

## 2020-11-06 RX ORDER — FENTANYL CITRATE 50 UG/ML
INJECTION, SOLUTION INTRAMUSCULAR; INTRAVENOUS
Status: COMPLETED
Start: 2020-11-06 | End: 2020-11-06

## 2020-11-06 RX ORDER — SUCCINYLCHOLINE/SOD CL,ISO/PF 100 MG/5ML
SYRINGE (ML) INTRAVENOUS PRN
Status: DISCONTINUED | OUTPATIENT
Start: 2020-11-06 | End: 2020-11-06 | Stop reason: SDUPTHER

## 2020-11-06 RX ORDER — SODIUM CHLORIDE, SODIUM LACTATE, POTASSIUM CHLORIDE, CALCIUM CHLORIDE 600; 310; 30; 20 MG/100ML; MG/100ML; MG/100ML; MG/100ML
INJECTION, SOLUTION INTRAVENOUS CONTINUOUS PRN
Status: DISCONTINUED | OUTPATIENT
Start: 2020-11-06 | End: 2020-11-06 | Stop reason: SDUPTHER

## 2020-11-06 RX ORDER — MORPHINE SULFATE 2 MG/ML
2 INJECTION, SOLUTION INTRAMUSCULAR; INTRAVENOUS EVERY 5 MIN PRN
Status: DISCONTINUED | OUTPATIENT
Start: 2020-11-06 | End: 2020-11-06 | Stop reason: HOSPADM

## 2020-11-06 RX ORDER — ONDANSETRON 2 MG/ML
4 INJECTION INTRAMUSCULAR; INTRAVENOUS
Status: DISCONTINUED | OUTPATIENT
Start: 2020-11-06 | End: 2020-11-06 | Stop reason: HOSPADM

## 2020-11-06 RX ORDER — FENTANYL CITRATE 50 UG/ML
100 INJECTION, SOLUTION INTRAMUSCULAR; INTRAVENOUS ONCE
Status: CANCELLED | OUTPATIENT
Start: 2020-11-06 | End: 2020-11-06

## 2020-11-06 RX ORDER — OXYCODONE HYDROCHLORIDE AND ACETAMINOPHEN 5; 325 MG/1; MG/1
1 TABLET ORAL EVERY 6 HOURS PRN
Qty: 16 TABLET | Refills: 0 | Status: SHIPPED | OUTPATIENT
Start: 2020-11-06 | End: 2020-11-10

## 2020-11-06 RX ORDER — BUPIVACAINE HYDROCHLORIDE 2.5 MG/ML
INJECTION, SOLUTION EPIDURAL; INFILTRATION; INTRACAUDAL
Status: COMPLETED
Start: 2020-11-06 | End: 2020-11-06

## 2020-11-06 RX ORDER — ROCURONIUM BROMIDE 10 MG/ML
INJECTION, SOLUTION INTRAVENOUS PRN
Status: DISCONTINUED | OUTPATIENT
Start: 2020-11-06 | End: 2020-11-06 | Stop reason: SDUPTHER

## 2020-11-06 RX ORDER — PHENYLEPHRINE HYDROCHLORIDE 10 MG/ML
INJECTION INTRAVENOUS PRN
Status: DISCONTINUED | OUTPATIENT
Start: 2020-11-06 | End: 2020-11-06 | Stop reason: SDUPTHER

## 2020-11-06 RX ORDER — PROPOFOL 10 MG/ML
INJECTION, EMULSION INTRAVENOUS PRN
Status: DISCONTINUED | OUTPATIENT
Start: 2020-11-06 | End: 2020-11-06 | Stop reason: SDUPTHER

## 2020-11-06 RX ORDER — MIDAZOLAM HYDROCHLORIDE 1 MG/ML
INJECTION INTRAMUSCULAR; INTRAVENOUS
Status: COMPLETED
Start: 2020-11-06 | End: 2020-11-06

## 2020-11-06 RX ORDER — BUPIVACAINE HYDROCHLORIDE 2.5 MG/ML
INJECTION, SOLUTION EPIDURAL; INFILTRATION; INTRACAUDAL
Status: COMPLETED | OUTPATIENT
Start: 2020-11-06 | End: 2020-11-06

## 2020-11-06 RX ORDER — MAGNESIUM HYDROXIDE 1200 MG/15ML
LIQUID ORAL CONTINUOUS PRN
Status: COMPLETED | OUTPATIENT
Start: 2020-11-06 | End: 2020-11-06

## 2020-11-06 RX ORDER — PROMETHAZINE HYDROCHLORIDE 25 MG/ML
12.5 INJECTION, SOLUTION INTRAMUSCULAR; INTRAVENOUS
Status: DISCONTINUED | OUTPATIENT
Start: 2020-11-06 | End: 2020-11-06 | Stop reason: HOSPADM

## 2020-11-06 RX ORDER — DIPHENHYDRAMINE HYDROCHLORIDE 50 MG/ML
12.5 INJECTION INTRAMUSCULAR; INTRAVENOUS
Status: DISCONTINUED | OUTPATIENT
Start: 2020-11-06 | End: 2020-11-06 | Stop reason: HOSPADM

## 2020-11-06 RX ADMIN — ROCURONIUM BROMIDE 50 MG: 10 INJECTION INTRAVENOUS at 08:43

## 2020-11-06 RX ADMIN — OXYCODONE HYDROCHLORIDE AND ACETAMINOPHEN 2 TABLET: 5; 325 TABLET ORAL at 10:49

## 2020-11-06 RX ADMIN — PHENYLEPHRINE HYDROCHLORIDE 200 MCG: 10 INJECTION INTRAVENOUS at 09:42

## 2020-11-06 RX ADMIN — Medication 0.5 MG: at 11:18

## 2020-11-06 RX ADMIN — Medication 100 MG: at 08:51

## 2020-11-06 RX ADMIN — Medication 0.4 MG: at 10:12

## 2020-11-06 RX ADMIN — DEXAMETHASONE SODIUM PHOSPHATE 10 MG: 10 INJECTION, SOLUTION INTRAMUSCULAR; INTRAVENOUS at 09:10

## 2020-11-06 RX ADMIN — Medication 2 MG: at 10:09

## 2020-11-06 RX ADMIN — PHENYLEPHRINE HYDROCHLORIDE 200 MCG: 10 INJECTION INTRAVENOUS at 09:37

## 2020-11-06 RX ADMIN — SODIUM CHLORIDE, POTASSIUM CHLORIDE, SODIUM LACTATE AND CALCIUM CHLORIDE: 600; 310; 30; 20 INJECTION, SOLUTION INTRAVENOUS at 09:30

## 2020-11-06 RX ADMIN — PHENYLEPHRINE HYDROCHLORIDE 100 MCG: 10 INJECTION INTRAVENOUS at 09:34

## 2020-11-06 RX ADMIN — LIDOCAINE HYDROCHLORIDE 50 MG: 10 INJECTION, SOLUTION EPIDURAL; INFILTRATION; INTRACAUDAL; PERINEURAL at 08:43

## 2020-11-06 RX ADMIN — SODIUM CHLORIDE, POTASSIUM CHLORIDE, SODIUM LACTATE AND CALCIUM CHLORIDE: 600; 310; 30; 20 INJECTION, SOLUTION INTRAVENOUS at 08:20

## 2020-11-06 RX ADMIN — BUPIVACAINE 20 ML: 13.3 INJECTION, SUSPENSION, LIPOSOMAL INFILTRATION at 08:27

## 2020-11-06 RX ADMIN — SODIUM CHLORIDE, POTASSIUM CHLORIDE, SODIUM LACTATE AND CALCIUM CHLORIDE: 600; 310; 30; 20 INJECTION, SOLUTION INTRAVENOUS at 10:36

## 2020-11-06 RX ADMIN — HYDROMORPHONE HYDROCHLORIDE 0.5 MG: 1 INJECTION, SOLUTION INTRAMUSCULAR; INTRAVENOUS; SUBCUTANEOUS at 11:18

## 2020-11-06 RX ADMIN — BUPIVACAINE HYDROCHLORIDE 60 ML: 2.5 INJECTION, SOLUTION EPIDURAL; INFILTRATION; INTRACAUDAL; PERINEURAL at 08:27

## 2020-11-06 RX ADMIN — ONDANSETRON 4 MG: 2 INJECTION, SOLUTION INTRAMUSCULAR; INTRAVENOUS at 10:04

## 2020-11-06 RX ADMIN — Medication 0.4 MG: at 10:09

## 2020-11-06 RX ADMIN — Medication 1 MG: at 10:14

## 2020-11-06 RX ADMIN — PHENYLEPHRINE HYDROCHLORIDE 200 MCG: 10 INJECTION INTRAVENOUS at 09:59

## 2020-11-06 RX ADMIN — PROPOFOL 100 MG: 10 INJECTION, EMULSION INTRAVENOUS at 08:49

## 2020-11-06 RX ADMIN — SUGAMMADEX 300 MG: 100 INJECTION, SOLUTION INTRAVENOUS at 10:28

## 2020-11-06 RX ADMIN — PHENYLEPHRINE HYDROCHLORIDE 200 MCG: 10 INJECTION INTRAVENOUS at 09:54

## 2020-11-06 RX ADMIN — PHENYLEPHRINE HYDROCHLORIDE 100 MCG: 10 INJECTION INTRAVENOUS at 08:59

## 2020-11-06 RX ADMIN — HYDROMORPHONE HYDROCHLORIDE 0.5 MG: 1 INJECTION, SOLUTION INTRAMUSCULAR; INTRAVENOUS; SUBCUTANEOUS at 10:46

## 2020-11-06 RX ADMIN — PHENYLEPHRINE HYDROCHLORIDE 200 MCG: 10 INJECTION INTRAVENOUS at 09:03

## 2020-11-06 RX ADMIN — MIDAZOLAM HYDROCHLORIDE 2 MG: 1 INJECTION, SOLUTION INTRAMUSCULAR; INTRAVENOUS at 08:20

## 2020-11-06 RX ADMIN — Medication 2 MG: at 10:12

## 2020-11-06 RX ADMIN — PHENYLEPHRINE HYDROCHLORIDE 200 MCG: 10 INJECTION INTRAVENOUS at 09:27

## 2020-11-06 RX ADMIN — Medication 0.5 MG: at 10:46

## 2020-11-06 RX ADMIN — PHENYLEPHRINE HYDROCHLORIDE 200 MCG: 10 INJECTION INTRAVENOUS at 09:22

## 2020-11-06 RX ADMIN — FENTANYL CITRATE 100 MCG: 50 INJECTION, SOLUTION INTRAMUSCULAR; INTRAVENOUS at 08:20

## 2020-11-06 RX ADMIN — FENTANYL CITRATE 100 MCG: 50 INJECTION INTRAMUSCULAR; INTRAVENOUS at 08:43

## 2020-11-06 RX ADMIN — PHENYLEPHRINE HYDROCHLORIDE 200 MCG: 10 INJECTION INTRAVENOUS at 10:14

## 2020-11-06 RX ADMIN — PROPOFOL 200 MG: 10 INJECTION, EMULSION INTRAVENOUS at 08:43

## 2020-11-06 RX ADMIN — CEFAZOLIN 2 G: 10 INJECTION, POWDER, FOR SOLUTION INTRAVENOUS at 08:55

## 2020-11-06 RX ADMIN — PHENYLEPHRINE HYDROCHLORIDE 200 MCG: 10 INJECTION INTRAVENOUS at 09:44

## 2020-11-06 RX ADMIN — PHENYLEPHRINE HYDROCHLORIDE 200 MCG: 10 INJECTION INTRAVENOUS at 09:09

## 2020-11-06 RX ADMIN — PHENYLEPHRINE HYDROCHLORIDE 200 MCG: 10 INJECTION INTRAVENOUS at 09:49

## 2020-11-06 ASSESSMENT — PULMONARY FUNCTION TESTS
PIF_VALUE: 28
PIF_VALUE: 1
PIF_VALUE: 31
PIF_VALUE: 28
PIF_VALUE: 28
PIF_VALUE: 35
PIF_VALUE: 34
PIF_VALUE: 28
PIF_VALUE: 27
PIF_VALUE: 28
PIF_VALUE: 28
PIF_VALUE: 1
PIF_VALUE: 32
PIF_VALUE: 33
PIF_VALUE: 28
PIF_VALUE: 30
PIF_VALUE: 33
PIF_VALUE: 28
PIF_VALUE: 8
PIF_VALUE: 31
PIF_VALUE: 1
PIF_VALUE: 5
PIF_VALUE: 31
PIF_VALUE: 28
PIF_VALUE: 30
PIF_VALUE: 1
PIF_VALUE: 28
PIF_VALUE: 22
PIF_VALUE: 32
PIF_VALUE: 7
PIF_VALUE: 27
PIF_VALUE: 30
PIF_VALUE: 3
PIF_VALUE: 29
PIF_VALUE: 32
PIF_VALUE: 31
PIF_VALUE: 28
PIF_VALUE: 33
PIF_VALUE: 28
PIF_VALUE: 1
PIF_VALUE: 27
PIF_VALUE: 28
PIF_VALUE: 28
PIF_VALUE: 31
PIF_VALUE: 28
PIF_VALUE: 31
PIF_VALUE: 31
PIF_VALUE: 30
PIF_VALUE: 6
PIF_VALUE: 28
PIF_VALUE: 0
PIF_VALUE: 28
PIF_VALUE: 31
PIF_VALUE: 31
PIF_VALUE: 32
PIF_VALUE: 32
PIF_VALUE: 28
PIF_VALUE: 32
PIF_VALUE: 31
PIF_VALUE: 33
PIF_VALUE: 32
PIF_VALUE: 31
PIF_VALUE: 32
PIF_VALUE: 32
PIF_VALUE: 28
PIF_VALUE: 27
PIF_VALUE: 28
PIF_VALUE: 2
PIF_VALUE: 1
PIF_VALUE: 28
PIF_VALUE: 1
PIF_VALUE: 36
PIF_VALUE: 32
PIF_VALUE: 31
PIF_VALUE: 28
PIF_VALUE: 34
PIF_VALUE: 20
PIF_VALUE: 6
PIF_VALUE: 28
PIF_VALUE: 36
PIF_VALUE: 31
PIF_VALUE: 30
PIF_VALUE: 27
PIF_VALUE: 28
PIF_VALUE: 32
PIF_VALUE: 30
PIF_VALUE: 19
PIF_VALUE: 32
PIF_VALUE: 28
PIF_VALUE: 28
PIF_VALUE: 8
PIF_VALUE: 31
PIF_VALUE: 31
PIF_VALUE: 28
PIF_VALUE: 32
PIF_VALUE: 34
PIF_VALUE: 31
PIF_VALUE: 28
PIF_VALUE: 29
PIF_VALUE: 33
PIF_VALUE: 34
PIF_VALUE: 31
PIF_VALUE: 28
PIF_VALUE: 32
PIF_VALUE: 28
PIF_VALUE: 27
PIF_VALUE: 32
PIF_VALUE: 28

## 2020-11-06 ASSESSMENT — PAIN SCALES - GENERAL
PAINLEVEL_OUTOF10: 6
PAINLEVEL_OUTOF10: 8
PAINLEVEL_OUTOF10: 0
PAINLEVEL_OUTOF10: 8
PAINLEVEL_OUTOF10: 7
PAINLEVEL_OUTOF10: 8
PAINLEVEL_OUTOF10: 8

## 2020-11-06 ASSESSMENT — PAIN DESCRIPTION - PAIN TYPE
TYPE: SURGICAL PAIN

## 2020-11-06 ASSESSMENT — PAIN DESCRIPTION - LOCATION
LOCATION: ABDOMEN

## 2020-11-06 ASSESSMENT — PAIN - FUNCTIONAL ASSESSMENT: PAIN_FUNCTIONAL_ASSESSMENT: 0-10

## 2020-11-06 NOTE — ANESTHESIA PRE PROCEDURE
 bupivacaine (PF) (MARCAINE) 0.25 % injection                Allergies:  No Known Allergies    Problem List:    Patient Active Problem List   Diagnosis Code    Essential hypertension I10    Acute pain of right shoulder M25.511    Gastric reflux K21.9    Class 2 severe obesity due to excess calories with serious comorbidity and body mass index (BMI) of 38.0 to 38.9 in adult ([facility] E66.01, Z68.38    Anxiety and depression F41.9, F32.9    Positive colorectal cancer screening using Cologuard test R19.5    Elevated ALT measurement R74.01    Prediabetes R73.03    Elevated hemoglobin (HCC) D58.2    Pure hypercholesterolemia L49.34    Umbilical hernia O34.0    Ventral hernia K43.9    Kidney stones N20.0    Right inguinal hernia K40.90    Trigger finger, right middle finger M65.331    Degenerative joint disease of right acromioclavicular joint M19.011    Tear of right supraspinatus tendon M75.101    Infraspinatus tendinitis, right M75.81    Complete tear of right rotator cuff M75.121    Right bicipital tenosynovitis M75.21    Abnormal prostate exam- calcifications shown on CT ABD/pelvis 7-10-19 R39.89    Alcohol use disorder, mild, abuse F10.10       Past Medical History:        Diagnosis Date    Arthritis     Diabetes mellitus ([facility]     GERD (gastroesophageal reflux disease)     Hyperlipidemia     Hypertension     Kidney stone     Obesity     Positive colorectal cancer screening using Cologuard test 06/2019       Past Surgical History:        Procedure Laterality Date    BICEPS TENDON REPAIR Right 2/6/2020    BICEPS TENDON REPAIR - ARTHROSCOPIC BICEPS TENODESIS performed by Tony Mejia MD at [address] [facility] COLONOSCOPY N/A 7/30/2019    COLORECTAL CANCER SCREENING, NOT HIGH RISK performed by Curtis Gregorio MD at [address] [facility] Right 8/18/2020    CYSTO URETEROSCOPY HOLMIUM LASER W/STENT INSERTION performed by Teodora Grant MD at [address] [facility] LITHOTRIPSY      SHOULDER ARTHROSCOPY Right 2020    ROTATOR CUFF REPAIR WITH ARTHREX, Biceps tendon repair    SHOULDER ARTHROSCOPY Right 2020    SHOULDER ARTHROSCOPY ROTATOR CUFF REPAIR WITH ARTHREX performed by Grady Hansen MD at [address] [place] [address] EXTRACTION         Social History:    Social History     Tobacco Use    Smoking status: Former Smoker     Packs/day: 2.00     Years: 35.00     Pack years: 70.00     Types: Cigarettes     Last attempt to quit:      Years since quittin.8    Smokeless tobacco: Never Used   Substance Use Topics    Alcohol use: Yes     Alcohol/week: 21.0 standard drinks     Types: 21 Shots of liquor per week     Comment: Vodka & Tonic Q.O.D. Counseling given: Not Answered      Vital Signs (Current):   Vitals:    20 0632 20 0642   BP:  (!) 133/95   Pulse:  110   Resp:  20   Temp:  96.7 °F (35.9 °C)   TempSrc:  Temporal   SpO2:  96%   Weight: 278 lb 3.2 oz (126.2 kg)    Height: 5' 11\" (1.803 m)                                               BP Readings from Last 3 Encounters:   20 (!) 133/95   20 132/82   10/15/20 131/82       NPO Status: Time of last liquid consumption: 600                        Time of last solid consumption:                         Date of last liquid consumption: 20                        Date of last solid food consumption: 20    BMI:   Wt Readings from Last 3 Encounters:   20 278 lb 3.2 oz (126.2 kg)   20 277 lb (125.6 kg)   10/15/20 277 lb (125.6 kg)     Body mass index is 38.8 kg/m².     CBC:   Lab Results   Component Value Date    WBC 11.2 2020    RBC 4.84 2020    HGB 15.8 2020    HCT 45.8 2020    MCV 94.5 2020    RDW 13.0 2020     2020       CMP:   Lab Results   Component Value Date     2020    K 3.6 2020    CL 98 2020    CO2 24 2020    BUN 17 2020    CREATININE 1.15 08/21/2020    GFRAA >60 08/21/2020    LABGLOM >60 08/21/2020    GLUCOSE 163 08/21/2020    PROT 6.9 08/21/2020    CALCIUM 9.9 08/21/2020    BILITOT 0.50 08/21/2020    ALKPHOS 79 08/21/2020    AST 23 08/21/2020    ALT 33 08/21/2020       POC Tests:   Recent Labs     11/06/20  0654   POCGLU 119*       Coags: No results found for: PROTIME, INR, APTT    HCG (If Applicable): No results found for: PREGTESTUR, PREGSERUM, HCG, HCGQUANT     ABGs: No results found for: PHART, PO2ART, SPF2MFM, NGH1PYJ, BEART, I0GCEADH     Type & Screen (If Applicable):  No results found for: LABABO, LABRH    Drug/Infectious Status (If Applicable):  No results found for: HIV, HEPCAB    COVID-19 Screening (If Applicable):   Lab Results   Component Value Date    COVID19 Not Detected 11/02/2020         Anesthesia Evaluation  Patient summary reviewed and Nursing notes reviewed  Airway: Mallampati: IV  TM distance: >3 FB   Neck ROM: full  Mouth opening: > = 3 FB Dental:      Comment: -MISSING SOME LOWER TEETH BILATERALLY    Pulmonary:normal exam    (+) COPD:                            ROS comment: -QUIT SMOKING 2013 - 79 PACK YEARS  -PRODUCTIVE COUGH   Cardiovascular:    (+) hypertension:,                   Neuro/Psych:   Negative Neuro/Psych ROS              GI/Hepatic/Renal:   (+) GERD:, morbid obesity          Endo/Other:    (+) Diabetes, : arthritis:., .                  ROS comment: -NPO AFTER MIDNIGHT   -NKDA Abdominal:           Vascular: negative vascular ROS. Anesthesia Plan      general and regional     ASA 3     (GETA, TAP BLOCK)  Induction: intravenous. MIPS: Postoperative opioids intended and Prophylactic antiemetics administered. Anesthetic plan and risks discussed with patient. Plan discussed with CRNA.     Attending anesthesiologist reviewed and agrees with Pre Eval content              Sylvester Muir MD   11/6/2020

## 2020-11-06 NOTE — BRIEF OP NOTE
Brief Postoperative Note      Patient: Yazmin Bui  YOB: 1965  MRN: [id number]    Date of Procedure: 11/6/2020    Pre-Op Diagnosis: VENTRAL HERNIA    Post-Op Diagnosis: same, multiple       Procedure(s): HERNIA VENTRAL REPAIR LAPAROSCOPIC ROBOTIC    Surgeon(s):  Nita Sarkar MD    Assistant:  * No surgical staff found *    Anesthesia: General    Estimated Blood Loss (mL): Minimal    Complications: None    Specimens:   * No specimens in log *    Implants:  Implant Name Type Inv. Item Serial No.  Lot No. LRB No. Used Action   MESH BRENDA DIA4. 5IN CIR W/ ECHO PS POS SYS VENTRALIGHT ST  MESH BRENDA DIA4. 5IN CIR W/ ECHO PS POS SYS VENTRALIGHT ST  BARD Located within [facility] [id number] N/A 1 Implanted         Drains:   [REMOVED] NG/OG/NJ/NE Tube Orogastric 18 fr Center mouth (Removed)       Findings:     Electronically signed by Nita Sarkar MD on 11/6/2020 at 10:23 AM

## 2020-11-06 NOTE — ANESTHESIA PROCEDURE NOTES
BILATERAL FOUR POINT TAP BLOCK    Patient location during procedure: pre-op  Start time: 11/6/2020 8:22 AM  End time: 11/6/2020 8:27 AM  Staffing  Anesthesiologist: Bonnie Tucker MD  Performed: anesthesiologist   Preanesthetic Checklist  Completed: patient identified, site marked, surgical consent, pre-op evaluation, timeout performed, IV checked, risks and benefits discussed, monitors and equipment checked, anesthesia consent given, oxygen available and patient being monitored  Peripheral Block  Patient position: supine  Prep: ChloraPrep  Patient monitoring: cardiac monitor, continuous pulse ox and frequent blood pressure checks  Block type: TAP  Laterality: bilateral  Injection technique: single-shot  Procedures: ultrasound guided  Provider prep: mask and sterile gloves  Needle  Needle type: combined needle/nerve stimulator   Needle gauge: 20 G  Needle length: 4 IN.   Needle localization: anatomical landmarks and ultrasound guidance  Assessment  Injection assessment: negative aspiration for heme, no paresthesia on injection and local visualized surrounding nerve on ultrasound  Paresthesia pain: none  Slow fractionated injection: yes  Hemodynamics: stable  Medications Administered  Bupivacaine liposome (EXPAREL) injection 1.3%, 20 mL  bupivacaine (MARCAINE) PF injection 0.25%, 60 mL  Reason for block: post-op pain management

## 2020-11-06 NOTE — PROGRESS NOTES
CLINICAL PHARMACY NOTE: MEDS TO [address] Select Patient?: No  Total # of Prescriptions Filled: 1   The following medications were delivered to the patient:  · Oxycodone/APAP 5-325 mg  Total # of Interventions Completed: 0  Time Spent (min): 5    Additional Documentation:  DELIVERED TO PT $0 COPAY

## 2020-11-06 NOTE — ANESTHESIA POSTPROCEDURE EVALUATION
Department of Anesthesiology  Postprocedure Note    Patient: Emilia Borrero  MRN: [id number]  YOB: 1965  Date of evaluation: 11/6/2020  Time:  11:56 AM     Procedure Summary     Date:  11/06/20 Room / Location:  [facility] 05 / [facility]    Anesthesia Start:  [address] [facility] Anesthesia Stop:  4203    Procedure: HERNIA VENTRAL REPAIR LAPAROSCOPIC ROBOTIC (N/A ) Diagnosis:  (VENTRAL HERNIA)    Surgeon: Sandy Wilson MD Responsible Provider:  ART Cosby CRNA    Anesthesia Type:  general, regional ASA Status:  3          Anesthesia Type: general, regional    Shalini Phase I: Shalini Score: 7    Shalini Phase II: Shalini Score: 9    Last vitals: Reviewed and per EMR flowsheets.        Anesthesia Post Evaluation    Patient location during evaluation: PACU  Patient participation: complete - patient participated  Level of consciousness: awake and alert  Airway patency: patent  Nausea & Vomiting: no nausea and no vomiting  Complications: no  Cardiovascular status: hemodynamically stable  Respiratory status: face mask and spontaneous ventilation  Hydration status: euvolemic

## 2020-11-11 NOTE — OP NOTE
[address]                  [facility]                                OPERATIVE REPORT    PATIENT NAME: Alma Delia De                 :        1965  MED REC NO:   [id number]                             ROOM:  ACCOUNT NO:   [de-identified]                           ADMIT DATE: 2020  PROVIDER:     Tricia Mederos    DATE OF PROCEDURE:  2020    PREOPERATIVE DIAGNOSIS:  Ventral hernia. POSTOPERATIVE DIAGNOSIS:  Ventral hernia, multiple. PROCEDURE:  Laparoscopic robot-assisted repair of ventral hernias with  mesh. SURGEON:  Tricia Mederos MD    ANESTHESIA:  General endotracheal.    EBL:  Minimal.    SPECIMENS:  None. CLINICAL INDICATIONS:  This patient is a 71-year-old male who was  recently evaluated for a symptomatic ventral hernia. Prior CT had  showed more than one fascial defect consistent with multiple  periumbilical ventral hernias. Based on these findings, laparoscopic  robot-assisted repair of the hernias was recommended. The risks,  benefits, options, and potential complications of the procedure were  discussed in detail with him and he agreed to proceed, and consent was  signed. DETAILS OF PROCEDURE:  The patient was brought to the operating room and  placed in the supine position. General anesthesia was induced, and the  patient was intubated. The abdomen was prepped and draped in the usual  sterile fashion. 2 gm Ancef were given IV. The patient had previously  undergone regional block per Anesthesia. A small incision was then made  in the left upper quadrant. A 12-mm port was placed through this  incision and advanced into the peritoneal cavity under laparoscopic  guidance. The abdomen was then insufflated to 15 mmHg of carbon  dioxide. A camera was introduced and video laparoscopy performed. Safe  port site entry was confirmed.   A left lateral as well as a left lower  quadrant 8-mm robotic ports were then placed under direct visualization. An additional robotic port was floated through the 12-mm port. At this  point, the [employer] was brought into the field. All ports were  docked and instruments introduced in a standard fashion. At this point,  I left the operating table and attended the surgeon console. Upon  initial inspection, there was a small segment of omentum contained  within one of the hernia sacs. This was easily released with traction. Multiple small fascial defects, approximately 4 or 5, were noted all in  the periumbilical region. The abdominal pressure was then decreased to  8 mmHg of carbon dioxide. The fascial defects were then all closed with  running sutures of 0 V-Loc. At this point, a 4-1/2-inch diameter  Ventralight ST mesh was brought into the field and placed in saline. This was rolled and placed into the peritoneal cavity. Through a small  incision, the suture grasper was introduced into the peritoneal cavity  and the ring portion of the mesh grasped and brought out externally,  pulling the mesh to the anterior abdominal wall. The balloon of the  mesh was then inflated, flattening the mesh nicely. The mesh was then  secured to the anterior abdominal wall with two running sutures of 2-0  V-Loc sutures. The balloon was then deflated and removed from the mesh. Secure mesh placement with excellent coverage of the previously closed  hernia defects was noted as well as hemostasis. Needles as well as  balloon were removed from the peritoneal cavity. No other abnormalities  were identified. At this point, the [employer] was undocked, and the  instruments were removed. I returned to the operating table at this  point. The abdomen was desufflated. Skin incisions were closed with  subcuticular sutures of 4-0 Vicryl and Dermabond. Sterile dressings  were applied. Sponge, needle, and instrument counts were correct at the  end of the procedure.   The patient tolerated the procedure well and was  transferred to the recovery area in stable condition.         HALLIE Prieto    D: 11/10/2020 13:43:24       T: 11/10/2020 21:56:34     [email or web address]  Job#: [id number]     Doc#: [id number]    CC:

## 2020-11-12 ENCOUNTER — OFFICE VISIT (OUTPATIENT)
Dept: SURGERY | Age: 55
End: 2020-11-12

## 2020-11-12 VITALS — WEIGHT: 275 LBS | BODY MASS INDEX: 38.5 KG/M2 | HEIGHT: 71 IN

## 2020-11-12 PROCEDURE — 99024 POSTOP FOLLOW-UP VISIT: CPT | Performed by: SURGERY

## 2020-11-12 NOTE — PROGRESS NOTES
[address] [place] [address] Everardo Socrates., SALIMA. [address]   48 y.o.  1965  [id number]     Author: Seferino Holly MD  Visit Date:  11/12/2020      Subjective:    Mandie Velazquez is seen 6 days status post robotic repair of multiple small ventral hernias. He still has some moderate discomfort as expected although he is gradually resuming his activities. Objective:  Ht 5' 11\" (1.803 m)   Wt 275 lb (124.7 kg)   BMI 38.35 kg/m²      Abdomen is soft. Incisions are healing nicely. There are no signs of infection or hernia recurrence. Assessment:  Patient is progressing as expected following ventral hernia repair. Plan: At this time he may gradually increase his activities as tolerated and I will see him on an as-needed basis.       Electronically signed by Susana Sheffield MD on 11/12/20 at 1:41 PM EST

## 2020-12-29 RX ORDER — OMEPRAZOLE 20 MG/1
20 CAPSULE, DELAYED RELEASE ORAL DAILY
Qty: 90 CAPSULE | Refills: 0 | Status: SHIPPED | OUTPATIENT
Start: 2020-12-29 | End: 2021-01-11 | Stop reason: SDUPTHER

## 2021-01-11 DIAGNOSIS — R73.03 PREDIABETES: ICD-10-CM

## 2021-01-11 DIAGNOSIS — I10 ESSENTIAL HYPERTENSION: ICD-10-CM

## 2021-01-11 DIAGNOSIS — D58.2 ELEVATED HEMOGLOBIN (HCC): ICD-10-CM

## 2021-01-11 DIAGNOSIS — K21.9 GASTRIC REFLUX: ICD-10-CM

## 2021-01-11 RX ORDER — ASPIRIN 81 MG/1
81 TABLET ORAL DAILY
Qty: 90 TABLET | Refills: 0 | Status: SHIPPED | OUTPATIENT
Start: 2021-01-11 | End: 2021-01-12 | Stop reason: SDUPTHER

## 2021-01-11 RX ORDER — OMEPRAZOLE 20 MG/1
20 CAPSULE, DELAYED RELEASE ORAL DAILY
Qty: 90 CAPSULE | Refills: 0 | Status: SHIPPED | OUTPATIENT
Start: 2021-01-11 | End: 2021-01-12 | Stop reason: SDUPTHER

## 2021-01-11 RX ORDER — METOPROLOL TARTRATE 50 MG/1
50 TABLET, FILM COATED ORAL DAILY
Qty: 90 TABLET | Refills: 0 | Status: SHIPPED | OUTPATIENT
Start: 2021-01-11 | End: 2021-01-12 | Stop reason: SDUPTHER

## 2021-01-11 NOTE — TELEPHONE ENCOUNTER
Last appointment 03/20/20, has new patient appointment scheduled for tomorrow with Beverly Restrepo CNP.

## 2021-01-12 ENCOUNTER — TELEPHONE (OUTPATIENT)
Dept: SURGERY | Age: 56
End: 2021-01-12

## 2021-01-12 ENCOUNTER — OFFICE VISIT (OUTPATIENT)
Dept: FAMILY MEDICINE CLINIC | Age: 56
End: 2021-01-12
Payer: COMMERCIAL

## 2021-01-12 ENCOUNTER — OFFICE VISIT (OUTPATIENT)
Dept: SURGERY | Age: 56
End: 2021-01-12

## 2021-01-12 VITALS
BODY MASS INDEX: 39.61 KG/M2 | DIASTOLIC BLOOD PRESSURE: 94 MMHG | WEIGHT: 284 LBS | OXYGEN SATURATION: 96 % | RESPIRATION RATE: 16 BRPM | HEART RATE: 96 BPM | TEMPERATURE: 97 F | SYSTOLIC BLOOD PRESSURE: 166 MMHG

## 2021-01-12 VITALS
WEIGHT: 283 LBS | HEART RATE: 81 BPM | DIASTOLIC BLOOD PRESSURE: 103 MMHG | HEIGHT: 71 IN | OXYGEN SATURATION: 95 % | BODY MASS INDEX: 39.62 KG/M2 | SYSTOLIC BLOOD PRESSURE: 157 MMHG

## 2021-01-12 DIAGNOSIS — D58.2 ELEVATED HEMOGLOBIN (HCC): ICD-10-CM

## 2021-01-12 DIAGNOSIS — K21.9 GASTRIC REFLUX: ICD-10-CM

## 2021-01-12 DIAGNOSIS — K42.9 UMBILICAL HERNIA WITHOUT OBSTRUCTION AND WITHOUT GANGRENE: ICD-10-CM

## 2021-01-12 DIAGNOSIS — F10.10 ALCOHOL USE DISORDER, MILD, ABUSE: ICD-10-CM

## 2021-01-12 DIAGNOSIS — Z00.00 PREVENTATIVE HEALTH CARE: ICD-10-CM

## 2021-01-12 DIAGNOSIS — F41.9 ANXIETY AND DEPRESSION: ICD-10-CM

## 2021-01-12 DIAGNOSIS — E66.01 CLASS 2 SEVERE OBESITY DUE TO EXCESS CALORIES WITH SERIOUS COMORBIDITY AND BODY MASS INDEX (BMI) OF 38.0 TO 38.9 IN ADULT (HCC): ICD-10-CM

## 2021-01-12 DIAGNOSIS — E11.9 TYPE 2 DIABETES MELLITUS WITHOUT COMPLICATION, WITHOUT LONG-TERM CURRENT USE OF INSULIN (HCC): ICD-10-CM

## 2021-01-12 DIAGNOSIS — E78.00 PURE HYPERCHOLESTEROLEMIA: ICD-10-CM

## 2021-01-12 DIAGNOSIS — M54.31 RIGHT SIDED SCIATICA: ICD-10-CM

## 2021-01-12 DIAGNOSIS — R22.2 ABDOMINAL WALL MASS: Primary | ICD-10-CM

## 2021-01-12 DIAGNOSIS — R74.01 ELEVATED ALT MEASUREMENT: ICD-10-CM

## 2021-01-12 DIAGNOSIS — I10 ESSENTIAL HYPERTENSION: Primary | ICD-10-CM

## 2021-01-12 DIAGNOSIS — F32.A ANXIETY AND DEPRESSION: ICD-10-CM

## 2021-01-12 PROBLEM — R73.03 PREDIABETES: Status: RESOLVED | Noted: 2019-06-28 | Resolved: 2021-01-12

## 2021-01-12 PROBLEM — Z87.891 FORMER SMOKER: Status: ACTIVE | Noted: 2017-02-15

## 2021-01-12 LAB — HBA1C MFR BLD: 7 %

## 2021-01-12 PROCEDURE — 3051F HG A1C>EQUAL 7.0%<8.0%: CPT | Performed by: NURSE PRACTITIONER

## 2021-01-12 PROCEDURE — 99214 OFFICE O/P EST MOD 30 MIN: CPT | Performed by: NURSE PRACTITIONER

## 2021-01-12 PROCEDURE — 83036 HEMOGLOBIN GLYCOSYLATED A1C: CPT | Performed by: NURSE PRACTITIONER

## 2021-01-12 PROCEDURE — 99024 POSTOP FOLLOW-UP VISIT: CPT | Performed by: SURGERY

## 2021-01-12 RX ORDER — ATORVASTATIN CALCIUM 10 MG/1
10 TABLET, FILM COATED ORAL NIGHTLY
Qty: 90 TABLET | Refills: 1 | Status: SHIPPED | OUTPATIENT
Start: 2021-01-12 | End: 2021-04-20 | Stop reason: SDUPTHER

## 2021-01-12 RX ORDER — LOSARTAN POTASSIUM 100 MG/1
100 TABLET ORAL DAILY
Qty: 90 TABLET | Refills: 0 | Status: SHIPPED | OUTPATIENT
Start: 2021-01-12 | End: 2021-04-19

## 2021-01-12 RX ORDER — METOPROLOL TARTRATE 50 MG/1
50 TABLET, FILM COATED ORAL DAILY
Qty: 90 TABLET | Refills: 0 | Status: SHIPPED | OUTPATIENT
Start: 2021-01-12 | End: 2021-04-20 | Stop reason: SDUPTHER

## 2021-01-12 RX ORDER — METFORMIN HYDROCHLORIDE 500 MG/1
1000 TABLET, EXTENDED RELEASE ORAL
Qty: 60 TABLET | Refills: 0 | Status: SHIPPED | OUTPATIENT
Start: 2021-01-12 | End: 2021-04-19

## 2021-01-12 RX ORDER — ASPIRIN 81 MG/1
81 TABLET ORAL DAILY
Qty: 90 TABLET | Refills: 0 | Status: SHIPPED | OUTPATIENT
Start: 2021-01-12 | End: 2021-04-20 | Stop reason: SDUPTHER

## 2021-01-12 RX ORDER — HYDROCHLOROTHIAZIDE 25 MG/1
25 TABLET ORAL DAILY
Qty: 90 TABLET | Refills: 1 | Status: SHIPPED | OUTPATIENT
Start: 2021-01-12 | End: 2021-04-20 | Stop reason: SDUPTHER

## 2021-01-12 RX ORDER — OMEPRAZOLE 20 MG/1
20 CAPSULE, DELAYED RELEASE ORAL DAILY
Qty: 90 CAPSULE | Refills: 0 | Status: SHIPPED | OUTPATIENT
Start: 2021-01-12 | End: 2021-04-20 | Stop reason: SDUPTHER

## 2021-01-12 RX ORDER — FLUTICASONE PROPIONATE 50 MCG
1 SPRAY, SUSPENSION (ML) NASAL DAILY
Qty: 2 BOTTLE | Refills: 1 | Status: SHIPPED | OUTPATIENT
Start: 2021-01-12 | End: 2021-12-07 | Stop reason: SDUPTHER

## 2021-01-12 RX ORDER — PREDNISONE 20 MG/1
TABLET ORAL
Qty: 17 TABLET | Refills: 0 | Status: SHIPPED | OUTPATIENT
Start: 2021-01-12 | End: 2021-01-22

## 2021-01-12 ASSESSMENT — ENCOUNTER SYMPTOMS
SHORTNESS OF BREATH: 0
COUGH: 0
DIARRHEA: 0
CHEST TIGHTNESS: 0
VOMITING: 0
SORE THROAT: 0
NAUSEA: 0
ABDOMINAL DISTENTION: 0
ABDOMINAL PAIN: 0
RHINORRHEA: 0
CONSTIPATION: 0
BACK PAIN: 1
COLOR CHANGE: 0

## 2021-01-12 NOTE — PROGRESS NOTES
Gina Funes, ART-CNP  [facility]  [address] [address]  [address] [address]  Dept: [phone]  Dept Fax: [phone]       Patient ID: Isi Jasmine is a 48 y.o. male. HPI    Isi Jasmine is a 48 y.o. male who presents to the office today to establish care with provider, he was a patient of Lucy Medina CNP who has left the practice. Today, the patient complains of right sciatic nerve pain started about 3 weeks ago, he was making trips down to the keys and has been taking stuff back and forth because they bought a place there, it is causing him to have trouble walking, he does notice numbness and tingling down the leg,  He does state it gets better when he rotates upside down. He did take tylenol and ibuprofen and it did help some. Feels like a tennis ball is in there. Notices he has a lump where he had a hernia repair done by Dr. Ana Nieto. He denies pain, N/V/C. He states if he messes with it then it will get tender but otherwise there is no pain. It is hard and states it feels like a marble. Right rotator cuff tear- repaired on 2/6/2020 with Dr. Ajay Perry. He is not having any pain but he does feel like the cuff does get stuck and he feels like he has to wind his arm up to get it to reach up. Alcohol abuse - drinking about 12 beers every day to every other day. He has a new contract and so he doesn't have to get up and do as much physically. Now that he has more time on his hands he tends to kick back and enjoy his beer. He seen Zuri [place] once and didn't feel like it was needed/ . HTN- normally contrlled on metoprolol 50 mg, losartan 100 mg, HCTZ 25 mg.  He has been off his medication for about a week and a half which is why it is elevated he ran out of all his medication    Anxiety/ depression- zoloft stable when taking    Pre-diabetes- metformin twice a day, hasn't taken in a few weeks    hypercholesteremia - controlled with Lipitor, but hasn't taken in a few weeks. Pt denies any N/V/D/C or abdominal pain. Pt denies any fever or chills. Pt today denies any HA, chest pain, or SOB. The patient's past medical, surgical, social, and family history as well as his current medications and allergies were reviewed as documented in today's encounter TRENT Gold. Current Outpatient Medications on File Prior to Visit   Medication Sig Dispense Refill    metoprolol tartrate (LOPRESSOR) 50 MG tablet Take 1 tablet by mouth daily 90 tablet 0    aspirin EC 81 MG EC tablet Take 1 tablet by mouth daily 90 tablet 0    omeprazole (PRILOSEC) 20 MG delayed release capsule Take 1 capsule by mouth Daily 90 capsule 0    ondansetron (ZOFRAN) 4 MG tablet Take 1 tablet by mouth every 8 hours as needed for Nausea 20 tablet 0    atorvastatin (LIPITOR) 10 MG tablet Take 1 tablet by mouth nightly 90 tablet 1    losartan (COZAAR) 100 MG tablet Take 1 tablet by mouth daily 90 tablet 1    hydroCHLOROthiazide (HYDRODIURIL) 25 MG tablet Take 1 tablet by mouth daily 90 tablet 1    sertraline (ZOLOFT) 50 MG tablet Take 1.5 tablets by mouth daily 135 tablet 1    metFORMIN (GLUCOPHAGE) 500 MG tablet Take 1 tablet by mouth 2 times daily (with meals) 180 tablet 1     No current facility-administered medications on file prior to visit. Subjective:     Preventative care, male:  Last colonoscopy: UTD 7/30/2019  Nicotine use: no, quit 10 years ago   Alcohol use: yes see above  Drug use: no  PSA: 1/17/2020 sees urology  Dental exam: 2 months ago  Eye exam: within the last year or so. Has glasses     Specialists: none    Review of Systems   Constitutional: Negative for activity change, chills, fatigue and fever. HENT: Positive for postnasal drip (causes cough in am). Negative for congestion, ear pain, rhinorrhea and sore throat. Respiratory: Negative for cough, chest tightness and shortness of breath.     Cardiovascular: Negative for chest pain, palpitations and leg swelling. Gastrointestinal: Negative for abdominal distention, abdominal pain, constipation, diarrhea, nausea and vomiting. Lump under skin, feels like marble   Endocrine: Negative for polydipsia, polyphagia and polyuria. Genitourinary: Negative for difficulty urinating and dysuria. Musculoskeletal: Positive for back pain (right sciatica). Negative for arthralgias and myalgias. Skin: Negative for color change and rash. Neurological: Positive for weakness (right leg when pain is bad). Negative for dizziness, light-headedness and headaches. Hematological: Negative for adenopathy. Psychiatric/Behavioral: Negative for agitation and behavioral problems. The patient is not nervous/anxious. Vitals:    01/12/21 0854   BP: (!) 164/98   Pulse: 96   Resp: 16   Temp: 97 °F (36.1 °C)   SpO2: 96%     Objective:     Physical Exam  Vitals signs reviewed. Constitutional:       General: He is not in acute distress. Appearance: Normal appearance. HENT:      Head: Normocephalic and atraumatic. Right Ear: External ear normal.      Left Ear: External ear normal.      Nose: Nose normal.      Mouth/Throat:      Mouth: Mucous membranes are moist.      Pharynx: No oropharyngeal exudate or posterior oropharyngeal erythema. Eyes:      Extraocular Movements: Extraocular movements intact. Conjunctiva/sclera: Conjunctivae normal.      Pupils: Pupils are equal, round, and reactive to light. Neck:      Musculoskeletal: Normal range of motion. Cardiovascular:      Rate and Rhythm: Normal rate and regular rhythm. Pulses: Normal pulses. Heart sounds: Normal heart sounds. No murmur. Pulmonary:      Effort: Pulmonary effort is normal. No respiratory distress. Breath sounds: Normal breath sounds. No wheezing or rales. Abdominal:      General: Bowel sounds are normal. There is no distension. Palpations: Abdomen is soft. Tenderness:  There is no abdominal tenderness. There is no guarding or rebound. Hernia: A hernia (? hernia vs mass from prior surgery) is present. Musculoskeletal: Normal range of motion. Right lower leg: No edema. Left lower leg: No edema. Lymphadenopathy:      Cervical: No cervical adenopathy. Skin:     General: Skin is warm and dry. Neurological:      General: No focal deficit present. Mental Status: He is alert and oriented to person, place, and time. Deep Tendon Reflexes: Reflexes normal.   Psychiatric:         Mood and Affect: Mood normal.         Behavior: Behavior normal. Behavior is cooperative. Assessment:      Diagnosis Orders   1. Essential hypertension  aspirin EC 81 MG EC tablet    hydroCHLOROthiazide (HYDRODIURIL) 25 MG tablet    losartan (COZAAR) 100 MG tablet    metoprolol tartrate (LOPRESSOR) 50 MG tablet   2. Class 2 severe obesity due to excess calories with serious comorbidity and body mass index (BMI) of 38.0 to 38.9 in adult ([facility] [address]     3. Anxiety and depression  sertraline (ZOLOFT) 50 MG tablet   4. Elevated ALT measurement  Comprehensive Metabolic Panel   5. Pure hypercholesterolemia  CBC    Lipid Panel    atorvastatin (LIPITOR) 10 MG tablet   6. Alcohol use disorder, mild, abuse  CBC    Comprehensive Metabolic Panel    sertraline (ZOLOFT) 50 MG tablet   7. Elevated hemoglobin (HCC)  aspirin EC 81 MG EC tablet   8. Gastric reflux  omeprazole (PRILOSEC) 20 MG delayed release capsule   9. Umbilical hernia without obstruction and without gangrene  Irena Jha DO, [facility]   10. Preventative health care  Psa screening    Hepatitis C Antibody   11. Right sided sciatica  predniSONE (DELTASONE) 20 MG tablet   12.  Type 2 diabetes mellitus without complication, without long-term current use of insulin (HCC)  CBC    POCT glycosylated hemoglobin (Hb A1C)    metFORMIN (GLUCOPHAGE-XR) 500 MG extended release tablet       Plan:     - restart lopressor,

## 2021-01-12 NOTE — LETTER
[address]  Phone: [phone]  Fax: [phone]      21    Patient: Delia Montero  MRN: [id number]  : 1965  Date of visit: 2021    Dear Fazal Travis, ART - CNP:      I saw Delia Montero in evaluation for a subcutaneous nodule of the abdominal wall new since Neymar's recent ventral hernia repair. Below are the relevant portions of my assessment and plan of care. If you have questions, please do not hesitate to call me. I look forward to following Delia Montero along with you.     Sincerely,        Byron Dorsey, DO

## 2021-01-12 NOTE — PROGRESS NOTES
[facility] General Surgery [facility]  Progress Note    PATIENT NAME: Ayaka Viveros     CLINIC VISIT DATE: 1/12/2021    SUBJECTIVE:  Ayaka Viveros is a 48 y.o. male who presents to the clinic today for evaluation for abd wall mass, pt reports this has been present in his recovery from robot assist ventral hernia repair performed by Dr. Myranda Recio 11/2020, pt denies pain or limitations in activity but wanted to have this evaluated. Denies history of obstruction or any overlying skin changes, no other complaints. OBJECTIVE:    BP (!) 157/103 (Site: Left Upper Arm, Position: Sitting, Cuff Size: Large Adult)   Pulse 81   Ht 5' 11\" (1.803 m)   Wt 283 lb (128.4 kg)   SpO2 95%   BMI 39.47 kg/m²     General Appearance:  awake, alert, no acute distress, well developed, well nourished   Skin:  Skin color, texture, turgor normal. No rashes or lesions. Lungs:  Normal chest expansion, unlabored breathing without accessory muscle use. No audible rales, rhonchi, or wheezing. Cardiovascular: S1S2. No evidence of JVD. No evidence of pulsatile masses in abdomen  Abdomen:  Soft, non-tender, no organomegaly, no masses. Incisions well healed. There is a firm subcutaneous nodule ~2cm in diameter superior and right lateral to the umbilicus, nontender. Musculoskeletal: No evidence of bony/muscular deformities, trauma, atrophy of either left/right upper/lower extremity. No evidence of digital clubbing or cyanosis. ASSESSMENT:  1. Abdominal wall mass          PLAN:  1. This mass may represent tissue that was caught in the suture used to secure the hernia mesh in place, less likely recurrence of a ventral hernia. Will obtain soft tissue ultrasound to rule out hernia, clinic will follow up with results. I did advise the patient to return to the ED if this becomes drastically larger or more painful in which case emergency CT can be obtained. All questions were answered, pt is agreeable to this plan.   2. Electronically signed by Mikhail Tapia DO on 1/12/2021 at 10:44 AM

## 2021-01-12 NOTE — TELEPHONE ENCOUNTER
I called and left  for pt. I scheduled his ultrasound for 1/15/2021. He is to fast for 8 hours prior to [address] [facility] [facility] and arrive to his appt 15 min before his scheduled time.  If he has any questions, he is to call the office at [phone]

## 2021-01-12 NOTE — PROGRESS NOTES
Visit Information    Have you changed or started any medications since your last visit including any over-the-counter medicines, vitamins, or herbal medicines? no   Have you stopped taking any of your medications? Is so, why? -  no  Are you having any side effects from any of your medications? - no    Have you seen any other physician or provider since your last visit?  no   Have you had any other diagnostic tests since your last visit?  no   Have you been seen in the emergency room and/or had an admission in a hospital since we last saw you?  no   Have you had your routine dental cleaning in the past 6 months?  no     Do you have an active MyChart account? If no, what is the barrier?   Yes    Patient Care Team:  ART George CNP as PCP - General (Certified Nurse Practitioner)  ART Gregorio CNP as PCP - [facility]  Eugenio Louise MD as Consulting Physician (Urology)    Medical History Review  Past Medical, Family, and Social History reviewed and does contribute to the patient presenting condition    Health Maintenance   Topic Date Due    Hepatitis C screen  1965    DTaP/Tdap/Td vaccine (1 - Tdap) 02/21/1985    Shingles Vaccine (1 of 2) 02/21/2016    Flu vaccine (1) 09/01/2020    A1C test (Diabetic or Prediabetic)  01/17/2021    Lipid screen  01/17/2021    Pneumococcal 0-64 years Vaccine (1 of 1 - PPSV23) 03/06/2021 (Originally 2/21/1972)    HIV screen  06/06/2029 (Originally 2/21/1981)    Potassium monitoring  08/21/2021    Creatinine monitoring  08/21/2021    Colon cancer screen colonoscopy  07/30/2029    Hepatitis A vaccine  Aged Out    Hepatitis B vaccine  Aged Out    Hib vaccine  Aged Out    Meningococcal (ACWY) vaccine  Aged Out

## 2021-01-15 ENCOUNTER — HOSPITAL ENCOUNTER (OUTPATIENT)
Dept: ULTRASOUND IMAGING | Age: 56
Discharge: HOME | End: 2021-01-17
Payer: COMMERCIAL

## 2021-01-15 DIAGNOSIS — R22.2 ABDOMINAL WALL MASS: ICD-10-CM

## 2021-01-15 PROCEDURE — 76705 ECHO EXAM OF ABDOMEN: CPT

## 2021-01-19 ENCOUNTER — PATIENT MESSAGE (OUTPATIENT)
Dept: FAMILY MEDICINE CLINIC | Age: 56
End: 2021-01-19

## 2021-01-19 DIAGNOSIS — F32.A ANXIETY AND DEPRESSION: Primary | ICD-10-CM

## 2021-01-19 DIAGNOSIS — M54.31 RIGHT SIDED SCIATICA: ICD-10-CM

## 2021-01-19 DIAGNOSIS — F41.9 ANXIETY AND DEPRESSION: Primary | ICD-10-CM

## 2021-01-19 NOTE — TELEPHONE ENCOUNTER
From: Estefani Herron  To: ART Wilkerson - CNP  Sent: 1/19/2021 1:26 PM EST  Subject: Non-Urgent Medical Question    I dont feel as if Im getting any better, I actually feel like Im getting worse. As far as my sciatic nerve is concerned. I have uncontrollable diarrhea now I dont know what that is about if its the medication or what.

## 2021-01-20 ENCOUNTER — HOSPITAL ENCOUNTER (OUTPATIENT)
Dept: GENERAL RADIOLOGY | Age: 56
Discharge: HOME | End: 2021-01-22
Payer: COMMERCIAL

## 2021-01-20 ENCOUNTER — HOSPITAL ENCOUNTER (OUTPATIENT)
Age: 56
Discharge: HOME | End: 2021-01-20
Payer: COMMERCIAL

## 2021-01-20 DIAGNOSIS — M54.31 RIGHT SIDED SCIATICA: ICD-10-CM

## 2021-01-20 DIAGNOSIS — M51.37 DEGENERATIVE DISC DISEASE AT L5-S1 LEVEL: Primary | ICD-10-CM

## 2021-01-20 PROCEDURE — 72100 X-RAY EXAM L-S SPINE 2/3 VWS: CPT

## 2021-01-20 NOTE — PROGRESS NOTES
Spoke with patient and explained that we usually start with PT, if he fails PT then we get MRI and then send to orthopedic surgeon. He is refusing PT, he states he was told in the past he has bone spurs on a nerve and if he did PT then it could damage the nerve, he prefers to see a surgeon and let them make the decision. He states the testing he had done was years ago and can't remember where he had it done. I have put in a order for referral to Dr. Salma Shaw.     ART Morin-CNP  [place] [address] [address] [facility] [address]  [facility] [address]  Dept: [phone]  Dept Fax: [phone]

## 2021-01-26 DIAGNOSIS — M51.37 DEGENERATIVE DISC DISEASE AT L5-S1 LEVEL: Primary | ICD-10-CM

## 2021-01-26 RX ORDER — IBUPROFEN 800 MG/1
800 TABLET ORAL EVERY 6 HOURS PRN
Qty: 90 TABLET | Refills: 0 | Status: SHIPPED | OUTPATIENT
Start: 2021-01-26 | End: 2021-03-14 | Stop reason: SDUPTHER

## 2021-01-28 ENCOUNTER — OFFICE VISIT (OUTPATIENT)
Dept: ORTHOPEDIC SURGERY | Age: 56
End: 2021-01-28
Payer: COMMERCIAL

## 2021-01-28 VITALS — BODY MASS INDEX: 39.62 KG/M2 | TEMPERATURE: 98 F | WEIGHT: 283 LBS | HEART RATE: 96 BPM | HEIGHT: 71 IN

## 2021-01-28 DIAGNOSIS — M54.16 LUMBAR RADICULAR PAIN: ICD-10-CM

## 2021-01-28 DIAGNOSIS — M54.50 CHRONIC MIDLINE LOW BACK PAIN WITHOUT SCIATICA: Primary | ICD-10-CM

## 2021-01-28 DIAGNOSIS — G89.29 CHRONIC MIDLINE LOW BACK PAIN WITHOUT SCIATICA: Primary | ICD-10-CM

## 2021-01-28 DIAGNOSIS — M51.36 LUMBAR DEGENERATIVE DISC DISEASE: ICD-10-CM

## 2021-01-28 PROCEDURE — 99203 OFFICE O/P NEW LOW 30 MIN: CPT | Performed by: ORTHOPAEDIC SURGERY

## 2021-01-28 NOTE — PROGRESS NOTES
Patient ID: Estefani Herron is a 48 y.o. male. No chief complaint on file. HPI   Patient with a moderately long history of predominately low back pain with intermittent aggravations. Patient has been followed by a chiropractor intermittently for greater than 2 years. Patient has had an inversion table for greater than 3 years. Patient with new onset right radicular leg pain radiating from the buttock to the lateral calf. Pain is debilitating making it very difficult for him to work as he can virtually the second he stands or sits he starts her get a recurrence of the pain. Laying down the pain will go away completely in about 15 minutes. Patient reports historically the inversion table with alleviate his back pain fairly rapidly and help but stay away when he had intermittent flares. The inversion table actually helps his radicular leg pain but the second he returns to upright his radicular pain result turns. Patient is also been utilizing NSAIDs and a steroid Dosepak which have failed to relieve his symptoms.         Past Medical History:   Diagnosis Date    Arthritis     Diabetes mellitus ([address]     GERD (gastroesophageal reflux disease)     Hyperlipidemia     Hypertension     Kidney stone     Obesity     Positive colorectal cancer screening using Cologuard test 06/2019     Past Surgical History:   Procedure Laterality Date    BICEPS TENDON REPAIR Right 2/6/2020    BICEPS TENDON REPAIR - ARTHROSCOPIC BICEPS TENODESIS performed by Deep Lomax MD at [address] [facility] COLONOSCOPY N/A 7/30/2019    COLORECTAL CANCER SCREENING, NOT HIGH RISK performed by Emily Gutierrez MD at [address] [facility] Right 8/18/2020    CYSTO URETEROSCOPY HOLMIUM LASER W/STENT INSERTION performed by Ramírez Fiore MD at [facility]  11/06/2020 [address] N/A 11/6/2020 HERNIA VENTRAL REPAIR LAPAROSCOPIC ROBOTIC performed by Symone Arias MD at [facility] ARTHROSCOPY Right 2020    ROTATOR CUFF REPAIR WITH ARTHREX, Biceps tendon repair    SHOULDER ARTHROSCOPY Right 2020    SHOULDER ARTHROSCOPY ROTATOR CUFF REPAIR WITH ARTHREX performed by Shyam Rivas MD at [address] [place] [address] EXTRACTION       Family History   Problem Relation Age of Onset    Diabetes Mother     Diabetes Father      Social History     Occupational History    Not on file   Tobacco Use    Smoking status: Former Smoker     Packs/day: 2.00     Years: 35.00     Pack years: 70.00     Types: Cigarettes     Quit date:      Years since quittin.0    Smokeless tobacco: Never Used   Substance and Sexual Activity    Alcohol use: Yes     Alcohol/week: 21.0 standard drinks     Types: 21 Shots of liquor per week     Comment: Vodka & Tonic Q.O.D.    Drug use: Never    Sexual activity: Yes        Review of Systems   All other systems reviewed and are negative. Physical Exam  Vitals signs and nursing note reviewed. Constitutional:       Appearance: He is well-developed. HENT:      Head: Normocephalic and atraumatic. Nose: Nose normal.   Eyes:      Conjunctiva/sclera: Conjunctivae normal.   Neck:      Musculoskeletal: Normal range of motion and neck supple. Pulmonary:      Effort: Pulmonary effort is normal. No respiratory distress. Musculoskeletal:      Comments: Normal gait     Skin:     General: Skin is warm and dry. Neurological:      Mental Status: He is alert and oriented to person, place, and time. Sensory: No sensory deficit. Psychiatric:         Behavior: Behavior normal.         Thought Content:  Thought content normal.       Supine x-rays lumbar spine reveal straightening of lumbar lordosis reviewed from [address] [address] [facility] CT abdomen pelvis obtained for other reasons earlier this year slightly greater than average lumbar spondylosis      Assessment:          1. Chronic midline low back pain without sciatica    2. Lumbar radicular pain    3. Lumbar degenerative disc disease    4. BMI 39.0-39.9,adult      History of chronic intermittent low back pain with intermittent flares. More acute history of severe right radicular leg pain that is debilitating    Failure of inversion table, chiropractor, anti-inflammatories, steroids        Plan:     Mri lumbar    No orders of the defined types were placed in this encounter. Mukul Chopra MD    Please note that this chart was generated using voicerecognition Dragon dictation software. Although every effort was made to ensurethe accuracy of this automated transcription, some errors in transcription may haveoccurred.

## 2021-02-19 ENCOUNTER — HOSPITAL ENCOUNTER (OUTPATIENT)
Dept: MRI IMAGING | Age: 56
Discharge: HOME | End: 2021-02-21

## 2021-02-19 DIAGNOSIS — M54.16 LUMBAR RADICULAR PAIN: ICD-10-CM

## 2021-02-19 DIAGNOSIS — G89.29 CHRONIC MIDLINE LOW BACK PAIN WITHOUT SCIATICA: ICD-10-CM

## 2021-02-19 DIAGNOSIS — M51.36 LUMBAR DEGENERATIVE DISC DISEASE: ICD-10-CM

## 2021-02-19 DIAGNOSIS — M54.50 CHRONIC MIDLINE LOW BACK PAIN WITHOUT SCIATICA: ICD-10-CM

## 2021-02-19 PROCEDURE — 72148 MRI LUMBAR SPINE W/O DYE: CPT

## 2021-02-26 ENCOUNTER — TELEPHONE (OUTPATIENT)
Dept: ORTHOPEDIC SURGERY | Age: 56
End: 2021-02-26

## 2021-02-26 NOTE — TELEPHONE ENCOUNTER
Called patient a second time to discuss MRI denial appeal. Reached him directly. He states he can't stop by the office today, but could sign the form if it is emailed to him. He states he could do this over the weekend and send it back by Monday via e-mail.

## 2021-02-26 NOTE — TELEPHONE ENCOUNTER
Called patient regarding an MRI denial appeal. Left voicemail message. Patient's insurance company sent a form asking for patient to name a representative, which needs a signature. Asked if patient able to come in today to sign this form so appeal can be officially submitted. Requested call back to discuss.

## 2021-03-05 NOTE — TELEPHONE ENCOUNTER
Patient e-mailed back the signed form. Faxed MRI denial appeal on 3/3/21, same day as receipt of signed form.  Notified patient by e-mail and phone of successfully faxed appeal.

## 2021-03-14 DIAGNOSIS — M51.37 DEGENERATIVE DISC DISEASE AT L5-S1 LEVEL: ICD-10-CM

## 2021-03-15 RX ORDER — IBUPROFEN 800 MG/1
800 TABLET ORAL EVERY 6 HOURS PRN
Qty: 90 TABLET | Refills: 1 | Status: SHIPPED | OUTPATIENT
Start: 2021-03-15 | End: 2021-10-13 | Stop reason: SDUPTHER

## 2021-03-23 NOTE — TELEPHONE ENCOUNTER
Called to check on status of appeal and spoke with \"Barby\" who states the appeal was denied due to [facility] detailed information about conservative treatment in the last 6 months. \" This would include physical therapy, chiropractic care, home exercise, and medication. She states that \"this ase is closed. \" Next step options include:    -Start a second-level appeal with the health plan  -Open a new case    Due to confusion over why this MRI was completed despite being denied, called [facility] Radiology Department.

## 2021-03-23 NOTE — TELEPHONE ENCOUNTER
Called Appeals Dept to start a new request for MRI completed on 2/19/2021. Spoke with \"Lili HOOPER\" who helped start the new request. She transferred the call, after this, to an automated line, which informed that this case Acacia Ban been forwarded to Clinical Review for further evaluation. \"     The next step is to fax clinical documentation to: [phone]. Once this is successfully faxed, it will take 2-3 hours to be attached to the case. Once it is attached, it will take 2-3 days to be reviewed and decided upon. This office [facility]) should receive a fax regarding the decision.     Tracking number: [id number]

## 2021-03-23 NOTE — TELEPHONE ENCOUNTER
Faxed clinical documentation to PA Department at [phone] including Tracking number: [id number].

## 2021-03-23 NOTE — TELEPHONE ENCOUNTER
Due to confusion over why this MRI was completed despite being denied, called [place] [facility] Radiology Department. Spoke with \"Chel,\" in the Prior Authorizations Department. She states the PA was submitted on 2/15/21 and denied on 2/18/21. Sandra Majano states Nohemi Mckenzie at your office was notified of the denial.\" Patient proceeded with the MRI on 2/19/21, \"possibly being unaware that it wasn't approved,\" according to Sandra Majano. She states that, \"unless someone writes it in the notes for the patient's appointment, the patient isn't always told of a denial when they arrive for their service. \"    Sandra Majano states that, unless we can prove patient had at least 4-6 weeks of conservative treatment, which has failed, and that this treatment was within 6 months of the MRI request, this denial will likely be upheld. In Dr. Kelly Alonzo' office note dated 1/28/2021, clear statements about prior failed conservative treatments are prominently listed. Will open a new case and forward these notes for review in hope this denial can be overturned.

## 2021-03-30 NOTE — TELEPHONE ENCOUNTER
Attempted follow-up call for update on appeal, but call did not connect. It rang and then there was silence. Disconnected the call.

## 2021-04-07 NOTE — TELEPHONE ENCOUNTER
Attempted to fax appeals to [employer] and [employer] on 4/6/21 with no transaction reports coming back. Faxed again on 4/7/21 with positive transaction reports to both companies. Appeal complete. Awaiting response.

## 2021-04-17 DIAGNOSIS — I10 ESSENTIAL HYPERTENSION: ICD-10-CM

## 2021-04-17 DIAGNOSIS — E11.9 TYPE 2 DIABETES MELLITUS WITHOUT COMPLICATION, WITHOUT LONG-TERM CURRENT USE OF INSULIN (HCC): ICD-10-CM

## 2021-04-19 RX ORDER — METFORMIN HYDROCHLORIDE 500 MG/1
TABLET, EXTENDED RELEASE ORAL
Qty: 180 TABLET | Refills: 1 | Status: SHIPPED | OUTPATIENT
Start: 2021-04-19 | End: 2021-04-20 | Stop reason: SDUPTHER

## 2021-04-19 RX ORDER — LOSARTAN POTASSIUM 100 MG/1
TABLET ORAL
Qty: 90 TABLET | Refills: 1 | Status: SHIPPED | OUTPATIENT
Start: 2021-04-19 | End: 2021-10-13 | Stop reason: SDUPTHER

## 2021-04-20 DIAGNOSIS — D58.2 ELEVATED HEMOGLOBIN (HCC): ICD-10-CM

## 2021-04-20 DIAGNOSIS — F10.10 ALCOHOL USE DISORDER, MILD, ABUSE: ICD-10-CM

## 2021-04-20 DIAGNOSIS — F41.9 ANXIETY AND DEPRESSION: ICD-10-CM

## 2021-04-20 DIAGNOSIS — E78.00 PURE HYPERCHOLESTEROLEMIA: ICD-10-CM

## 2021-04-20 DIAGNOSIS — E11.9 TYPE 2 DIABETES MELLITUS WITHOUT COMPLICATION, WITHOUT LONG-TERM CURRENT USE OF INSULIN (HCC): ICD-10-CM

## 2021-04-20 DIAGNOSIS — I10 ESSENTIAL HYPERTENSION: ICD-10-CM

## 2021-04-20 DIAGNOSIS — K21.9 GASTRIC REFLUX: ICD-10-CM

## 2021-04-20 DIAGNOSIS — F32.A ANXIETY AND DEPRESSION: ICD-10-CM

## 2021-04-20 RX ORDER — METOPROLOL TARTRATE 50 MG/1
50 TABLET, FILM COATED ORAL DAILY
Qty: 90 TABLET | Refills: 0 | Status: SHIPPED | OUTPATIENT
Start: 2021-04-20 | End: 2021-07-01

## 2021-04-20 RX ORDER — HYDROCHLOROTHIAZIDE 25 MG/1
25 TABLET ORAL DAILY
Qty: 90 TABLET | Refills: 1 | Status: SHIPPED | OUTPATIENT
Start: 2021-04-20 | End: 2021-07-15 | Stop reason: SDUPTHER

## 2021-04-20 RX ORDER — METFORMIN HYDROCHLORIDE 500 MG/1
500 TABLET, EXTENDED RELEASE ORAL
Qty: 180 TABLET | Refills: 1 | Status: SHIPPED | OUTPATIENT
Start: 2021-04-20 | End: 2021-07-16

## 2021-04-20 RX ORDER — ATORVASTATIN CALCIUM 10 MG/1
10 TABLET, FILM COATED ORAL NIGHTLY
Qty: 90 TABLET | Refills: 1 | Status: SHIPPED | OUTPATIENT
Start: 2021-04-20 | End: 2021-07-15 | Stop reason: SDUPTHER

## 2021-04-20 RX ORDER — OMEPRAZOLE 20 MG/1
20 CAPSULE, DELAYED RELEASE ORAL DAILY
Qty: 90 CAPSULE | Refills: 0 | Status: SHIPPED | OUTPATIENT
Start: 2021-04-20 | End: 2021-07-15 | Stop reason: SDUPTHER

## 2021-04-20 RX ORDER — ASPIRIN 81 MG/1
81 TABLET ORAL DAILY
Qty: 90 TABLET | Refills: 0 | Status: SHIPPED | OUTPATIENT
Start: 2021-04-20 | End: 2021-07-01

## 2021-04-28 NOTE — TELEPHONE ENCOUNTER
Call [facility] Appeals Department again to check on status of appeal. Spoke with \"Mendy JUAREZ\" who states the appeal was denied on 4/14/21 due to lack of detailed information regarding conservative treatment (dates and duration of failed physical therapy, medication therapy, chiropractic care, and home exercises). Referred to appeal which was faxed. Though it states which therapies the patient tried and for how long, this was considered insufficient information. Wilfred Rolon states specific date ranges needed to be included. Next step is [place] second-level appeal directly with [employer]. \" Their number is: [phone].

## 2021-04-30 NOTE — TELEPHONE ENCOUNTER
Called Mayra, the [facility] Department to initiate a second-level appeal. After a hold time of 10 minutes, elected to leave a voicemail message requesting a call back.

## 2021-07-01 DIAGNOSIS — I10 ESSENTIAL HYPERTENSION: ICD-10-CM

## 2021-07-01 DIAGNOSIS — D58.2 ELEVATED HEMOGLOBIN (HCC): ICD-10-CM

## 2021-07-01 RX ORDER — HYDROGEN PEROXIDE 2.65 ML/100ML
LIQUID ORAL; TOPICAL
Qty: 90 TABLET | Refills: 0 | Status: SHIPPED | OUTPATIENT
Start: 2021-07-01 | End: 2021-12-07 | Stop reason: SDUPTHER

## 2021-07-01 RX ORDER — METOPROLOL TARTRATE 50 MG/1
TABLET, FILM COATED ORAL
Qty: 90 TABLET | Refills: 0 | Status: SHIPPED | OUTPATIENT
Start: 2021-07-01 | End: 2021-12-07 | Stop reason: SDUPTHER

## 2021-07-15 ENCOUNTER — HOSPITAL ENCOUNTER (OUTPATIENT)
Age: 56
Discharge: HOME | End: 2021-07-15
Payer: COMMERCIAL

## 2021-07-15 ENCOUNTER — OFFICE VISIT (OUTPATIENT)
Dept: FAMILY MEDICINE CLINIC | Age: 56
End: 2021-07-15
Payer: COMMERCIAL

## 2021-07-15 VITALS
WEIGHT: 278 LBS | DIASTOLIC BLOOD PRESSURE: 84 MMHG | OXYGEN SATURATION: 94 % | HEART RATE: 85 BPM | HEIGHT: 71 IN | TEMPERATURE: 96.8 F | SYSTOLIC BLOOD PRESSURE: 134 MMHG | BODY MASS INDEX: 38.92 KG/M2

## 2021-07-15 DIAGNOSIS — D58.2 ELEVATED HEMOGLOBIN (HCC): ICD-10-CM

## 2021-07-15 DIAGNOSIS — F41.9 ANXIETY AND DEPRESSION: ICD-10-CM

## 2021-07-15 DIAGNOSIS — R74.01 ELEVATED ALT MEASUREMENT: ICD-10-CM

## 2021-07-15 DIAGNOSIS — F32.A ANXIETY AND DEPRESSION: ICD-10-CM

## 2021-07-15 DIAGNOSIS — E78.00 PURE HYPERCHOLESTEROLEMIA: ICD-10-CM

## 2021-07-15 DIAGNOSIS — F10.10 ALCOHOL USE DISORDER, MILD, ABUSE: ICD-10-CM

## 2021-07-15 DIAGNOSIS — R06.02 SOB (SHORTNESS OF BREATH): ICD-10-CM

## 2021-07-15 DIAGNOSIS — R07.89 CHEST PRESSURE: Primary | ICD-10-CM

## 2021-07-15 DIAGNOSIS — E11.9 TYPE 2 DIABETES MELLITUS WITHOUT COMPLICATION, WITHOUT LONG-TERM CURRENT USE OF INSULIN (HCC): ICD-10-CM

## 2021-07-15 DIAGNOSIS — I10 ESSENTIAL HYPERTENSION: ICD-10-CM

## 2021-07-15 DIAGNOSIS — Z12.2 ENCOUNTER FOR SCREENING FOR LUNG CANCER: ICD-10-CM

## 2021-07-15 DIAGNOSIS — Z87.891 FORMER SMOKER: ICD-10-CM

## 2021-07-15 DIAGNOSIS — R07.89 CHEST PRESSURE: ICD-10-CM

## 2021-07-15 DIAGNOSIS — K21.9 GASTRIC REFLUX: ICD-10-CM

## 2021-07-15 DIAGNOSIS — K21.9 GASTROESOPHAGEAL REFLUX DISEASE WITHOUT ESOPHAGITIS: ICD-10-CM

## 2021-07-15 DIAGNOSIS — Z87.891 PERSONAL HISTORY OF TOBACCO USE: ICD-10-CM

## 2021-07-15 DIAGNOSIS — R42 DIZZINESS: ICD-10-CM

## 2021-07-15 LAB
ALBUMIN SERPL-MCNC: 4.4 G/DL (ref 3.5–5.2)
ALBUMIN/GLOBULIN RATIO: 1.4 (ref 1–2.5)
ALP BLD-CCNC: 118 U/L (ref 40–129)
ALT SERPL-CCNC: 60 U/L (ref 5–41)
ANION GAP SERPL CALCULATED.3IONS-SCNC: 13 MMOL/L (ref 9–17)
AST SERPL-CCNC: 39 U/L
BILIRUB SERPL-MCNC: 0.37 MG/DL (ref 0.3–1.2)
BUN BLDV-MCNC: 12 MG/DL (ref 6–20)
BUN/CREAT BLD: ABNORMAL (ref 9–20)
CALCIUM SERPL-MCNC: 9.7 MG/DL (ref 8.6–10.4)
CHLORIDE BLD-SCNC: 96 MMOL/L (ref 98–107)
CHOLESTEROL/HDL RATIO: 4
CHOLESTEROL: 172 MG/DL
CO2: 25 MMOL/L (ref 20–31)
CREAT SERPL-MCNC: 0.84 MG/DL (ref 0.7–1.2)
CREATININE URINE POCT: NORMAL
ESTIMATED AVERAGE GLUCOSE: 183 MG/DL
GFR AFRICAN AMERICAN: >60 ML/MIN
GFR NON-AFRICAN AMERICAN: >60 ML/MIN
GFR SERPL CREATININE-BSD FRML MDRD: ABNORMAL ML/MIN/{1.73_M2}
GFR SERPL CREATININE-BSD FRML MDRD: ABNORMAL ML/MIN/{1.73_M2}
GLUCOSE BLD-MCNC: 179 MG/DL (ref 70–99)
HBA1C MFR BLD: 8 % (ref 4–6)
HCT VFR BLD CALC: 53.3 % (ref 40.7–50.3)
HDLC SERPL-MCNC: 43 MG/DL
HEMOGLOBIN: 17.4 G/DL (ref 13–17)
LDL CHOLESTEROL: 87 MG/DL (ref 0–130)
MCH RBC QN AUTO: 31.4 PG (ref 25.2–33.5)
MCHC RBC AUTO-ENTMCNC: 32.6 G/DL (ref 28.4–34.8)
MCV RBC AUTO: 96 FL (ref 82.6–102.9)
MICROALBUMIN/CREAT 24H UR: NORMAL MG/G{CREAT}
MICROALBUMIN/CREAT UR-RTO: NORMAL
NRBC AUTOMATED: 0 PER 100 WBC
PDW BLD-RTO: 12.4 % (ref 11.8–14.4)
PLATELET # BLD: 248 K/UL (ref 138–453)
PMV BLD AUTO: 11.4 FL (ref 8.1–13.5)
POTASSIUM SERPL-SCNC: 4.3 MMOL/L (ref 3.7–5.3)
RBC # BLD: 5.55 M/UL (ref 4.21–5.77)
SODIUM BLD-SCNC: 134 MMOL/L (ref 135–144)
TOTAL PROTEIN: 7.6 G/DL (ref 6.4–8.3)
TRIGL SERPL-MCNC: 211 MG/DL
TROPONIN INTERP: NORMAL
TROPONIN T: NORMAL NG/ML
TROPONIN, HIGH SENSITIVITY: <6 NG/L (ref 0–22)
VLDLC SERPL CALC-MCNC: ABNORMAL MG/DL (ref 1–30)
WBC # BLD: 11.8 K/UL (ref 3.5–11.3)

## 2021-07-15 PROCEDURE — 80053 COMPREHEN METABOLIC PANEL: CPT

## 2021-07-15 PROCEDURE — 82044 UR ALBUMIN SEMIQUANTITATIVE: CPT | Performed by: NURSE PRACTITIONER

## 2021-07-15 PROCEDURE — 3051F HG A1C>EQUAL 7.0%<8.0%: CPT | Performed by: NURSE PRACTITIONER

## 2021-07-15 PROCEDURE — 83036 HEMOGLOBIN GLYCOSYLATED A1C: CPT

## 2021-07-15 PROCEDURE — 99215 OFFICE O/P EST HI 40 MIN: CPT | Performed by: NURSE PRACTITIONER

## 2021-07-15 PROCEDURE — 84484 ASSAY OF TROPONIN QUANT: CPT

## 2021-07-15 PROCEDURE — 80061 LIPID PANEL: CPT

## 2021-07-15 PROCEDURE — G0296 VISIT TO DETERM LDCT ELIG: HCPCS | Performed by: NURSE PRACTITIONER

## 2021-07-15 PROCEDURE — 36415 COLL VENOUS BLD VENIPUNCTURE: CPT

## 2021-07-15 PROCEDURE — 93000 ELECTROCARDIOGRAM COMPLETE: CPT | Performed by: NURSE PRACTITIONER

## 2021-07-15 PROCEDURE — 85027 COMPLETE CBC AUTOMATED: CPT

## 2021-07-15 RX ORDER — HYDROCHLOROTHIAZIDE 25 MG/1
25 TABLET ORAL DAILY
Qty: 90 TABLET | Refills: 1 | Status: SHIPPED | OUTPATIENT
Start: 2021-07-15 | End: 2021-12-07 | Stop reason: SDUPTHER

## 2021-07-15 RX ORDER — ATORVASTATIN CALCIUM 10 MG/1
10 TABLET, FILM COATED ORAL NIGHTLY
Qty: 90 TABLET | Refills: 1 | Status: SHIPPED | OUTPATIENT
Start: 2021-07-15 | End: 2021-12-07 | Stop reason: SDUPTHER

## 2021-07-15 RX ORDER — OMEPRAZOLE 40 MG/1
40 CAPSULE, DELAYED RELEASE ORAL DAILY
Qty: 30 CAPSULE | Refills: 3 | Status: SHIPPED | OUTPATIENT
Start: 2021-07-15 | End: 2021-12-07 | Stop reason: SDUPTHER

## 2021-07-15 SDOH — ECONOMIC STABILITY: FOOD INSECURITY: WITHIN THE PAST 12 MONTHS, THE FOOD YOU BOUGHT JUST DIDN'T LAST AND YOU DIDN'T HAVE MONEY TO GET MORE.: NEVER TRUE

## 2021-07-15 SDOH — ECONOMIC STABILITY: FOOD INSECURITY: WITHIN THE PAST 12 MONTHS, YOU WORRIED THAT YOUR FOOD WOULD RUN OUT BEFORE YOU GOT MONEY TO BUY MORE.: NEVER TRUE

## 2021-07-15 ASSESSMENT — ENCOUNTER SYMPTOMS
BACK PAIN: 0
SHORTNESS OF BREATH: 1
ABDOMINAL DISTENTION: 0
SORE THROAT: 0
COUGH: 0
DIARRHEA: 0
COLOR CHANGE: 0
CHEST TIGHTNESS: 0
CONSTIPATION: 0
NAUSEA: 1
ABDOMINAL PAIN: 0
RHINORRHEA: 0

## 2021-07-15 ASSESSMENT — SOCIAL DETERMINANTS OF HEALTH (SDOH): HOW HARD IS IT FOR YOU TO PAY FOR THE VERY BASICS LIKE FOOD, HOUSING, MEDICAL CARE, AND HEATING?: NOT HARD AT ALL

## 2021-07-15 NOTE — PROGRESS NOTES
Melly Fuller, APRN-CNP  [facility]  [id number]  [place] , [address]  [address] [address]  Dept: [phone]  Dept Fax: [phone]     Patient ID: Randall Shultz is a 55 y.o. male Established patient    HPI    Pt here today for f/u on chronic medical problems, go over labs and/or diagnostic studies, and medication refills. Pt denies any fever or chills. Pt today denies any HA, chest pain, or SOB. Pt denies any N/V/D/C or abdominal pain. Shortness of breath - having it for about a month now, he notices it when going up stairs, when he stops it does go away. dizziness- just started recently and noticed more the other day when he was messing around with grand kids wrestling and it came on suddenly and after a few minutes of relaxing that went away. Denies LOC  Nose bleeds- that are happening the last 2 days and it is alot and stops on own after about 30 seconds  Chest pressure started a couple days ago, he was sitting watching TV and it came on and then after a minute or two it went away. He did have it happen this morning after having coffee, he got some nausea and then the chest pressure came on and started sweating,  he relaxed and it went away. The pressure is located in the midsternal area, denies radiating. He states he has never had a stress test done. He states he always takes the symptoms     - having trouble sleeping, notices he is restless lately, waking up and not feeling well rested, he is tired throughout the day. Otherwise pt doing well on current tx and no other concerns today. The patient's past medical, surgical, social, and family history as well as his current medications and allergies were reviewed as documented in today's encounter [address] [place]. Previous office notes, labs, imaging and hospital records were reviewed prior to and during encounter.     Current Outpatient Medications on File Prior to Visit   Medication Sig Dispense Refill    metoprolol tartrate (LOPRESSOR) 50 MG tablet Take 1 tablet by mouth once daily 90 tablet 0    EQ ASPIRIN ADULT LOW DOSE 81 MG EC tablet Take 1 tablet by mouth once daily 90 tablet 0    atorvastatin (LIPITOR) 10 MG tablet Take 1 tablet by mouth nightly 90 tablet 1    hydroCHLOROthiazide (HYDRODIURIL) 25 MG tablet Take 1 tablet by mouth daily 90 tablet 1    omeprazole (PRILOSEC) 20 MG delayed release capsule Take 1 capsule by mouth Daily 90 capsule 0    sertraline (ZOLOFT) 50 MG tablet Take 1.5 tablets by mouth daily 135 tablet 0    metFORMIN (GLUCOPHAGE-XR) 500 MG extended release tablet Take 1 tablet by mouth daily (with breakfast) 180 tablet 1    losartan (COZAAR) 100 MG tablet Take 1 tablet by mouth once daily 90 tablet 1    ibuprofen (ADVIL;MOTRIN) 800 MG tablet Take 1 tablet by mouth every 6 hours as needed for Pain 90 tablet 1    fluticasone (FLONASE) 50 MCG/ACT nasal spray 1 spray by Each Nostril route daily 2 Bottle 1    ondansetron (ZOFRAN) 4 MG tablet Take 1 tablet by mouth every 8 hours as needed for Nausea 20 tablet 0     No current facility-administered medications on file prior to visit. Subjective:     Review of Systems   Constitutional: Negative for activity change, fatigue and fever. HENT: Negative for congestion, ear pain, rhinorrhea and sore throat. Respiratory: Positive for shortness of breath. Negative for cough and chest tightness. Cardiovascular: Positive for chest pain (pressure in the center of chest ). Negative for palpitations. Gastrointestinal: Positive for nausea. Negative for abdominal distention, abdominal pain, constipation and diarrhea. Endocrine: Negative for polydipsia, polyphagia and polyuria. Genitourinary: Negative for difficulty urinating and dysuria. Musculoskeletal: Negative for arthralgias, back pain and myalgias. Skin: Negative for color change and rash. Neurological: Positive for dizziness and headaches (tylenol). Negative for weakness and light-headedness. Hematological: Negative for adenopathy. Psychiatric/Behavioral: Negative for agitation and behavioral problems. The patient is not nervous/anxious. Vitals:    07/15/21 0744   BP: (!) 143/96   Pulse: 85   Temp: 96.8 °F (36 °C)   SpO2: 94%     Objective:     Physical Exam  Vitals reviewed. Constitutional:       General: He is not in acute distress. Appearance: Normal appearance. He is diaphoretic. HENT:      Head: Normocephalic and atraumatic. Right Ear: External ear normal.      Left Ear: External ear normal.      Nose: Nose normal.      Mouth/Throat:      Mouth: Mucous membranes are moist.      Pharynx: No oropharyngeal exudate or posterior oropharyngeal erythema. Eyes:      Extraocular Movements: Extraocular movements intact. Conjunctiva/sclera: Conjunctivae normal.      Pupils: Pupils are equal, round, and reactive to light. Cardiovascular:      Rate and Rhythm: Normal rate and regular rhythm. Pulses: Normal pulses. Heart sounds: Normal heart sounds. No murmur heard. Pulmonary:      Effort: Pulmonary effort is normal. No respiratory distress. Breath sounds: Normal breath sounds. No wheezing or rales. Abdominal:      General: Bowel sounds are normal. There is no distension. Palpations: Abdomen is soft. Tenderness: There is no abdominal tenderness. Musculoskeletal:         General: Normal range of motion. Cervical back: Normal range of motion. Right lower leg: No edema. Left lower leg: No edema. Lymphadenopathy:      Cervical: No cervical adenopathy. Skin:     General: Skin is warm. Neurological:      General: No focal deficit present. Mental Status: He is alert and oriented to person, place, and time. Deep Tendon Reflexes: Reflexes normal.   Psychiatric:         Mood and Affect: Mood normal.         Behavior: Behavior normal. Behavior is cooperative.      Visual Medication re-filled as needed, con't medications as prescribed, con't current tx plan  - Continue HCTZ, lopressor, aspirin and cozaar as previously prescribed  - Education provided on maintaining a low sodium diet and routine exercise. - Advised to seek emergent tx if SBP>180 and/or DBP>110, any chest pain, h/a or vision changes    Gastroesophageal reflux disease without esophagitis  - will increase Prilosec to 40 mg daily.   - Patient educated on avoiding trigger food/drinks such as caffeine, soda, chocolate, greasy/fatty, spicy foods.  - Sleep with head of bed elevated, avoid lying flat after eating and avoid restrictive clothing around waist.    Pure hypercholesterolemia  - Stable: Medication re-filled as needed, con't medications as prescribed, con't current tx plan  - Lifestyle changes discussed and encouraged: Decrease fats, sugars, carbohydrates, and increase routine exercise  -try  to get 150 minutes of aerobic activity a week and watch calorie portions and to limit her carbs in her diet. - Foods that helps increase HDL include the following: Fish, nuts, flax, avocados, and legumes (such as soy, kidney beans, chickpeas). - Will cont with low fat/chol diet and lipitor as ordered. Type 2 diabetes mellitus without complication, without long-term current use of insulin (HCC)  - Stable: Medication re-filled as needed, con't medications as prescribed, con't current tx plan  - Continue with Metformin as previously prescribed. - diabetic foot exam and microalbumin completed today in office.   - Cont with daily foot exams and yearly eye exams  - Diabetes education discussed and material provided. - Last HGBA1C was 7.0    Anxiety and depression  - Stable: Medication re-filled as needed, con't medications as prescribed, con't current tx plan  - Continue with zoloft as previously prescribed.    - Pt encouraged to deep breath and relax through anxious moments   - Offered reassurance and allowed to ventilate feelings and ask questions.   - Non-pharmological coping methods discussed. Personal history of tobacco use  Encounter for screening for lung cancer  . Former smoker  - AR VISIT TO DISCUSS LUNG CA SCREEN W LDCT  - CT Lung Screen (Annual); Future     Low Dose CT (LDCT) Lung Screening criteria met   Age 50-69   Pack year smoking >30   Still smoking or less than 15 year since quit   No sign or symptoms of lung cancer   > 11 months since last LDCT     Risks and benefits of lung cancer screening with LDCT scans discussed:    Significance of positive screen - False-positive LDCT results often occur. 95% of all positive results do not lead to a diagnosis of cancer. Usually further imaging can resolve most false-positive results; however, some patients may require invasive procedures. Over diagnosis risk - 10% to 12% of screen-detected lung cancer cases are over diagnosedthat is, the cancer would not have been detected in the patient's lifetime without the screening. Need for follow up screens annually to continue lung cancer screening effectiveness     Risks associated with radiation from annual LDCT- Radiation exposure is about the same as for a mammogram, which is about 1/3 of the annual background radiation exposure from everyday life. Starting screening at age 54 is not likely to increase cancer risk from radiation exposure. Patients with comorbidities resulting in life expectancy of < 10 years, or that would preclude treatment of an abnormality identified on CT, should not be screened due to lack of benefit. To obtain maximal benefit from this screening, smoking cessation and long-term abstinence from smoking is critical     - will get above noted labs and call with results. - discussed getting a sleep study, I would like for him to get the stress test completed first and then we will proceed with sleep study. - pt verbalized understanding plan of care.      Medications, labs, diagnostic studies, consultations and follow-up as documented in this encounter. Rest of systems unchanged, continue current treatments    On this date 7/15/2021 I have spent 45 minutes reviewing previous notes, test results and face to face with the patient discussing the diagnosis and importance of compliance with the treatment plan as well as documenting on the day of the visit.     Van Anthony, APRN-CNP

## 2021-07-15 NOTE — PATIENT INSTRUCTIONS
- labs today  - get stress test  - get CT lung screening   - follow up in 1 month     Central scheduling number: [phone]    What is lung cancer screening? Lung cancer screening is a way in which doctors check the lungs for early signs of cancer in people who have no symptoms of lung cancer. A low-dose CT scan uses much less radiation than a normal CT scan and shows a more detailed image of the lungs than a standard X-ray. The goal of lung cancer screening is to find cancer early, before it has a chance to grow, spread, or cause problems. One large study found that smokers who were screened with low-dose CT scans were less likely to die of lung cancer than those who were screened with standard X-ray. Below is a summary of the things you need to know regarding screening for lung cancer with low-dose computed tomography (LDCT). This is a screening program that involves routine annual screening with LDCT studies of the lung. The LDCTs are done using low-dose radiation that is not thought to increase your cancer risk. If you have other serious medical conditions (other cancers, congestive heart failure) that limit your life expectancy to less than 10 years, you should not undergo lung cancer screening with LDCT. The chance is 20%-60% that the LDCT result will show abnormalities. This would require additional testing which could include repeat imaging or even invasive procedures. Most (about 95%) of \"abnormal\" LDCT results are false in the sense that no lung cancer is ultimately found. Additionally, some (about 10%) of the cancers found would not affect your life expectancy, even if undetected and untreated. If you are still smoking, the single most important thing that you can do to reduce your risk of dying of lung cancer is to quit. For this screening to be covered by Medicare and most other insurers, strict criteria must be met.   If you do not meet these criteria, but still wish to undergo LDCT testing, you will be required to sign a waiver indicating your willingness to pay for the scan.

## 2021-07-16 DIAGNOSIS — E11.9 TYPE 2 DIABETES MELLITUS WITHOUT COMPLICATION, WITHOUT LONG-TERM CURRENT USE OF INSULIN (HCC): ICD-10-CM

## 2021-07-19 ENCOUNTER — TELEPHONE (OUTPATIENT)
Dept: FAMILY MEDICINE CLINIC | Age: 56
End: 2021-07-19

## 2021-07-19 DIAGNOSIS — R06.02 SOB (SHORTNESS OF BREATH): ICD-10-CM

## 2021-07-19 DIAGNOSIS — R42 DIZZINESS: ICD-10-CM

## 2021-07-19 DIAGNOSIS — R07.89 CHEST PRESSURE: Primary | ICD-10-CM

## 2021-07-19 NOTE — TELEPHONE ENCOUNTER
Nanda Arroyo from stress lab called and asked that the stress test ordered be changed from an excercise one to a Cardiac stress test with RX if possible. She is concerned with the size of patient and he would have to stop metoprolol 24 hrs before test. She said at the dosage he takes, she is worried with the exercise that he will become hypertensive without it. The test is Wednesday the 28th.

## 2021-07-21 ENCOUNTER — HOSPITAL ENCOUNTER (OUTPATIENT)
Dept: NON INVASIVE DIAGNOSTICS | Age: 56
Discharge: HOME | End: 2021-07-23
Payer: COMMERCIAL

## 2021-07-21 ENCOUNTER — HOSPITAL ENCOUNTER (OUTPATIENT)
Dept: NUCLEAR MEDICINE | Age: 56
Discharge: HOME | End: 2021-07-23
Payer: COMMERCIAL

## 2021-07-21 VITALS — DIASTOLIC BLOOD PRESSURE: 108 MMHG | SYSTOLIC BLOOD PRESSURE: 148 MMHG | RESPIRATION RATE: 18 BRPM | HEART RATE: 103 BPM

## 2021-07-21 DIAGNOSIS — R07.89 CHEST PRESSURE: ICD-10-CM

## 2021-07-21 DIAGNOSIS — R42 DIZZINESS: ICD-10-CM

## 2021-07-21 DIAGNOSIS — R06.02 SOB (SHORTNESS OF BREATH): ICD-10-CM

## 2021-07-21 PROCEDURE — 3430000000 HC RX DIAGNOSTIC RADIOPHARMACEUTICAL: Performed by: NURSE PRACTITIONER

## 2021-07-21 PROCEDURE — 2580000003 HC RX 258: Performed by: NURSE PRACTITIONER

## 2021-07-21 PROCEDURE — A9500 TC99M SESTAMIBI: HCPCS | Performed by: NURSE PRACTITIONER

## 2021-07-21 PROCEDURE — 78452 HT MUSCLE IMAGE SPECT MULT: CPT

## 2021-07-21 PROCEDURE — 93017 CV STRESS TEST TRACING ONLY: CPT

## 2021-07-21 PROCEDURE — 6360000002 HC RX W HCPCS: Performed by: NURSE PRACTITIONER

## 2021-07-21 RX ORDER — SODIUM CHLORIDE 0.9 % (FLUSH) 0.9 %
10 SYRINGE (ML) INJECTION ONCE
Status: DISCONTINUED | OUTPATIENT
Start: 2021-07-21 | End: 2021-07-22 | Stop reason: ALTCHOICE

## 2021-07-21 RX ORDER — SODIUM CHLORIDE 0.9 % (FLUSH) 0.9 %
5-40 SYRINGE (ML) INJECTION PRN
Status: DISCONTINUED | OUTPATIENT
Start: 2021-07-21 | End: 2021-07-21 | Stop reason: ALTCHOICE

## 2021-07-21 RX ORDER — AMINOPHYLLINE DIHYDRATE 25 MG/ML
50 INJECTION, SOLUTION INTRAVENOUS PRN
Status: DISCONTINUED | OUTPATIENT
Start: 2021-07-21 | End: 2021-07-21 | Stop reason: ALTCHOICE

## 2021-07-21 RX ORDER — SODIUM CHLORIDE 9 MG/ML
500 INJECTION, SOLUTION INTRAVENOUS CONTINUOUS PRN
Status: DISCONTINUED | OUTPATIENT
Start: 2021-07-21 | End: 2021-07-21 | Stop reason: ALTCHOICE

## 2021-07-21 RX ORDER — ALBUTEROL SULFATE 90 UG/1
2 AEROSOL, METERED RESPIRATORY (INHALATION) PRN
Status: DISCONTINUED | OUTPATIENT
Start: 2021-07-21 | End: 2021-07-21 | Stop reason: ALTCHOICE

## 2021-07-21 RX ORDER — METOPROLOL TARTRATE 5 MG/5ML
5 INJECTION INTRAVENOUS EVERY 5 MIN PRN
Status: DISCONTINUED | OUTPATIENT
Start: 2021-07-21 | End: 2021-07-21 | Stop reason: ALTCHOICE

## 2021-07-21 RX ORDER — ATROPINE SULFATE 0.1 MG/ML
0.5 INJECTION INTRAVENOUS EVERY 5 MIN PRN
Status: DISCONTINUED | OUTPATIENT
Start: 2021-07-21 | End: 2021-07-21 | Stop reason: ALTCHOICE

## 2021-07-21 RX ORDER — NITROGLYCERIN 0.4 MG/1
0.4 TABLET SUBLINGUAL EVERY 5 MIN PRN
Status: DISCONTINUED | OUTPATIENT
Start: 2021-07-21 | End: 2021-07-21 | Stop reason: ALTCHOICE

## 2021-07-21 RX ADMIN — REGADENOSON 0.4 MG: 0.08 INJECTION, SOLUTION INTRAVENOUS at 09:02

## 2021-07-21 RX ADMIN — SODIUM CHLORIDE, PRESERVATIVE FREE 10 ML: 5 INJECTION INTRAVENOUS at 09:02

## 2021-07-21 NOTE — PROGRESS NOTES
Lexiscan stress test completed and reviewed by RILEY Sellers. Patient experienced dizziness and tingling with symptom resolution in recovery. Tolerated test well. PO caffeine provided. IV removed. VS returned to baseline, see flow sheets for documented VS throughout procedure.

## 2021-07-21 NOTE — PROGRESS NOTES
Patient present for lexiscan stress test. Educated on procedure. All questions/concerns addressed. Allergies and medication reviewed. Patient prepped for procedure. Stress test will be supervised by RILEY Angeles.

## 2021-07-22 ENCOUNTER — HOSPITAL ENCOUNTER (OUTPATIENT)
Dept: NUCLEAR MEDICINE | Age: 56
Discharge: HOME | End: 2021-07-24
Payer: COMMERCIAL

## 2021-07-22 PROCEDURE — 3430000000 HC RX DIAGNOSTIC RADIOPHARMACEUTICAL: Performed by: NURSE PRACTITIONER

## 2021-07-22 PROCEDURE — A9500 TC99M SESTAMIBI: HCPCS | Performed by: NURSE PRACTITIONER

## 2021-07-22 RX ADMIN — TETRAKIS(2-METHOXYISOBUTYLISOCYANIDE)COPPER(I) TETRAFLUOROBORATE 43.2 MILLICURIE: 1 INJECTION, POWDER, LYOPHILIZED, FOR SOLUTION INTRAVENOUS at 07:42

## 2021-07-23 LAB
LV EF: 62 %
LVEF MODALITY: NORMAL

## 2021-07-23 NOTE — PROCEDURES
[address]                [address] [facility], [facility] [address]                              CARDIAC STRESS TEST    PATIENT NAME: Adilene Ortiz                 :        1965  MED REC NO:   [id number]                             ROOM:  ACCOUNT NO:   [de-identified]                           ADMIT DATE: 2021  PROVIDER:     Edward Naranjo    CARDIOVASCULAR DIAGNOSTIC DEPARTMENT    DATE OF STUDY:  2021    ORDERING PROVIDER:  RILEY Rodriguez    PRIMARY CARE PROVIDER:  RILEY Rodriguez    INTERPRETING PHYSICIAN:  Jill Sanchez MD    LEXISCAN STRESS TESTING    TEST TYPE: LEXISCAN CARDIOLYTE STRESS TEST  INDICATION: CHEST PAIN, DIZZY WITH ACTIVITY, SOB  RESTING HEART RATE: 86 bpm  RESTING BLOOD PRESSURE: 147/102 mmHg  PEAK HEART RATE: 120 bpm  PEAK BLOOD PRESSURE: 150/104 mmHg    MEDICATION(S) GIVEN: 0.4 mg IV LEXISCAN. PO CAFFEINE. REASON FOR TERMINATION: MEDICATION INFUSION COMPLETE  SYMPTOMS: DIZZINESS POST INJECTION. RESOLVED IN RECOVERY. RESTING EKG: NORMAL. OCCASIONAL PVC. STRESS HEART RESPONSE: NORMAL RESPONSE.  BLOOD PRESSURE RESPONSE: APPROPRIATE. STRESS EKGs: NO CHANGES SEEN. CHEST DISCOMFORT: NO PAIN DURING STRESS. NO PAIN DURING RECOVERY. ISCHEMIC EKG CHANGES: NONE.  EKG IMPRESSION: NEGATIVE ELECTROCARDIOGRAPHICALLY LEXISCAN STRESS TEST. NUCLEAR SCAN TO FOLLOW.       [facility]    D: 2021 10:59:28       T: 2021 11:03:50     VLADISLAV/MARIBEL  Job#: [id number]     Doc#: Unknown    CC:    (Retain this field even if not dictated or not decipherable)

## 2021-08-03 RX ORDER — SODIUM CHLORIDE 0.9 % (FLUSH) 0.9 %
10 SYRINGE (ML) INJECTION ONCE
Status: COMPLETED | OUTPATIENT
Start: 2021-07-21 | End: 2021-08-03

## 2021-08-03 RX ADMIN — TETRAKIS(2-METHOXYISOBUTYLISOCYANIDE)COPPER(I) TETRAFLUOROBORATE 43.6 MILLICURIE: 1 INJECTION, POWDER, LYOPHILIZED, FOR SOLUTION INTRAVENOUS at 09:00

## 2021-08-03 RX ADMIN — SODIUM CHLORIDE, PRESERVATIVE FREE 10 ML: 5 INJECTION INTRAVENOUS at 09:00

## 2021-08-16 ENCOUNTER — OFFICE VISIT (OUTPATIENT)
Dept: FAMILY MEDICINE CLINIC | Age: 56
End: 2021-08-16
Payer: COMMERCIAL

## 2021-08-16 VITALS
RESPIRATION RATE: 16 BRPM | OXYGEN SATURATION: 94 % | HEART RATE: 102 BPM | SYSTOLIC BLOOD PRESSURE: 140 MMHG | BODY MASS INDEX: 37.94 KG/M2 | TEMPERATURE: 96.6 F | WEIGHT: 272 LBS | DIASTOLIC BLOOD PRESSURE: 84 MMHG

## 2021-08-16 DIAGNOSIS — G47.9 RESTLESS SLEEPER: ICD-10-CM

## 2021-08-16 DIAGNOSIS — R06.02 SOB (SHORTNESS OF BREATH): ICD-10-CM

## 2021-08-16 DIAGNOSIS — I10 ESSENTIAL HYPERTENSION: ICD-10-CM

## 2021-08-16 DIAGNOSIS — E66.01 CLASS 2 SEVERE OBESITY DUE TO EXCESS CALORIES WITH SERIOUS COMORBIDITY AND BODY MASS INDEX (BMI) OF 38.0 TO 38.9 IN ADULT (HCC): ICD-10-CM

## 2021-08-16 DIAGNOSIS — E11.9 TYPE 2 DIABETES MELLITUS WITHOUT COMPLICATION, WITHOUT LONG-TERM CURRENT USE OF INSULIN (HCC): Primary | ICD-10-CM

## 2021-08-16 PROBLEM — M25.511 ACUTE PAIN OF RIGHT SHOULDER: Status: RESOLVED | Noted: 2019-06-06 | Resolved: 2021-08-16

## 2021-08-16 PROBLEM — D58.2 ELEVATED HEMOGLOBIN (HCC): Status: RESOLVED | Noted: 2019-06-28 | Resolved: 2021-08-16

## 2021-08-16 PROCEDURE — 3052F HG A1C>EQUAL 8.0%<EQUAL 9.0%: CPT | Performed by: NURSE PRACTITIONER

## 2021-08-16 PROCEDURE — 99214 OFFICE O/P EST MOD 30 MIN: CPT | Performed by: NURSE PRACTITIONER

## 2021-08-16 RX ORDER — SITAGLIPTIN AND METFORMIN HYDROCHLORIDE 1000; 50 MG/1; MG/1
1 TABLET, FILM COATED ORAL 2 TIMES DAILY WITH MEALS
Qty: 60 TABLET | Refills: 1 | Status: SHIPPED | OUTPATIENT
Start: 2021-08-16 | End: 2021-12-07 | Stop reason: SDUPTHER

## 2021-08-16 ASSESSMENT — ENCOUNTER SYMPTOMS
BACK PAIN: 0
COUGH: 0
DIARRHEA: 0
CONSTIPATION: 0
RHINORRHEA: 0
CHEST TIGHTNESS: 0
SORE THROAT: 0
ABDOMINAL PAIN: 0
NAUSEA: 0
COLOR CHANGE: 0
ABDOMINAL DISTENTION: 0
SHORTNESS OF BREATH: 1

## 2021-08-16 NOTE — PROGRESS NOTES
Jesus Peres, APRN-CNP    [facility]  [id number]  [place] [address]  [address] [address]  Dept: [phone]  Dept Fax: [phone]     Patient ID: Ana Alvarez is a 55 y.o. male Established patient    HPI    Pt here today for f/u on chronic medical problems, go over labs and/or diagnostic studies, and medication refills. Pt denies any fever or chills. Pt today denies any HA, chest pain, or SOB. Pt denies any N/V/D/C or abdominal pain. - he states he is still having SOB at time with exertion and hasn't noticed the dizziness as much but he has tried to limit his activity that was causing it in the past.     Otherwise pt doing well on current tx and no other concerns today. The patient's past medical, surgical, social, and family history as well as his current medications and allergies were reviewed as documented in today's encounter TRENT Moura. Previous office notes, labs, imaging and hospital records were reviewed prior to and during encounter.     Current Outpatient Medications on File Prior to Visit   Medication Sig Dispense Refill    metFORMIN (GLUCOPHAGE) 1000 MG tablet Take 1 tablet by mouth 2 times daily (with meals) 180 tablet 3    omeprazole (PRILOSEC) 40 MG delayed release capsule Take 1 capsule by mouth Daily 30 capsule 3    atorvastatin (LIPITOR) 10 MG tablet Take 1 tablet by mouth nightly 90 tablet 1    hydroCHLOROthiazide (HYDRODIURIL) 25 MG tablet Take 1 tablet by mouth daily 90 tablet 1    sertraline (ZOLOFT) 50 MG tablet Take 1.5 tablets by mouth daily 135 tablet 0    metoprolol tartrate (LOPRESSOR) 50 MG tablet Take 1 tablet by mouth once daily 90 tablet 0    EQ ASPIRIN ADULT LOW DOSE 81 MG EC tablet Take 1 tablet by mouth once daily 90 tablet 0    losartan (COZAAR) 100 MG tablet Take 1 tablet by mouth once daily 90 tablet 1    ibuprofen (ADVIL;MOTRIN) 800 MG tablet Take 1 tablet by mouth every 6 hours as needed for Pain 90 tablet 1    fluticasone (FLONASE) 50 MCG/ACT nasal spray 1 spray by Each Nostril route daily 2 Bottle 1     No current facility-administered medications on file prior to visit. Subjective:     Review of Systems   Constitutional: Negative for activity change, fatigue and fever. HENT: Negative for congestion, ear pain, rhinorrhea and sore throat. Respiratory: Positive for shortness of breath. Negative for cough and chest tightness. Cardiovascular: Negative for chest pain and palpitations. Gastrointestinal: Negative for abdominal distention, abdominal pain, constipation, diarrhea and nausea. Endocrine: Negative for polydipsia, polyphagia and polyuria. Genitourinary: Negative for difficulty urinating and dysuria. Musculoskeletal: Negative for arthralgias, back pain and myalgias. Skin: Negative for color change and rash. Neurological: Positive for dizziness and headaches (around lunch time). Negative for weakness and light-headedness. Hematological: Negative for adenopathy. Psychiatric/Behavioral: Negative for agitation and behavioral problems. The patient is not nervous/anxious. Vitals:    08/16/21 0736   BP: (!) 144/84   Pulse: 102   Resp: 16   Temp: 96.6 °F (35.9 °C)   SpO2: 94%     Objective:     Physical Exam  Vitals reviewed. Constitutional:       General: He is not in acute distress. Appearance: Normal appearance. HENT:      Head: Normocephalic and atraumatic. Right Ear: External ear normal.      Left Ear: External ear normal.      Nose: Nose normal.      Mouth/Throat:      Mouth: Mucous membranes are moist.      Pharynx: No oropharyngeal exudate or posterior oropharyngeal erythema. Eyes:      Extraocular Movements: Extraocular movements intact. Conjunctiva/sclera: Conjunctivae normal.      Pupils: Pupils are equal, round, and reactive to light. Cardiovascular:      Rate and Rhythm: Normal rate and regular rhythm.       Pulses: Normal pulses. Carotid pulses are 2+ on the right side and 2+ on the left side. Heart sounds: Normal heart sounds. No murmur heard. Pulmonary:      Effort: Pulmonary effort is normal. No respiratory distress. Breath sounds: Normal breath sounds. No wheezing or rales. Abdominal:      General: Bowel sounds are normal. There is no distension. Palpations: Abdomen is soft. Tenderness: There is no abdominal tenderness. Musculoskeletal:         General: Normal range of motion. Cervical back: Normal range of motion. Right lower leg: No edema. Left lower leg: No edema. Lymphadenopathy:      Cervical: No cervical adenopathy. Skin:     General: Skin is warm and dry. Neurological:      General: No focal deficit present. Mental Status: He is alert and oriented to person, place, and time. Deep Tendon Reflexes: Reflexes normal.   Psychiatric:         Mood and Affect: Mood normal.         Behavior: Behavior normal. Behavior is cooperative. Assessment:      Diagnosis Orders   1. Type 2 diabetes mellitus without complication, without long-term current use of insulin (HCC)  sitaGLIPtan-metFORMIN (JANUMET)  MG per tablet   2. Restless sleeper  Baseline Diagnostic Sleep Study   3. SOB (shortness of breath)  ECHO Complete 2D W Doppler W Color   4. Essential hypertension     5. Class 2 severe obesity due to excess calories with serious comorbidity and body mass index (BMI) of 38.0 to 38.9 in adult [facility])       Plan:     Type 2 diabetes mellitus without complication, without long-term current use of insulin (HCC)  - Stable: Medication re-filled as needed, con't medications as prescribed, con't current tx plan  - most recent HGBA1C elevated at 8.0.  - will start Janumet BID and D/C Metformin.  Discussed new medication and side effects.  - diabetic foot exam and microalbumin completed today in office.   - Cont with daily foot exams and yearly eye exams  - Diabetes education discussed and material provided. - recheck in office 3 months. Restless sleeper  - order placed for baseline sleep study to R/O NEW.     shortness of breath  - will get echo to further evaluate. - EKG at last visit was NSR and stress test was negative     Essential hypertension  - Stable: Medication re-filled as needed, con't medications as prescribed, con't current tx plan  - Continue HCTZ, cozaar and lopressor as previously prescribed  - Continue aspirin as previously prescribed  - BP elevated today, pt did state he had not taken his medication yet today. - Education provided on maintaining a low sodium diet and routine exercise. - Advised to seek emergent tx if SBP>180 and/or DBP>110, any chest pain, h/a or vision changes    - pt verbalized understanding plan of care. Medications, labs, diagnostic studies, consultations and follow-up as documented in this encounter. Rest of systems unchanged, continue current treatments    On this date 8/16/2021 I have spent 35 minutes reviewing previous notes, test results and face to face with the patient discussing the diagnosis and importance of compliance with the treatment plan as well as documenting on the day of the visit.     Gumaro Sterling, APRN-CNP

## 2021-08-23 ENCOUNTER — HOSPITAL ENCOUNTER (OUTPATIENT)
Dept: NON INVASIVE DIAGNOSTICS | Age: 56
Discharge: HOME | End: 2021-08-25
Payer: COMMERCIAL

## 2021-08-23 ENCOUNTER — HOSPITAL ENCOUNTER (OUTPATIENT)
Dept: CT IMAGING | Age: 56
Discharge: HOME | End: 2021-08-25
Payer: COMMERCIAL

## 2021-08-23 DIAGNOSIS — R06.02 SOB (SHORTNESS OF BREATH): ICD-10-CM

## 2021-08-23 DIAGNOSIS — Z87.891 PERSONAL HISTORY OF TOBACCO USE: ICD-10-CM

## 2021-08-23 LAB
LV EF: 55 %
LVEF MODALITY: NORMAL

## 2021-08-23 PROCEDURE — 71271 CT THORAX LUNG CANCER SCR C-: CPT

## 2021-08-23 PROCEDURE — 93306 TTE W/DOPPLER COMPLETE: CPT

## 2021-10-13 DIAGNOSIS — M51.37 DEGENERATIVE DISC DISEASE AT L5-S1 LEVEL: ICD-10-CM

## 2021-10-13 DIAGNOSIS — I10 ESSENTIAL HYPERTENSION: ICD-10-CM

## 2021-10-13 RX ORDER — IBUPROFEN 800 MG/1
800 TABLET ORAL EVERY 6 HOURS PRN
Qty: 90 TABLET | Refills: 1 | Status: SHIPPED | OUTPATIENT
Start: 2021-10-13 | End: 2021-12-07 | Stop reason: SDUPTHER

## 2021-10-13 RX ORDER — LOSARTAN POTASSIUM 100 MG/1
100 TABLET ORAL DAILY
Qty: 90 TABLET | Refills: 1 | Status: SHIPPED | OUTPATIENT
Start: 2021-10-13 | End: 2021-12-07 | Stop reason: SDUPTHER

## 2021-12-07 DIAGNOSIS — D58.2 ELEVATED HEMOGLOBIN (HCC): ICD-10-CM

## 2021-12-07 DIAGNOSIS — F10.10 ALCOHOL USE DISORDER, MILD, ABUSE: ICD-10-CM

## 2021-12-07 DIAGNOSIS — E78.00 PURE HYPERCHOLESTEROLEMIA: ICD-10-CM

## 2021-12-07 DIAGNOSIS — M51.37 DEGENERATIVE DISC DISEASE AT L5-S1 LEVEL: ICD-10-CM

## 2021-12-07 DIAGNOSIS — F41.9 ANXIETY AND DEPRESSION: ICD-10-CM

## 2021-12-07 DIAGNOSIS — I10 ESSENTIAL HYPERTENSION: ICD-10-CM

## 2021-12-07 DIAGNOSIS — K21.9 GASTRIC REFLUX: ICD-10-CM

## 2021-12-07 DIAGNOSIS — F32.A ANXIETY AND DEPRESSION: ICD-10-CM

## 2021-12-07 DIAGNOSIS — E11.9 TYPE 2 DIABETES MELLITUS WITHOUT COMPLICATION, WITHOUT LONG-TERM CURRENT USE OF INSULIN (HCC): ICD-10-CM

## 2021-12-07 RX ORDER — ATORVASTATIN CALCIUM 10 MG/1
10 TABLET, FILM COATED ORAL NIGHTLY
Qty: 90 TABLET | Refills: 1 | Status: SHIPPED | OUTPATIENT
Start: 2021-12-07 | End: 2022-06-08

## 2021-12-07 RX ORDER — HYDROCHLOROTHIAZIDE 25 MG/1
25 TABLET ORAL DAILY
Qty: 90 TABLET | Refills: 1 | Status: SHIPPED | OUTPATIENT
Start: 2021-12-07 | End: 2022-05-25

## 2021-12-07 RX ORDER — OMEPRAZOLE 40 MG/1
40 CAPSULE, DELAYED RELEASE ORAL DAILY
Qty: 30 CAPSULE | Refills: 3 | Status: SHIPPED | OUTPATIENT
Start: 2021-12-07 | End: 2022-04-02 | Stop reason: SDUPTHER

## 2021-12-07 RX ORDER — METOPROLOL TARTRATE 50 MG/1
50 TABLET, FILM COATED ORAL DAILY
Qty: 90 TABLET | Refills: 3 | Status: SHIPPED | OUTPATIENT
Start: 2021-12-07

## 2021-12-07 RX ORDER — SITAGLIPTIN AND METFORMIN HYDROCHLORIDE 1000; 50 MG/1; MG/1
1 TABLET, FILM COATED ORAL 2 TIMES DAILY WITH MEALS
Qty: 60 TABLET | Refills: 1 | Status: SHIPPED | OUTPATIENT
Start: 2021-12-07 | End: 2022-04-13

## 2021-12-07 RX ORDER — ASPIRIN 81 MG/1
81 TABLET ORAL DAILY
Qty: 90 TABLET | Refills: 3 | Status: SHIPPED | OUTPATIENT
Start: 2021-12-07

## 2021-12-07 RX ORDER — FLUTICASONE PROPIONATE 50 MCG
1 SPRAY, SUSPENSION (ML) NASAL DAILY
Qty: 3 EACH | Refills: 3 | Status: SHIPPED | OUTPATIENT
Start: 2021-12-07

## 2021-12-07 RX ORDER — LOSARTAN POTASSIUM 100 MG/1
100 TABLET ORAL DAILY
Qty: 90 TABLET | Refills: 1 | Status: SHIPPED | OUTPATIENT
Start: 2021-12-07 | End: 2022-08-15 | Stop reason: SDUPTHER

## 2021-12-07 RX ORDER — IBUPROFEN 800 MG/1
800 TABLET ORAL EVERY 6 HOURS PRN
Qty: 90 TABLET | Refills: 1 | Status: SHIPPED | OUTPATIENT
Start: 2021-12-07 | End: 2022-12-07

## 2021-12-22 NOTE — TELEPHONE ENCOUNTER
(RT) JANE @ [facility] 4/6/20 1:00pm **STOP BLOOD THINNERES 3/30/30**   PAT same day             Spoke with patient, procedure info emailed yes

## 2022-04-02 DIAGNOSIS — K21.9 GASTRIC REFLUX: ICD-10-CM

## 2022-04-04 RX ORDER — OMEPRAZOLE 40 MG/1
40 CAPSULE, DELAYED RELEASE ORAL DAILY
Qty: 30 CAPSULE | Refills: 3 | Status: SHIPPED | OUTPATIENT
Start: 2022-04-04 | End: 2022-10-17

## 2022-04-04 NOTE — TELEPHONE ENCOUNTER
Please Approve or Refuse.   Send to Pharmacy per Pt's Request:      Next Visit Date:  Visit date not found   Last Visit Date: 8/16/2021    Hemoglobin A1C (%)   Date Value   07/15/2021 8.0 (H)   01/12/2021 7.0   01/17/2020 6.3 (H)             ( goal A1C is < 7)   BP Readings from Last 3 Encounters:   08/16/21 (!) 140/84   07/21/21 (!) 148/108   07/15/21 134/84          (goal 120/80)  BUN   Date Value Ref Range Status   07/15/2021 12 6 - 20 mg/dL Final     CREATININE   Date Value Ref Range Status   07/15/2021 0.84 0.70 - 1.20 mg/dL Final     Potassium   Date Value Ref Range Status   07/15/2021 4.3 3.7 - 5.3 mmol/L Final

## 2022-04-11 ASSESSMENT — ENCOUNTER SYMPTOMS
RHINORRHEA: 0
ABDOMINAL PAIN: 0
BACK PAIN: 0
NAUSEA: 0
COLOR CHANGE: 0
CONSTIPATION: 0
ABDOMINAL DISTENTION: 0
COUGH: 0
SORE THROAT: 0
CHEST TIGHTNESS: 0
DIARRHEA: 0

## 2022-04-11 NOTE — PROGRESS NOTES
Jesus Peres, APRN-CNP    [address] [facility]  [id number]  [address], [address]  [address] [address]  Dept: [phone]  Dept Fax: [phone]     Patient ID: Ana Alvarez is a 65 y.o. male Established patient    HPI    Pt here today for f/u on chronic medical problems, go over labs and/or diagnostic studies, and medication refills. Pt denies any fever or chills. Pt today denies any HA, chest pain, or SOB. Pt denies any N/V/D/C or abdominal pain. - just taking metformin due to cost of janumet was unable to afford    - quit drinking about 14 days ago and did have a rough time, but is doing much better now. The hydroxyzine did help with the detox and irritability.     - dizziness, near syncope when on treadmill and get going, it happens when playing with the grand kids. He is afraid to go back to the gym because he feels like the symptoms come on when his heart rate goes up. Cardiac workup was done. - sometimes gets burning sensation to abdomen where his hernia was repaired, it happens mostly with picking up heavy objects. Otherwise pt doing well on current tx and no other concerns today. The patient's past medical, surgical, social, and family history as well as his current medications and allergies were reviewed as documented in today's encounter TRENT Moura. Previous office notes, labs, imaging and hospital records were reviewed prior to and during encounter.     Current Outpatient Medications on File Prior to Visit   Medication Sig Dispense Refill    hydrOXYzine (ATARAX) 25 MG tablet Take 1 tablet by mouth every 8 hours as needed for Anxiety 21 tablet 0    omeprazole (PRILOSEC) 40 MG delayed release capsule Take 1 capsule by mouth Daily 30 capsule 3    fluticasone (FLONASE) 50 MCG/ACT nasal spray 1 spray by Each Nostril route daily 3 each 3    metoprolol tartrate (LOPRESSOR) 50 MG tablet Take 1 tablet by mouth daily 90 tablet 3    aspirin (EQ ASPIRIN ADULT LOW DOSE) 81 MG EC tablet Take 1 tablet by mouth daily 90 tablet 3    atorvastatin (LIPITOR) 10 MG tablet Take 1 tablet by mouth nightly 90 tablet 1    hydroCHLOROthiazide (HYDRODIURIL) 25 MG tablet Take 1 tablet by mouth daily 90 tablet 1    sertraline (ZOLOFT) 50 MG tablet Take 1.5 tablets by mouth daily 135 tablet 0    sitaGLIPtan-metFORMIN (JANUMET)  MG per tablet Take 1 tablet by mouth 2 times daily (with meals) 60 tablet 1    ibuprofen (ADVIL;MOTRIN) 800 MG tablet Take 1 tablet by mouth every 6 hours as needed for Pain 90 tablet 1    losartan (COZAAR) 100 MG tablet Take 1 tablet by mouth daily 90 tablet 1     No current facility-administered medications on file prior to visit. Subjective:     Review of Systems   Constitutional: Negative for activity change, fatigue and fever. HENT: Negative for congestion, ear pain, rhinorrhea and sore throat. Respiratory: Negative for cough, chest tightness and shortness of breath. Cardiovascular: Negative for chest pain and palpitations. Gastrointestinal: Negative for abdominal distention, abdominal pain, constipation, diarrhea and nausea. Endocrine: Negative for polydipsia, polyphagia and polyuria. Genitourinary: Negative for difficulty urinating and dysuria. Musculoskeletal: Negative for arthralgias, back pain and myalgias. Skin: Negative for color change and rash. Neurological: Positive for dizziness (when heart rate goes up), syncope (near syncope) and headaches (around lunch time). Negative for weakness and light-headedness. Hematological: Negative for adenopathy. Psychiatric/Behavioral: Negative for agitation and behavioral problems. The patient is not nervous/anxious. Vitals:    04/13/22 0918   BP: 122/78   Pulse: 69   Resp: 16   Temp: 97.7 °F (36.5 °C)   SpO2: 96%     Objective:     Physical Exam  Vitals reviewed. Constitutional:       General: He is not in acute distress.      Appearance: hemoglobin (Hb A1C)   4. Class 2 severe obesity due to excess calories with serious comorbidity and body mass index (BMI) of 38.0 to 38.9 in adult ([address]     5. Anxiety and depression     6. Pure hypercholesterolemia  Lipid Panel   7. Alcohol use disorder, mild, abuse     8. Elevated ALT measurement  Comprehensive Metabolic Panel   9. Screening PSA (prostate specific antigen)  PSA Screening   10. Vitamin D deficiency  Vitamin D 25 Hydroxy   11. Dizziness  US CAROTID ARTERY BILATERAL   12. Near syncope  US CAROTID ARTERY BILATERAL     Plan:     - discussed the importance of keeping follow up appointments and getting labs completed, pt verbalized understanding. Type 2 diabetes mellitus without complication, without long-term current use of insulin (HCC)  - Stable: Medication re-filled as needed, con't medications as prescribed, con't current tx plan  - most recent HGBA1C elevated at 6.7  - continue metformin as prescribed. - Cont with daily foot exams and yearly eye exams  - Diabetes education discussed and material provided. - recheck in office 3 months. Restless sleeper  - has not completed baseline sleep study to R/O NEW. Essential hypertension  - Stable: Medication re-filled as needed, con't medications as prescribed, con't current tx plan  - Continue HCTZ, cozaar and lopressor as previously prescribed  - Continue aspirin as previously prescribed  - BP elevated today, pt did state he had not taken his medication yet today. - Education provided on maintaining a low sodium diet and routine exercise. - Advised to seek emergent tx if SBP>180 and/or DBP>110, any chest pain, h/a or vision changes    Class 2 severe obesity due to excess calories with serious comorbidity and body mass index (BMI) of 38.0 to 38.9 in adult [facility])  - encouraged lifestyle modifications and increase exercise.      Anxiety and depression  - Stable: Medication re-filled as needed, con't medications as prescribed, con't current tx plan  - Continue with zoloft as previously prescribed. - Pt encouraged to deep breath and relax through anxious moments   - Offered reassurance and allowed to ventilate feelings and ask questions.   - Non-pharmological coping methods discussed. Pure hypercholesterolemia  - Stable: Medication re-filled as needed, con't medications as prescribed, con't current tx plan  - Lifestyle changes: Decrease fats, sugars, carbohydrates, and increase routine exercise, try to get 150 minutes of aerobic activity a week and watch calorie portions and to limit her carbs in her diet. - Foods that helps increase HDL include the following: Fish, nuts, flax, avocados, and legumes (such as soy, kidney beans, chickpeas). - Will cont with low fat/chol diet and lipitor as ordered. Alcohol use disorder, mild, abuse  - discussed treatment options. Elevated ALT measurement  Screening PSA (prostate specific antigen)  Vitamin D deficiency  - will get labs to further evaluate. Dizziness  Near syncope  - cardiac workup negative, will get carotid ultrasound to further workup symptoms. Return in about 3 months (around 7/13/2022) for 3 month follow up . - pt verbalized understanding plan of care. Medications, labs, diagnostic studies, consultations and follow-up as documented in this encounter. Rest of systems unchanged, continue current treatments    On this date 4/13/2022 I have spent 40 minutes reviewing previous notes, test results and face to face with the patient discussing the diagnosis and importance of compliance with the treatment plan as well as documenting on the day of the visit.     Gumaro Sterling, APRN-CNP

## 2022-04-13 ENCOUNTER — HOSPITAL ENCOUNTER (OUTPATIENT)
Age: 57
Discharge: HOME | End: 2022-04-13
Payer: COMMERCIAL

## 2022-04-13 ENCOUNTER — OFFICE VISIT (OUTPATIENT)
Dept: FAMILY MEDICINE CLINIC | Age: 57
End: 2022-04-13
Payer: COMMERCIAL

## 2022-04-13 VITALS
DIASTOLIC BLOOD PRESSURE: 78 MMHG | HEART RATE: 69 BPM | OXYGEN SATURATION: 96 % | WEIGHT: 268 LBS | BODY MASS INDEX: 37.38 KG/M2 | SYSTOLIC BLOOD PRESSURE: 122 MMHG | TEMPERATURE: 97.7 F | RESPIRATION RATE: 16 BRPM

## 2022-04-13 DIAGNOSIS — F32.A ANXIETY AND DEPRESSION: ICD-10-CM

## 2022-04-13 DIAGNOSIS — E66.01 CLASS 2 SEVERE OBESITY DUE TO EXCESS CALORIES WITH SERIOUS COMORBIDITY AND BODY MASS INDEX (BMI) OF 38.0 TO 38.9 IN ADULT (HCC): ICD-10-CM

## 2022-04-13 DIAGNOSIS — R74.01 ELEVATED ALT MEASUREMENT: ICD-10-CM

## 2022-04-13 DIAGNOSIS — E78.00 PURE HYPERCHOLESTEROLEMIA: ICD-10-CM

## 2022-04-13 DIAGNOSIS — Z12.5 SCREENING PSA (PROSTATE SPECIFIC ANTIGEN): ICD-10-CM

## 2022-04-13 DIAGNOSIS — I10 ESSENTIAL HYPERTENSION: ICD-10-CM

## 2022-04-13 DIAGNOSIS — D58.2 ELEVATED HEMOGLOBIN (HCC): ICD-10-CM

## 2022-04-13 DIAGNOSIS — E11.9 TYPE 2 DIABETES MELLITUS WITHOUT COMPLICATION, WITHOUT LONG-TERM CURRENT USE OF INSULIN (HCC): ICD-10-CM

## 2022-04-13 DIAGNOSIS — E55.9 VITAMIN D DEFICIENCY: ICD-10-CM

## 2022-04-13 DIAGNOSIS — R55 NEAR SYNCOPE: ICD-10-CM

## 2022-04-13 DIAGNOSIS — K21.9 GASTROESOPHAGEAL REFLUX DISEASE WITHOUT ESOPHAGITIS: ICD-10-CM

## 2022-04-13 DIAGNOSIS — R42 DIZZINESS: ICD-10-CM

## 2022-04-13 DIAGNOSIS — I10 ESSENTIAL HYPERTENSION: Primary | ICD-10-CM

## 2022-04-13 DIAGNOSIS — F10.10 ALCOHOL USE DISORDER, MILD, ABUSE: ICD-10-CM

## 2022-04-13 DIAGNOSIS — F41.9 ANXIETY AND DEPRESSION: ICD-10-CM

## 2022-04-13 PROBLEM — N20.0 KIDNEY STONES: Status: RESOLVED | Noted: 2019-07-10 | Resolved: 2022-04-13

## 2022-04-13 PROBLEM — M75.21 RIGHT BICIPITAL TENOSYNOVITIS: Status: RESOLVED | Noted: 2019-12-03 | Resolved: 2022-04-13

## 2022-04-13 PROBLEM — M75.101 TEAR OF RIGHT SUPRASPINATUS TENDON: Status: RESOLVED | Noted: 2019-11-07 | Resolved: 2022-04-13

## 2022-04-13 PROBLEM — M75.121 COMPLETE TEAR OF RIGHT ROTATOR CUFF: Status: RESOLVED | Noted: 2019-12-03 | Resolved: 2022-04-13

## 2022-04-13 LAB
ALBUMIN SERPL-MCNC: 4.4 G/DL (ref 3.5–5.2)
ALBUMIN/GLOBULIN RATIO: 1.4 (ref 1–2.5)
ALP BLD-CCNC: 99 U/L (ref 40–129)
ALT SERPL-CCNC: 67 U/L (ref 5–41)
ANION GAP SERPL CALCULATED.3IONS-SCNC: 14 MMOL/L (ref 9–17)
AST SERPL-CCNC: 48 U/L
BILIRUB SERPL-MCNC: 0.43 MG/DL (ref 0.3–1.2)
BUN BLDV-MCNC: 15 MG/DL (ref 6–20)
CALCIUM SERPL-MCNC: 10 MG/DL (ref 8.6–10.4)
CHLORIDE BLD-SCNC: 100 MMOL/L (ref 98–107)
CHOLESTEROL/HDL RATIO: 3.7
CHOLESTEROL: 160 MG/DL
CO2: 28 MMOL/L (ref 20–31)
CREAT SERPL-MCNC: 0.98 MG/DL (ref 0.7–1.2)
CREATININE URINE POCT: 50
GFR AFRICAN AMERICAN: >60 ML/MIN
GFR NON-AFRICAN AMERICAN: >60 ML/MIN
GFR SERPL CREATININE-BSD FRML MDRD: ABNORMAL ML/MIN/{1.73_M2}
GLUCOSE BLD-MCNC: 119 MG/DL (ref 70–99)
HBA1C MFR BLD: 6.7 %
HCT VFR BLD CALC: 53.6 % (ref 40.7–50.3)
HDLC SERPL-MCNC: 43 MG/DL
HEMOGLOBIN: 17.7 G/DL (ref 13–17)
LDL CHOLESTEROL: 92 MG/DL (ref 0–130)
MCH RBC QN AUTO: 32.5 PG (ref 25.2–33.5)
MCHC RBC AUTO-ENTMCNC: 33 G/DL (ref 28.4–34.8)
MCV RBC AUTO: 98.5 FL (ref 82.6–102.9)
MICROALBUMIN/CREAT 24H UR: 10 MG/G{CREAT}
MICROALBUMIN/CREAT UR-RTO: <30
NRBC AUTOMATED: 0 PER 100 WBC
PDW BLD-RTO: 11.9 % (ref 11.8–14.4)
PLATELET # BLD: 303 K/UL (ref 138–453)
PMV BLD AUTO: 11.5 FL (ref 8.1–13.5)
POTASSIUM SERPL-SCNC: 4.2 MMOL/L (ref 3.7–5.3)
PROSTATE SPECIFIC ANTIGEN: 1.3 UG/L
RBC # BLD: 5.44 M/UL (ref 4.21–5.77)
SODIUM BLD-SCNC: 142 MMOL/L (ref 135–144)
TOTAL PROTEIN: 7.5 G/DL (ref 6.4–8.3)
TRIGL SERPL-MCNC: 124 MG/DL
VITAMIN D 25-HYDROXY: 27.2 NG/ML
WBC # BLD: 11 K/UL (ref 3.5–11.3)

## 2022-04-13 PROCEDURE — 80053 COMPREHEN METABOLIC PANEL: CPT

## 2022-04-13 PROCEDURE — 82044 UR ALBUMIN SEMIQUANTITATIVE: CPT | Performed by: NURSE PRACTITIONER

## 2022-04-13 PROCEDURE — G0103 PSA SCREENING: HCPCS

## 2022-04-13 PROCEDURE — 36415 COLL VENOUS BLD VENIPUNCTURE: CPT

## 2022-04-13 PROCEDURE — 80061 LIPID PANEL: CPT

## 2022-04-13 PROCEDURE — 83036 HEMOGLOBIN GLYCOSYLATED A1C: CPT | Performed by: NURSE PRACTITIONER

## 2022-04-13 PROCEDURE — 85027 COMPLETE CBC AUTOMATED: CPT

## 2022-04-13 PROCEDURE — 99215 OFFICE O/P EST HI 40 MIN: CPT | Performed by: NURSE PRACTITIONER

## 2022-04-13 PROCEDURE — 82306 VITAMIN D 25 HYDROXY: CPT

## 2022-04-13 PROCEDURE — 3044F HG A1C LEVEL LT 7.0%: CPT | Performed by: NURSE PRACTITIONER

## 2022-04-13 ASSESSMENT — PATIENT HEALTH QUESTIONNAIRE - PHQ9
SUM OF ALL RESPONSES TO PHQ QUESTIONS 1-9: 0
SUM OF ALL RESPONSES TO PHQ9 QUESTIONS 1 & 2: 0
2. FEELING DOWN, DEPRESSED OR HOPELESS: 0
SUM OF ALL RESPONSES TO PHQ QUESTIONS 1-9: 0
1. LITTLE INTEREST OR PLEASURE IN DOING THINGS: 0
SUM OF ALL RESPONSES TO PHQ QUESTIONS 1-9: 0
SUM OF ALL RESPONSES TO PHQ QUESTIONS 1-9: 0

## 2022-04-13 ASSESSMENT — ENCOUNTER SYMPTOMS: SHORTNESS OF BREATH: 0

## 2022-04-19 ENCOUNTER — HOSPITAL ENCOUNTER (OUTPATIENT)
Dept: VASCULAR LAB | Age: 57
Discharge: HOME | End: 2022-04-19
Payer: COMMERCIAL

## 2022-04-19 DIAGNOSIS — R42 DIZZINESS: ICD-10-CM

## 2022-04-19 DIAGNOSIS — R55 NEAR SYNCOPE: ICD-10-CM

## 2022-04-19 PROCEDURE — 93880 EXTRACRANIAL BILAT STUDY: CPT

## 2022-05-25 DIAGNOSIS — I10 ESSENTIAL HYPERTENSION: ICD-10-CM

## 2022-05-25 RX ORDER — HYDROCHLOROTHIAZIDE 25 MG/1
TABLET ORAL
Qty: 90 TABLET | Refills: 0 | Status: SHIPPED | OUTPATIENT
Start: 2022-05-25 | End: 2022-06-22

## 2022-05-29 DIAGNOSIS — F10.10 ALCOHOL USE DISORDER, MILD, ABUSE: ICD-10-CM

## 2022-05-29 DIAGNOSIS — F41.9 ANXIETY AND DEPRESSION: ICD-10-CM

## 2022-05-29 DIAGNOSIS — F32.A ANXIETY AND DEPRESSION: ICD-10-CM

## 2022-05-31 RX ORDER — HYDROXYZINE HYDROCHLORIDE 25 MG/1
25 TABLET, FILM COATED ORAL EVERY 8 HOURS PRN
Qty: 90 TABLET | Refills: 0 | Status: SHIPPED | OUTPATIENT
Start: 2022-05-31 | End: 2022-06-07

## 2022-06-08 DIAGNOSIS — E78.00 PURE HYPERCHOLESTEROLEMIA: ICD-10-CM

## 2022-06-08 RX ORDER — ATORVASTATIN CALCIUM 10 MG/1
10 TABLET, FILM COATED ORAL NIGHTLY
Qty: 90 TABLET | Refills: 1 | Status: SHIPPED | OUTPATIENT
Start: 2022-06-08

## 2022-06-21 DIAGNOSIS — F41.9 ANXIETY AND DEPRESSION: ICD-10-CM

## 2022-06-21 DIAGNOSIS — F10.10 ALCOHOL USE DISORDER, MILD, ABUSE: ICD-10-CM

## 2022-06-21 DIAGNOSIS — F32.A ANXIETY AND DEPRESSION: ICD-10-CM

## 2022-06-21 DIAGNOSIS — I10 ESSENTIAL HYPERTENSION: ICD-10-CM

## 2022-06-22 RX ORDER — HYDROCHLOROTHIAZIDE 25 MG/1
TABLET ORAL
Qty: 90 TABLET | Refills: 0 | Status: SHIPPED | OUTPATIENT
Start: 2022-06-22 | End: 2022-06-28

## 2022-06-28 DIAGNOSIS — I10 ESSENTIAL HYPERTENSION: ICD-10-CM

## 2022-06-28 DIAGNOSIS — F10.10 ALCOHOL USE DISORDER, MILD, ABUSE: ICD-10-CM

## 2022-06-28 DIAGNOSIS — F32.A ANXIETY AND DEPRESSION: ICD-10-CM

## 2022-06-28 DIAGNOSIS — F41.9 ANXIETY AND DEPRESSION: ICD-10-CM

## 2022-06-28 RX ORDER — HYDROCHLOROTHIAZIDE 25 MG/1
TABLET ORAL
Qty: 90 TABLET | Refills: 0 | Status: SHIPPED | OUTPATIENT
Start: 2022-06-28 | End: 2022-06-30 | Stop reason: SDUPTHER

## 2022-06-30 DIAGNOSIS — F32.A ANXIETY AND DEPRESSION: ICD-10-CM

## 2022-06-30 DIAGNOSIS — F10.10 ALCOHOL USE DISORDER, MILD, ABUSE: ICD-10-CM

## 2022-06-30 DIAGNOSIS — F41.9 ANXIETY AND DEPRESSION: ICD-10-CM

## 2022-06-30 DIAGNOSIS — I10 ESSENTIAL HYPERTENSION: ICD-10-CM

## 2022-06-30 RX ORDER — HYDROCHLOROTHIAZIDE 25 MG/1
25 TABLET ORAL DAILY
Qty: 90 TABLET | Refills: 0 | Status: SHIPPED | OUTPATIENT
Start: 2022-06-30

## 2022-07-09 ENCOUNTER — HOSPITAL ENCOUNTER (OUTPATIENT)
Age: 57
Discharge: HOME | End: 2022-07-09
Payer: COMMERCIAL

## 2022-07-09 ENCOUNTER — HOSPITAL ENCOUNTER (OUTPATIENT)
Age: 57
End: 2022-07-09
Payer: COMMERCIAL

## 2022-07-09 DIAGNOSIS — R74.01 ELEVATED ALT MEASUREMENT: ICD-10-CM

## 2022-07-09 DIAGNOSIS — D58.2 ELEVATED HEMOGLOBIN (HCC): ICD-10-CM

## 2022-07-09 DIAGNOSIS — E55.9 VITAMIN D DEFICIENCY: ICD-10-CM

## 2022-07-09 LAB
ABSOLUTE EOS #: 0.44 K/UL (ref 0–0.44)
ABSOLUTE IMMATURE GRANULOCYTE: <0.03 K/UL (ref 0–0.3)
ABSOLUTE LYMPH #: 3.27 K/UL (ref 1.1–3.7)
ABSOLUTE MONO #: 0.93 K/UL (ref 0.1–1.2)
ALBUMIN SERPL-MCNC: 4 G/DL (ref 3.5–5.2)
ALBUMIN/GLOBULIN RATIO: 1.2 (ref 1–2.5)
ALP BLD-CCNC: 104 U/L (ref 40–129)
ALT SERPL-CCNC: 39 U/L (ref 5–41)
AST SERPL-CCNC: 28 U/L
BASOPHILS # BLD: 1 % (ref 0–2)
BASOPHILS ABSOLUTE: 0.12 K/UL (ref 0–0.2)
BILIRUB SERPL-MCNC: 0.42 MG/DL (ref 0.3–1.2)
BILIRUBIN DIRECT: 0.1 MG/DL
BILIRUBIN, INDIRECT: 0.32 MG/DL (ref 0–1)
EOSINOPHILS RELATIVE PERCENT: 5 % (ref 1–4)
HCT VFR BLD CALC: 45.7 % (ref 40.7–50.3)
HEMOGLOBIN: 15.8 G/DL (ref 13–17)
IMMATURE GRANULOCYTES: 0 %
LYMPHOCYTES # BLD: 39 % (ref 24–43)
MCH RBC QN AUTO: 33 PG (ref 25.2–33.5)
MCHC RBC AUTO-ENTMCNC: 34.6 G/DL (ref 28.4–34.8)
MCV RBC AUTO: 95.4 FL (ref 82.6–102.9)
MONOCYTES # BLD: 11 % (ref 3–12)
NRBC AUTOMATED: 0 PER 100 WBC
PDW BLD-RTO: 13 % (ref 11.8–14.4)
PLATELET # BLD: 229 K/UL (ref 138–453)
PMV BLD AUTO: 11.6 FL (ref 8.1–13.5)
RBC # BLD: 4.79 M/UL (ref 4.21–5.77)
SEG NEUTROPHILS: 44 % (ref 36–65)
SEGMENTED NEUTROPHILS ABSOLUTE COUNT: 3.72 K/UL (ref 1.5–8.1)
TOTAL PROTEIN: 7.3 G/DL (ref 6.4–8.3)
VITAMIN D 25-HYDROXY: 34 NG/ML
WBC # BLD: 8.5 K/UL (ref 3.5–11.3)

## 2022-07-09 PROCEDURE — 80076 HEPATIC FUNCTION PANEL: CPT

## 2022-07-09 PROCEDURE — 82306 VITAMIN D 25 HYDROXY: CPT

## 2022-07-09 PROCEDURE — 36415 COLL VENOUS BLD VENIPUNCTURE: CPT

## 2022-07-09 PROCEDURE — 85025 COMPLETE CBC W/AUTO DIFF WBC: CPT

## 2022-07-18 ENCOUNTER — OFFICE VISIT (OUTPATIENT)
Dept: FAMILY MEDICINE CLINIC | Age: 57
End: 2022-07-18
Payer: COMMERCIAL

## 2022-07-18 VITALS
WEIGHT: 267 LBS | OXYGEN SATURATION: 97 % | DIASTOLIC BLOOD PRESSURE: 84 MMHG | RESPIRATION RATE: 16 BRPM | HEART RATE: 64 BPM | TEMPERATURE: 97 F | HEIGHT: 71 IN | SYSTOLIC BLOOD PRESSURE: 138 MMHG | BODY MASS INDEX: 37.38 KG/M2

## 2022-07-18 DIAGNOSIS — E11.9 TYPE 2 DIABETES MELLITUS WITHOUT COMPLICATION, WITHOUT LONG-TERM CURRENT USE OF INSULIN (HCC): Primary | ICD-10-CM

## 2022-07-18 DIAGNOSIS — L23.7 POISON IVY DERMATITIS: ICD-10-CM

## 2022-07-18 DIAGNOSIS — Z87.891 PERSONAL HISTORY OF TOBACCO USE: ICD-10-CM

## 2022-07-18 DIAGNOSIS — E66.01 CLASS 2 SEVERE OBESITY DUE TO EXCESS CALORIES WITH SERIOUS COMORBIDITY AND BODY MASS INDEX (BMI) OF 38.0 TO 38.9 IN ADULT (HCC): ICD-10-CM

## 2022-07-18 DIAGNOSIS — G47.00 INSOMNIA, UNSPECIFIED TYPE: ICD-10-CM

## 2022-07-18 DIAGNOSIS — I10 ESSENTIAL HYPERTENSION: ICD-10-CM

## 2022-07-18 DIAGNOSIS — F32.A ANXIETY AND DEPRESSION: ICD-10-CM

## 2022-07-18 DIAGNOSIS — E78.00 PURE HYPERCHOLESTEROLEMIA: ICD-10-CM

## 2022-07-18 DIAGNOSIS — F41.9 ANXIETY AND DEPRESSION: ICD-10-CM

## 2022-07-18 DIAGNOSIS — G47.9 RESTLESS SLEEPER: ICD-10-CM

## 2022-07-18 LAB — HBA1C MFR BLD: 7.8 %

## 2022-07-18 PROCEDURE — G0296 VISIT TO DETERM LDCT ELIG: HCPCS | Performed by: NURSE PRACTITIONER

## 2022-07-18 PROCEDURE — 3051F HG A1C>EQUAL 7.0%<8.0%: CPT | Performed by: NURSE PRACTITIONER

## 2022-07-18 PROCEDURE — 83036 HEMOGLOBIN GLYCOSYLATED A1C: CPT | Performed by: NURSE PRACTITIONER

## 2022-07-18 PROCEDURE — 99215 OFFICE O/P EST HI 40 MIN: CPT | Performed by: NURSE PRACTITIONER

## 2022-07-18 RX ORDER — HYDROXYZINE HYDROCHLORIDE 25 MG/1
25 TABLET, FILM COATED ORAL EVERY 8 HOURS PRN
Qty: 60 TABLET | Refills: 2 | Status: SHIPPED | OUTPATIENT
Start: 2022-07-18

## 2022-07-18 RX ORDER — PREDNISONE 20 MG/1
20 TABLET ORAL DAILY
Qty: 7 TABLET | Refills: 0 | Status: SHIPPED | OUTPATIENT
Start: 2022-07-18 | End: 2022-07-25

## 2022-07-18 SDOH — ECONOMIC STABILITY: FOOD INSECURITY: WITHIN THE PAST 12 MONTHS, YOU WORRIED THAT YOUR FOOD WOULD RUN OUT BEFORE YOU GOT MONEY TO BUY MORE.: NEVER TRUE

## 2022-07-18 SDOH — ECONOMIC STABILITY: FOOD INSECURITY: WITHIN THE PAST 12 MONTHS, THE FOOD YOU BOUGHT JUST DIDN'T LAST AND YOU DIDN'T HAVE MONEY TO GET MORE.: NEVER TRUE

## 2022-07-18 ASSESSMENT — PATIENT HEALTH QUESTIONNAIRE - PHQ9
2. FEELING DOWN, DEPRESSED OR HOPELESS: 0
SUM OF ALL RESPONSES TO PHQ QUESTIONS 1-9: 0
1. LITTLE INTEREST OR PLEASURE IN DOING THINGS: 0
4. FEELING TIRED OR HAVING LITTLE ENERGY: 0
SUM OF ALL RESPONSES TO PHQ QUESTIONS 1-9: 0
5. POOR APPETITE OR OVEREATING: 0
SUM OF ALL RESPONSES TO PHQ QUESTIONS 1-9: 0
6. FEELING BAD ABOUT YOURSELF - OR THAT YOU ARE A FAILURE OR HAVE LET YOURSELF OR YOUR FAMILY DOWN: 0
SUM OF ALL RESPONSES TO PHQ9 QUESTIONS 1 & 2: 0
9. THOUGHTS THAT YOU WOULD BE BETTER OFF DEAD, OR OF HURTING YOURSELF: 0
8. MOVING OR SPEAKING SO SLOWLY THAT OTHER PEOPLE COULD HAVE NOTICED. OR THE OPPOSITE, BEING SO FIGETY OR RESTLESS THAT YOU HAVE BEEN MOVING AROUND A LOT MORE THAN USUAL: 0
7. TROUBLE CONCENTRATING ON THINGS, SUCH AS READING THE NEWSPAPER OR WATCHING TELEVISION: 0
10. IF YOU CHECKED OFF ANY PROBLEMS, HOW DIFFICULT HAVE THESE PROBLEMS MADE IT FOR YOU TO DO YOUR WORK, TAKE CARE OF THINGS AT HOME, OR GET ALONG WITH OTHER PEOPLE: 0
3. TROUBLE FALLING OR STAYING ASLEEP: 0
SUM OF ALL RESPONSES TO PHQ QUESTIONS 1-9: 0

## 2022-07-18 ASSESSMENT — ENCOUNTER SYMPTOMS
CONSTIPATION: 0
BACK PAIN: 0
SORE THROAT: 0
COUGH: 0
RHINORRHEA: 0
SHORTNESS OF BREATH: 0
NAUSEA: 0
ABDOMINAL DISTENTION: 0
CHEST TIGHTNESS: 0
DIARRHEA: 0
COLOR CHANGE: 0
ABDOMINAL PAIN: 0

## 2022-07-18 ASSESSMENT — SOCIAL DETERMINANTS OF HEALTH (SDOH): HOW HARD IS IT FOR YOU TO PAY FOR THE VERY BASICS LIKE FOOD, HOUSING, MEDICAL CARE, AND HEATING?: NOT HARD AT ALL

## 2022-07-18 NOTE — PROGRESS NOTES
Kasandra Adan, APRN-[facility]  [id number] [address], [address]  [facility] [address]  Dept: [phone]  Dept Fax: [phone]     Patient ID: Ervin Concepcion is a 65 y.o. male Established patient    HPI    Pt here today for f/u on chronic medical problems, go over labs and/or diagnostic studies, and medication refills. Pt denies any fever or chills. Pt today denies any HA, chest pain, or SOB. Pt denies any N/V/D/C or abdominal pain. - hasn't been drinking and is feeling better. - just taking metformin once daily    -hydroxyzine helps with irritable leg syndrome at night but still tossing and turning, he didn't get the sleep study done last year as ordered. - has rash to both legs from poison ivy exposure. Otherwise pt doing well on current tx and no other concerns today. The patient's past medical, surgical, social, and family history as well as his current medications and allergies were reviewed as documented in today's encounter TRENT Isabel. Previous office notes, labs, imaging and hospital records were reviewed prior to and during encounter.     Current Outpatient Medications on File Prior to Visit   Medication Sig Dispense Refill    hydrOXYzine HCl (ATARAX) 25 MG tablet Take 25 mg by mouth every 8 hours as needed      hydroCHLOROthiazide (HYDRODIURIL) 25 MG tablet Take 1 tablet by mouth daily 90 tablet 0    sertraline (ZOLOFT) 50 MG tablet Take 1 tablet by mouth daily TAKE 1 & 1/2 (ONE & ONE-HALF) TABLETS BY MOUTH ONCE DAILY 135 tablet 0    atorvastatin (LIPITOR) 10 MG tablet Take 1 tablet by mouth nightly 90 tablet 1    metFORMIN (GLUCOPHAGE) 1000 MG tablet Take 1,000 mg by mouth 2 times daily (with meals)      omeprazole (PRILOSEC) 40 MG delayed release capsule Take 1 capsule by mouth Daily 30 capsule 3    fluticasone (FLONASE) 50 MCG/ACT nasal spray 1 spray by Each Nostril route daily 3 each 3    metoprolol tartrate (LOPRESSOR) 50 MG tablet Take 1 tablet by mouth daily 90 tablet 3    aspirin (EQ ASPIRIN ADULT LOW DOSE) 81 MG EC tablet Take 1 tablet by mouth daily 90 tablet 3    ibuprofen (ADVIL;MOTRIN) 800 MG tablet Take 1 tablet by mouth every 6 hours as needed for Pain 90 tablet 1    losartan (COZAAR) 100 MG tablet Take 1 tablet by mouth daily 90 tablet 1     No current facility-administered medications on file prior to visit. Subjective:     Review of Systems   Constitutional:  Negative for activity change, fatigue and fever. HENT:  Negative for congestion, ear pain, rhinorrhea and sore throat. Respiratory:  Negative for cough, chest tightness and shortness of breath. Cardiovascular:  Negative for chest pain and palpitations. Gastrointestinal:  Negative for abdominal distention, abdominal pain, constipation, diarrhea and nausea. Endocrine: Negative for polydipsia, polyphagia and polyuria. Genitourinary:  Negative for difficulty urinating and dysuria. Musculoskeletal:  Negative for arthralgias, back pain and myalgias. Skin:  Negative for color change and rash. Neurological:  Negative for dizziness, syncope, weakness, light-headedness and headaches. Hematological:  Negative for adenopathy. Psychiatric/Behavioral:  Positive for sleep disturbance. Negative for agitation and behavioral problems. The patient is not nervous/anxious. Vitals:    07/18/22 0910   BP: 138/84   Pulse: 64   Resp: 16   Temp: 97 °F (36.1 °C)   SpO2: 97%     Objective:     Physical Exam  Vitals reviewed. Constitutional:       General: He is not in acute distress. Appearance: Normal appearance. HENT:      Head: Normocephalic and atraumatic. Right Ear: External ear normal.      Left Ear: External ear normal.      Nose: Nose normal.      Mouth/Throat:      Mouth: Mucous membranes are moist.      Pharynx: No oropharyngeal exudate or posterior oropharyngeal erythema. Eyes:      Extraocular Movements: Extraocular movements intact. Conjunctiva/sclera: Conjunctivae normal.      Pupils: Pupils are equal, round, and reactive to light. Cardiovascular:      Rate and Rhythm: Normal rate and regular rhythm. Pulses: Normal pulses. Heart sounds: Normal heart sounds. No murmur heard. Pulmonary:      Effort: Pulmonary effort is normal. No respiratory distress. Breath sounds: Normal breath sounds. No wheezing or rales. Abdominal:      General: Bowel sounds are normal. There is no distension. Palpations: Abdomen is soft. Tenderness: There is no abdominal tenderness. Musculoskeletal:         General: Normal range of motion. Cervical back: Normal range of motion. Right lower leg: No edema. Left lower leg: No edema. Lymphadenopathy:      Cervical: No cervical adenopathy. Skin:     General: Skin is warm and dry. Neurological:      General: No focal deficit present. Mental Status: He is alert and oriented to person, place, and time. Deep Tendon Reflexes: Reflexes normal.   Psychiatric:         Attention and Perception: Attention and perception normal.         Mood and Affect: Mood and affect normal. Mood is not anxious (stable) or depressed (stable). Speech: Speech normal.         Behavior: Behavior normal. Behavior is cooperative. Assessment:      Diagnosis Orders   1. Type 2 diabetes mellitus without complication, without long-term current use of insulin ([facility])  POCT glycosylated hemoglobin (Hb A1C)      2. Personal history of tobacco use  VT VISIT TO DISCUSS LUNG CA SCREEN W LDCT    CT Lung Screen (Annual)      3. Poison ivy dermatitis  predniSONE (DELTASONE) 20 MG tablet      4. Restless sleeper        5. Insomnia, unspecified type  Baseline Diagnostic Sleep Study    hydrOXYzine HCl (ATARAX) 25 MG tablet      6. Anxiety and depression  hydrOXYzine HCl (ATARAX) 25 MG tablet      7. Essential hypertension        8.  Class 2 severe obesity due to excess calories with serious comorbidity and body mass index (BMI) of 38.0 to 38.9 in adult ([facility] [place] [address]        9. Pure hypercholesterolemia          Plan:     - discussed the importance of keeping follow up appointments and getting labs completed, pt verbalized understanding. Type 2 diabetes mellitus without complication, without long-term current use of insulin (HCC)  - Stable: Medication re-filled as needed, con't medications as prescribed, con't current tx plan  - most recent HGBA1C elevated at 7.8  - continue metformin twice daily. - Cont with daily foot exams and yearly eye exams  - Diabetes education discussed and material provided. - recheck in office 3 months. Restless sleeper  - has not completed baseline sleep study to R/O NEW. Essential hypertension  - Stable: Medication re-filled as needed, con't medications as prescribed, con't current tx plan  - Continue HCTZ, cozaar and lopressor as previously prescribed  - Continue aspirin as previously prescribed  - BP elevated today, pt did state he had not taken his medication yet today. - Education provided on maintaining a low sodium diet and routine exercise. - Advised to seek emergent tx if SBP>180 and/or DBP>110, any chest pain, h/a or vision changes    Class 2 severe obesity due to excess calories with serious comorbidity and body mass index (BMI) of 38.0 to 38.9 in adult [facility])  - encouraged lifestyle modifications and increase exercise. Anxiety and depression  - Stable: Medication re-filled as needed, con't medications as prescribed, con't current tx plan  - Continue with zoloft as previously prescribed. - Pt encouraged to deep breath and relax through anxious moments   - Offered reassurance and allowed to ventilate feelings and ask questions.   - Non-pharmological coping methods discussed.      Pure hypercholesterolemia  - Stable: Medication re-filled as needed, con't medications as prescribed, con't current tx plan  - Lifestyle changes: Decrease fats, sugars, carbohydrates, and increase routine exercise, try to get 150 minutes of aerobic activity a week and watch calorie portions and to limit her carbs in her diet. - Foods that helps increase HDL include the following: Fish, nuts, flax, avocados, and legumes (such as soy, kidney beans, chickpeas). - Will cont with low fat/chol diet and lipitor as ordered. Vitamin D deficiency  - - Stable: Medication re-filled as needed, con't medications as prescribed, con't current tx plan  - Last Vitamin D 34.0 noted. - Continue Vitamin D supplements as ordered, will continue to follow Vitamin D level  - Please increase foods with Vitamin D, which include cheese, eggs, cereals, yogurt, milk, tofu, any type of beef, salmon or tuna,  spinach, and mushroom. Personal history of tobacco use  - due for annual CT lung screening     Poison ivy dermatitis  - will start prednisone daily for 7 days. - keep area clean and dry. Class 2 severe obesity due to excess calories with serious comorbidity and body mass index (BMI) of 38.0 to 38.9 in adult [facility])    Return in about 3 months (around 10/18/2022). - pt verbalized understanding plan of care. Medications, labs, diagnostic studies, consultations and follow-up as documented in this encounter. Rest of systems unchanged, continue current treatments    On this date 7/18/2022 I have spent 40 minutes reviewing previous notes, test results and face to face with the patient discussing the diagnosis and importance of compliance with the treatment plan as well as documenting on the day of the visit.     ART Santana-CNP     Low Dose CT (LDCT) Lung Screening criteria met:     Age 50-77(Medicare) or 50-80 (USPSTF)   Pack year smoking >20   Still smoking or less than 15 year since quit   No sign or symptoms of lung cancer   > 11 months since last LDCT     Risks and benefits of lung cancer screening with LDCT scans discussed:    Significance of positive screen - False-positive LDCT results often occur. 95% of all positive results do not lead to a diagnosis of cancer. Usually further imaging can resolve most false-positive results; however, some patients may require invasive procedures. Over diagnosis risk - 10% to 12% of screen-detected lung cancer cases are over diagnosed--that is, the cancer would not have been detected in the patient's lifetime without the screening. Need for follow up screens annually to continue lung cancer screening effectiveness     Risks associated with radiation from annual LDCT- Radiation exposure is about the same as for a mammogram, which is about 1/3 of the annual background radiation exposure from everyday life. Starting screening at age 54 is not likely to increase cancer risk from radiation exposure. Patients with comorbidities resulting in life expectancy of < 10 years, or that would preclude treatment of an abnormality identified on CT, should not be screened due to lack of benefit.     To obtain maximal benefit from this screening, smoking cessation and long-term abstinence from smoking is critical

## 2022-07-27 ENCOUNTER — TELEPHONE (OUTPATIENT)
Dept: ONCOLOGY | Age: 57
End: 2022-07-27

## 2022-08-15 DIAGNOSIS — I10 ESSENTIAL HYPERTENSION: ICD-10-CM

## 2022-08-15 RX ORDER — LOSARTAN POTASSIUM 50 MG/1
TABLET ORAL
Qty: 180 TABLET | Refills: 1 | OUTPATIENT
Start: 2022-08-15

## 2022-08-15 RX ORDER — LOSARTAN POTASSIUM 100 MG/1
100 TABLET ORAL DAILY
Qty: 90 TABLET | Refills: 1 | Status: SHIPPED | OUTPATIENT
Start: 2022-08-15

## 2022-08-15 NOTE — TELEPHONE ENCOUNTER
Please Approve or Refuse.   Send to Pharmacy per Pt's Request:      Next Visit Date:  10/18/2022   Last Visit Date: 7/18/2022    Hemoglobin A1C (%)   Date Value   07/18/2022 7.8   04/13/2022 6.7   07/15/2021 8.0 (H)             ( goal A1C is < 7)   BP Readings from Last 3 Encounters:   07/18/22 138/84   04/13/22 122/78   08/16/21 (!) 140/84          (goal 120/80)  BUN   Date Value Ref Range Status   04/13/2022 15 6 - 20 mg/dL Final     Creatinine   Date Value Ref Range Status   04/13/2022 0.98 0.70 - 1.20 mg/dL Final     Potassium   Date Value Ref Range Status   04/13/2022 4.2 3.7 - 5.3 mmol/L Final

## 2022-10-17 DIAGNOSIS — K21.9 GASTRIC REFLUX: ICD-10-CM

## 2022-10-17 RX ORDER — OMEPRAZOLE 40 MG/1
CAPSULE, DELAYED RELEASE ORAL
Qty: 90 CAPSULE | Refills: 1 | Status: SHIPPED | OUTPATIENT
Start: 2022-10-17

## 2022-11-11 DIAGNOSIS — F10.10 ALCOHOL USE DISORDER, MILD, ABUSE: ICD-10-CM

## 2022-11-11 DIAGNOSIS — F41.9 ANXIETY AND DEPRESSION: ICD-10-CM

## 2022-11-11 DIAGNOSIS — F32.A ANXIETY AND DEPRESSION: ICD-10-CM

## 2022-11-14 DIAGNOSIS — M51.37 DEGENERATIVE DISC DISEASE AT L5-S1 LEVEL: ICD-10-CM

## 2022-11-14 RX ORDER — IBUPROFEN 800 MG/1
800 TABLET ORAL EVERY 6 HOURS PRN
Qty: 90 TABLET | Refills: 1 | Status: SHIPPED | OUTPATIENT
Start: 2022-11-14 | End: 2023-11-14

## 2022-11-16 DIAGNOSIS — D58.2 ELEVATED HEMOGLOBIN (HCC): ICD-10-CM

## 2022-11-16 DIAGNOSIS — F41.9 ANXIETY AND DEPRESSION: ICD-10-CM

## 2022-11-16 DIAGNOSIS — E78.00 PURE HYPERCHOLESTEROLEMIA: ICD-10-CM

## 2022-11-16 DIAGNOSIS — F32.A ANXIETY AND DEPRESSION: ICD-10-CM

## 2022-11-16 DIAGNOSIS — F10.10 ALCOHOL USE DISORDER, MILD, ABUSE: ICD-10-CM

## 2022-11-16 DIAGNOSIS — K21.9 GASTRIC REFLUX: ICD-10-CM

## 2022-11-16 DIAGNOSIS — I10 ESSENTIAL HYPERTENSION: ICD-10-CM

## 2022-11-16 RX ORDER — OMEPRAZOLE 40 MG/1
CAPSULE, DELAYED RELEASE ORAL
Qty: 90 CAPSULE | Refills: 0 | Status: SHIPPED | OUTPATIENT
Start: 2022-11-16

## 2022-11-16 RX ORDER — ATORVASTATIN CALCIUM 10 MG/1
10 TABLET, FILM COATED ORAL NIGHTLY
Qty: 90 TABLET | Refills: 0 | Status: SHIPPED | OUTPATIENT
Start: 2022-11-16

## 2022-11-16 RX ORDER — METOPROLOL TARTRATE 50 MG/1
50 TABLET, FILM COATED ORAL DAILY
Qty: 90 TABLET | Refills: 0 | Status: SHIPPED | OUTPATIENT
Start: 2022-11-16

## 2022-11-16 RX ORDER — ASPIRIN 81 MG/1
81 TABLET ORAL DAILY
Qty: 90 TABLET | Refills: 0 | Status: SHIPPED | OUTPATIENT
Start: 2022-11-16

## 2022-11-16 RX ORDER — HYDROCHLOROTHIAZIDE 25 MG/1
25 TABLET ORAL DAILY
Qty: 90 TABLET | Refills: 0 | Status: SHIPPED | OUTPATIENT
Start: 2022-11-16

## 2022-11-16 RX ORDER — LOSARTAN POTASSIUM 100 MG/1
100 TABLET ORAL DAILY
Qty: 90 TABLET | Refills: 0 | Status: SHIPPED | OUTPATIENT
Start: 2022-11-16

## 2022-12-08 ENCOUNTER — APPOINTMENT (OUTPATIENT)
Dept: GENERAL RADIOLOGY | Age: 57
End: 2022-12-08
Payer: COMMERCIAL

## 2022-12-08 ENCOUNTER — HOSPITAL ENCOUNTER (INPATIENT)
Age: 57
LOS: 3 days | Discharge: HOME | End: 2022-12-11
Attending: INTERNAL MEDICINE | Admitting: INTERNAL MEDICINE
Payer: COMMERCIAL

## 2022-12-08 ENCOUNTER — HOSPITAL ENCOUNTER (EMERGENCY)
Age: 57
Discharge: ANOTHER HOSPITAL | End: 2022-12-08
Attending: SPECIALIST
Payer: COMMERCIAL

## 2022-12-08 VITALS
SYSTOLIC BLOOD PRESSURE: 141 MMHG | WEIGHT: 260 LBS | OXYGEN SATURATION: 93 % | HEIGHT: 71 IN | HEART RATE: 85 BPM | RESPIRATION RATE: 24 BRPM | TEMPERATURE: 97.4 F | DIASTOLIC BLOOD PRESSURE: 93 MMHG | BODY MASS INDEX: 36.4 KG/M2

## 2022-12-08 DIAGNOSIS — I21.3 ST ELEVATION MYOCARDIAL INFARCTION (STEMI), UNSPECIFIED ARTERY (HCC): Primary | ICD-10-CM

## 2022-12-08 DIAGNOSIS — I10 ESSENTIAL HYPERTENSION: ICD-10-CM

## 2022-12-08 DIAGNOSIS — D58.2 ELEVATED HEMOGLOBIN (HCC): ICD-10-CM

## 2022-12-08 LAB
ALBUMIN SERPL-MCNC: 4.4 G/DL (ref 3.5–5.2)
ALBUMIN/GLOBULIN RATIO: 1.3 (ref 1–2.5)
ALP BLD-CCNC: 112 U/L (ref 40–129)
ALT SERPL-CCNC: 48 U/L (ref 5–41)
ANION GAP SERPL CALCULATED.3IONS-SCNC: 18 MMOL/L (ref 9–17)
AST SERPL-CCNC: 37 U/L
BILIRUB SERPL-MCNC: 0.3 MG/DL (ref 0.3–1.2)
BUN BLDV-MCNC: 16 MG/DL (ref 6–20)
CALCIUM SERPL-MCNC: 10.2 MG/DL (ref 8.6–10.4)
CHLORIDE BLD-SCNC: 93 MMOL/L (ref 98–107)
CO2: 24 MMOL/L (ref 20–31)
CREAT SERPL-MCNC: 1.14 MG/DL (ref 0.7–1.2)
D-DIMER QUANTITATIVE: <0.19 MG/L FEU
GFR SERPL CREATININE-BSD FRML MDRD: >60 ML/MIN/1.73M2
GLUCOSE BLD-MCNC: 253 MG/DL (ref 70–99)
HCT VFR BLD CALC: 45.1 % (ref 41–53)
HEMOGLOBIN: 15.5 G/DL (ref 13.5–17.5)
INR BLD: 1
LIPASE: 65 U/L (ref 13–60)
MAGNESIUM: 1.4 MG/DL (ref 1.6–2.6)
MCH RBC QN AUTO: 32.6 PG (ref 26–34)
MCHC RBC AUTO-ENTMCNC: 34.4 G/DL (ref 31–37)
MCV RBC AUTO: 94.7 FL (ref 80–100)
PARTIAL THROMBOPLASTIN TIME: 21.9 SEC (ref 21.3–31.3)
PDW BLD-RTO: 12.7 % (ref 12.5–15.4)
PLATELET # BLD: 250 K/UL (ref 140–450)
PMV BLD AUTO: 9.9 FL (ref 6–12)
POTASSIUM SERPL-SCNC: 3.9 MMOL/L (ref 3.7–5.3)
PROTHROMBIN TIME: 10.4 SEC (ref 9.4–12.6)
RBC # BLD: 4.76 M/UL (ref 4.5–5.9)
SODIUM BLD-SCNC: 135 MMOL/L (ref 135–144)
TOTAL PROTEIN: 7.7 G/DL (ref 6.4–8.3)
TROPONIN, HIGH SENSITIVITY: 11 NG/L (ref 0–22)
TROPONIN, HIGH SENSITIVITY: 46 NG/L (ref 0–22)
WBC # BLD: 14.3 K/UL (ref 3.5–11)

## 2022-12-08 PROCEDURE — 84484 ASSAY OF TROPONIN QUANT: CPT

## 2022-12-08 PROCEDURE — 83690 ASSAY OF LIPASE: CPT

## 2022-12-08 PROCEDURE — 2580000003 HC RX 258: Performed by: SPECIALIST

## 2022-12-08 PROCEDURE — 6360000002 HC RX W HCPCS: Performed by: SPECIALIST

## 2022-12-08 PROCEDURE — 85730 THROMBOPLASTIN TIME PARTIAL: CPT

## 2022-12-08 PROCEDURE — 85379 FIBRIN DEGRADATION QUANT: CPT

## 2022-12-08 PROCEDURE — 2000000000 HC ICU R&B

## 2022-12-08 PROCEDURE — 71045 X-RAY EXAM CHEST 1 VIEW: CPT

## 2022-12-08 PROCEDURE — 93454 CORONARY ARTERY ANGIO S&I: CPT

## 2022-12-08 PROCEDURE — 85027 COMPLETE CBC AUTOMATED: CPT

## 2022-12-08 PROCEDURE — 93005 ELECTROCARDIOGRAM TRACING: CPT | Performed by: SPECIALIST

## 2022-12-08 PROCEDURE — 83735 ASSAY OF MAGNESIUM: CPT

## 2022-12-08 PROCEDURE — 92941 PRQ TRLML REVSC TOT OCCL AMI: CPT

## 2022-12-08 PROCEDURE — 6370000000 HC RX 637 (ALT 250 FOR IP): Performed by: SPECIALIST

## 2022-12-08 PROCEDURE — 6360000004 HC RX CONTRAST MEDICATION

## 2022-12-08 PROCEDURE — 80053 COMPREHEN METABOLIC PANEL: CPT

## 2022-12-08 PROCEDURE — 6360000002 HC RX W HCPCS

## 2022-12-08 PROCEDURE — 2500000003 HC RX 250 WO HCPCS: Performed by: SPECIALIST

## 2022-12-08 PROCEDURE — 85610 PROTHROMBIN TIME: CPT

## 2022-12-08 RX ORDER — HEPARIN SODIUM 10000 [USP'U]/100ML
5-30 INJECTION, SOLUTION INTRAVENOUS CONTINUOUS
Status: DISCONTINUED | OUTPATIENT
Start: 2022-12-08 | End: 2022-12-08 | Stop reason: HOSPADM

## 2022-12-08 RX ORDER — HEPARIN SODIUM 1000 [USP'U]/ML
5000 INJECTION, SOLUTION INTRAVENOUS; SUBCUTANEOUS ONCE
Status: COMPLETED | OUTPATIENT
Start: 2022-12-08 | End: 2022-12-08

## 2022-12-08 RX ORDER — ASPIRIN 81 MG/1
324 TABLET, CHEWABLE ORAL ONCE
Status: COMPLETED | OUTPATIENT
Start: 2022-12-08 | End: 2022-12-08

## 2022-12-08 RX ORDER — METOPROLOL TARTRATE 5 MG/5ML
5 INJECTION INTRAVENOUS ONCE
Status: COMPLETED | OUTPATIENT
Start: 2022-12-08 | End: 2022-12-08

## 2022-12-08 RX ORDER — MAGNESIUM SULFATE IN WATER 40 MG/ML
2000 INJECTION, SOLUTION INTRAVENOUS ONCE
Status: COMPLETED | OUTPATIENT
Start: 2022-12-08 | End: 2022-12-08

## 2022-12-08 RX ORDER — 0.9 % SODIUM CHLORIDE 0.9 %
250 INTRAVENOUS SOLUTION INTRAVENOUS ONCE
Status: COMPLETED | OUTPATIENT
Start: 2022-12-08 | End: 2022-12-08

## 2022-12-08 RX ORDER — NITROGLYCERIN 20 MG/100ML
5-200 INJECTION INTRAVENOUS CONTINUOUS
Status: DISCONTINUED | OUTPATIENT
Start: 2022-12-08 | End: 2022-12-08 | Stop reason: HOSPADM

## 2022-12-08 RX ORDER — MORPHINE SULFATE 4 MG/ML
4 INJECTION, SOLUTION INTRAMUSCULAR; INTRAVENOUS ONCE
Status: COMPLETED | OUTPATIENT
Start: 2022-12-08 | End: 2022-12-08

## 2022-12-08 RX ADMIN — METOPROLOL TARTRATE 5 MG: 5 INJECTION INTRAVENOUS at 23:10

## 2022-12-08 RX ADMIN — HEPARIN SODIUM 5000 UNITS: 1000 INJECTION, SOLUTION INTRAVENOUS; SUBCUTANEOUS at 22:43

## 2022-12-08 RX ADMIN — METOPROLOL TARTRATE 5 MG: 5 INJECTION INTRAVENOUS at 22:51

## 2022-12-08 RX ADMIN — SODIUM CHLORIDE 250 ML: 9 INJECTION, SOLUTION INTRAVENOUS at 20:17

## 2022-12-08 RX ADMIN — SODIUM CHLORIDE 250 ML: 9 INJECTION, SOLUTION INTRAVENOUS at 22:57

## 2022-12-08 RX ADMIN — MAGNESIUM SULFATE HEPTAHYDRATE 2000 MG: 40 INJECTION, SOLUTION INTRAVENOUS at 20:33

## 2022-12-08 RX ADMIN — NITROGLYCERIN 5 MCG/MIN: 20 INJECTION INTRAVENOUS at 22:36

## 2022-12-08 RX ADMIN — HEPARIN SODIUM AND DEXTROSE 8 UNITS/KG/HR: 10000; 5 INJECTION INTRAVENOUS at 22:42

## 2022-12-08 RX ADMIN — ASPIRIN 81 MG 324 MG: 81 TABLET ORAL at 20:17

## 2022-12-08 RX ADMIN — MORPHINE SULFATE 4 MG: 4 INJECTION, SOLUTION INTRAMUSCULAR; INTRAVENOUS at 21:17

## 2022-12-08 ASSESSMENT — ENCOUNTER SYMPTOMS: TACHYPNEA: 1

## 2022-12-08 ASSESSMENT — PAIN - FUNCTIONAL ASSESSMENT: PAIN_FUNCTIONAL_ASSESSMENT: 0-10

## 2022-12-08 ASSESSMENT — PAIN SCALES - GENERAL: PAINLEVEL_OUTOF10: 7

## 2022-12-09 PROBLEM — I21.3 STEMI (ST ELEVATION MYOCARDIAL INFARCTION) (HCC): Status: ACTIVE | Noted: 2022-12-09

## 2022-12-09 LAB
ANION GAP SERPL CALCULATED.3IONS-SCNC: 15 MMOL/L (ref 9–17)
ANION GAP SERPL CALCULATED.3IONS-SCNC: 15 MMOL/L (ref 9–17)
BUN BLDV-MCNC: 12 MG/DL (ref 6–20)
CALCIUM SERPL-MCNC: 8.7 MG/DL (ref 8.6–10.4)
CHLORIDE BLD-SCNC: 96 MMOL/L (ref 98–107)
CHLORIDE BLD-SCNC: 98 MMOL/L (ref 98–107)
CO2: 22 MMOL/L (ref 20–31)
CO2: 22 MMOL/L (ref 20–31)
CREAT SERPL-MCNC: 0.75 MG/DL (ref 0.7–1.2)
EKG ATRIAL RATE: 80 BPM
EKG ATRIAL RATE: 84 BPM
EKG ATRIAL RATE: 84 BPM
EKG P AXIS: 23 DEGREES
EKG P AXIS: 29 DEGREES
EKG P AXIS: 36 DEGREES
EKG P-R INTERVAL: 164 MS
EKG P-R INTERVAL: 170 MS
EKG P-R INTERVAL: 172 MS
EKG Q-T INTERVAL: 366 MS
EKG Q-T INTERVAL: 372 MS
EKG Q-T INTERVAL: 392 MS
EKG QRS DURATION: 74 MS
EKG QRS DURATION: 84 MS
EKG QRS DURATION: 86 MS
EKG QTC CALCULATION (BAZETT): 432 MS
EKG QTC CALCULATION (BAZETT): 439 MS
EKG QTC CALCULATION (BAZETT): 452 MS
EKG R AXIS: -5 DEGREES
EKG R AXIS: 2 DEGREES
EKG R AXIS: 7 DEGREES
EKG T AXIS: 109 DEGREES
EKG T AXIS: 115 DEGREES
EKG T AXIS: 38 DEGREES
EKG VENTRICULAR RATE: 80 BPM
EKG VENTRICULAR RATE: 84 BPM
EKG VENTRICULAR RATE: 84 BPM
GFR SERPL CREATININE-BSD FRML MDRD: >60 ML/MIN/1.73M2
GLUCOSE BLD-MCNC: 207 MG/DL (ref 70–99)
HCT VFR BLD CALC: 40.5 % (ref 40.7–50.3)
HEMOGLOBIN: 13.9 G/DL (ref 13–17)
MAGNESIUM: 2.9 MG/DL (ref 1.6–2.6)
MCH RBC QN AUTO: 32.8 PG (ref 25.2–33.5)
MCHC RBC AUTO-ENTMCNC: 34.3 G/DL (ref 28.4–34.8)
MCV RBC AUTO: 95.5 FL (ref 82.6–102.9)
NRBC AUTOMATED: 0 PER 100 WBC
PDW BLD-RTO: 12 % (ref 11.8–14.4)
PLATELET # BLD: 226 K/UL (ref 138–453)
PMV BLD AUTO: 11.6 FL (ref 8.1–13.5)
POTASSIUM SERPL-SCNC: 3.4 MMOL/L (ref 3.7–5.3)
POTASSIUM SERPL-SCNC: 4.2 MMOL/L (ref 3.7–5.3)
RBC # BLD: 4.24 M/UL (ref 4.21–5.77)
SODIUM BLD-SCNC: 133 MMOL/L (ref 135–144)
SODIUM BLD-SCNC: 135 MMOL/L (ref 135–144)
TROPONIN, HIGH SENSITIVITY: 759 NG/L (ref 0–22)
TROPONIN, HIGH SENSITIVITY: 836 NG/L (ref 0–22)
WBC # BLD: 11.7 K/UL (ref 3.5–11.3)

## 2022-12-09 PROCEDURE — 36415 COLL VENOUS BLD VENIPUNCTURE: CPT

## 2022-12-09 PROCEDURE — 84484 ASSAY OF TROPONIN QUANT: CPT

## 2022-12-09 PROCEDURE — 6370000000 HC RX 637 (ALT 250 FOR IP): Performed by: STUDENT IN AN ORGANIZED HEALTH CARE EDUCATION/TRAINING PROGRAM

## 2022-12-09 PROCEDURE — 2000000000 HC ICU R&B

## 2022-12-09 PROCEDURE — B2111ZZ FLUOROSCOPY OF MULTIPLE CORONARY ARTERIES USING LOW OSMOLAR CONTRAST: ICD-10-PCS | Performed by: INTERNAL MEDICINE

## 2022-12-09 PROCEDURE — C1887 CATHETER, GUIDING: HCPCS

## 2022-12-09 PROCEDURE — C1874 STENT, COATED/COV W/DEL SYS: HCPCS

## 2022-12-09 PROCEDURE — C1769 GUIDE WIRE: HCPCS

## 2022-12-09 PROCEDURE — 94761 N-INVAS EAR/PLS OXIMETRY MLT: CPT

## 2022-12-09 PROCEDURE — 6370000000 HC RX 637 (ALT 250 FOR IP)

## 2022-12-09 PROCEDURE — 83735 ASSAY OF MAGNESIUM: CPT

## 2022-12-09 PROCEDURE — C1894 INTRO/SHEATH, NON-LASER: HCPCS

## 2022-12-09 PROCEDURE — 2580000003 HC RX 258: Performed by: INTERNAL MEDICINE

## 2022-12-09 PROCEDURE — 6360000002 HC RX W HCPCS: Performed by: STUDENT IN AN ORGANIZED HEALTH CARE EDUCATION/TRAINING PROGRAM

## 2022-12-09 PROCEDURE — C1725 CATH, TRANSLUMIN NON-LASER: HCPCS

## 2022-12-09 PROCEDURE — 80051 ELECTROLYTE PANEL: CPT

## 2022-12-09 PROCEDURE — 80048 BASIC METABOLIC PNL TOTAL CA: CPT

## 2022-12-09 PROCEDURE — 6370000000 HC RX 637 (ALT 250 FOR IP): Performed by: INTERNAL MEDICINE

## 2022-12-09 PROCEDURE — 6360000004 HC RX CONTRAST MEDICATION

## 2022-12-09 PROCEDURE — C1892 INTRO/SHEATH,FIXED,PEEL-AWAY: HCPCS

## 2022-12-09 PROCEDURE — 85027 COMPLETE CBC AUTOMATED: CPT

## 2022-12-09 PROCEDURE — 4A023N7 MEASUREMENT OF CARDIAC SAMPLING AND PRESSURE, LEFT HEART, PERCUTANEOUS APPROACH: ICD-10-PCS | Performed by: INTERNAL MEDICINE

## 2022-12-09 PROCEDURE — 027035Z DILATION OF CORONARY ARTERY, ONE ARTERY WITH TWO DRUG-ELUTING INTRALUMINAL DEVICES, PERCUTANEOUS APPROACH: ICD-10-PCS | Performed by: INTERNAL MEDICINE

## 2022-12-09 PROCEDURE — 93005 ELECTROCARDIOGRAM TRACING: CPT | Performed by: INTERNAL MEDICINE

## 2022-12-09 PROCEDURE — 2709999900 HC NON-CHARGEABLE SUPPLY

## 2022-12-09 RX ORDER — SODIUM CHLORIDE 0.9 % (FLUSH) 0.9 %
5-40 SYRINGE (ML) INJECTION EVERY 12 HOURS SCHEDULED
Status: DISCONTINUED | OUTPATIENT
Start: 2022-12-09 | End: 2022-12-11 | Stop reason: HOSPADM

## 2022-12-09 RX ORDER — ACETAMINOPHEN 325 MG/1
650 TABLET ORAL EVERY 4 HOURS PRN
Status: DISCONTINUED | OUTPATIENT
Start: 2022-12-09 | End: 2022-12-11 | Stop reason: HOSPADM

## 2022-12-09 RX ORDER — SODIUM CHLORIDE 9 MG/ML
INJECTION, SOLUTION INTRAVENOUS CONTINUOUS
Status: DISCONTINUED | OUTPATIENT
Start: 2022-12-09 | End: 2022-12-11 | Stop reason: HOSPADM

## 2022-12-09 RX ORDER — POTASSIUM CHLORIDE 20 MEQ/1
40 TABLET, EXTENDED RELEASE ORAL 2 TIMES DAILY WITH MEALS
Status: COMPLETED | OUTPATIENT
Start: 2022-12-09 | End: 2022-12-10

## 2022-12-09 RX ORDER — ONDANSETRON 2 MG/ML
4 INJECTION INTRAMUSCULAR; INTRAVENOUS EVERY 6 HOURS PRN
Status: DISCONTINUED | OUTPATIENT
Start: 2022-12-09 | End: 2022-12-11 | Stop reason: HOSPADM

## 2022-12-09 RX ORDER — ASPIRIN 81 MG/1
81 TABLET ORAL DAILY
Status: DISCONTINUED | OUTPATIENT
Start: 2022-12-10 | End: 2022-12-11 | Stop reason: HOSPADM

## 2022-12-09 RX ORDER — LANOLIN ALCOHOL/MO/W.PET/CERES
3 CREAM (GRAM) TOPICAL NIGHTLY PRN
Status: DISCONTINUED | OUTPATIENT
Start: 2022-12-09 | End: 2022-12-11 | Stop reason: HOSPADM

## 2022-12-09 RX ORDER — ATORVASTATIN CALCIUM 80 MG/1
80 TABLET, FILM COATED ORAL NIGHTLY
Status: DISCONTINUED | OUTPATIENT
Start: 2022-12-09 | End: 2022-12-11 | Stop reason: HOSPADM

## 2022-12-09 RX ORDER — SODIUM CHLORIDE 9 MG/ML
INJECTION, SOLUTION INTRAVENOUS PRN
Status: DISCONTINUED | OUTPATIENT
Start: 2022-12-09 | End: 2022-12-11 | Stop reason: HOSPADM

## 2022-12-09 RX ORDER — MAGNESIUM SULFATE IN WATER 40 MG/ML
2000 INJECTION, SOLUTION INTRAVENOUS
Status: COMPLETED | OUTPATIENT
Start: 2022-12-09 | End: 2022-12-09

## 2022-12-09 RX ORDER — SODIUM CHLORIDE 0.9 % (FLUSH) 0.9 %
5-40 SYRINGE (ML) INJECTION PRN
Status: DISCONTINUED | OUTPATIENT
Start: 2022-12-09 | End: 2022-12-11 | Stop reason: HOSPADM

## 2022-12-09 RX ADMIN — Medication 3 MG: at 22:19

## 2022-12-09 RX ADMIN — SODIUM CHLORIDE, PRESERVATIVE FREE 10 ML: 5 INJECTION INTRAVENOUS at 08:09

## 2022-12-09 RX ADMIN — METOPROLOL TARTRATE 25 MG: 25 TABLET ORAL at 20:48

## 2022-12-09 RX ADMIN — TICAGRELOR 90 MG: 90 TABLET ORAL at 08:08

## 2022-12-09 RX ADMIN — METOPROLOL TARTRATE 25 MG: 25 TABLET ORAL at 08:08

## 2022-12-09 RX ADMIN — TICAGRELOR 90 MG: 90 TABLET ORAL at 20:48

## 2022-12-09 RX ADMIN — MAGNESIUM SULFATE HEPTAHYDRATE 2000 MG: 40 INJECTION, SOLUTION INTRAVENOUS at 15:37

## 2022-12-09 RX ADMIN — SODIUM CHLORIDE: 9 INJECTION, SOLUTION INTRAVENOUS at 03:20

## 2022-12-09 RX ADMIN — MAGNESIUM SULFATE HEPTAHYDRATE 2000 MG: 40 INJECTION, SOLUTION INTRAVENOUS at 13:28

## 2022-12-09 RX ADMIN — ATORVASTATIN CALCIUM 80 MG: 80 TABLET, FILM COATED ORAL at 20:48

## 2022-12-09 RX ADMIN — POTASSIUM CHLORIDE 40 MEQ: 1500 TABLET, EXTENDED RELEASE ORAL at 17:55

## 2022-12-09 ASSESSMENT — ENCOUNTER SYMPTOMS
NAUSEA: 1
SHORTNESS OF BREATH: 1
COUGH: 0
ABDOMINAL PAIN: 0
SORE THROAT: 0
VOMITING: 1

## 2022-12-09 ASSESSMENT — PAIN SCALES - GENERAL
PAINLEVEL_OUTOF10: 0

## 2022-12-09 NOTE — CARE COORDINATION
12/09/22 1100   Service Assessment   Patient Orientation Alert and Oriented   Cognition Alert   History Provided By Patient   Primary Caregiver Self   Accompanied By/Relationship Spouse   Support Systems Spouse/Significant Other   Patient's Healthcare Decision Maker is: Legal Next of Kin   PCP Verified by CM Yes   Last Visit to PCP Within last 3 months   Prior Functional Level Independent in ADLs/IADLs   Current Functional Level Assistance with the following:;Housework; Shopping   Can patient return to prior living arrangement Yes   Ability to make needs known: Good   Family able to assist with home care needs: Yes   Would you like for me to discuss the discharge plan with any other family members/significant others, and if so, who? No   Discharge Planning   Type of Residence House   Living Arrangements Spouse/Significant Other   Current Services Prior To Admission None   Potential Assistance Needed   (Denies needs.)   DME Ordered? No   Potential Assistance Purchasing Medications No   Patient expects to be discharged to: House   One/Two Story Residence Two story   History of falls? 0   Services At/After Discharge   Transition of Care Consult (CM Consult) Discharge Planning   Services At/After Discharge None   Confirm Follow Up Transport Other (see comment)  (Spouse)   Condition of Participation: Discharge Planning   The Plan for Transition of Care is related to the following treatment goals: Pt's goal is to go home. Pt's plan is to home, has transportation, needs denied.

## 2022-12-09 NOTE — PROGRESS NOTES
[facility] [address] [facility]  PROGRESS NOTE    Shift date: 12/09/22    Shift day: Friday   Shift # 2    Room # 6931/4455-76   Name: Alta West                  Referral: Routine Visit    Admit Date & Time: 12/8/2022 11:31 PM    Assessment:  Alta West is a 65 y.o. male in the hospital after experiencing a cardiac event while in the ED, as stated by patient.  responded to patient's room while rounding, was welcomed by patient and his wife Ariadne Grant), and observed those present to be calm and coping well. Intervention:  Writer introduced self and title as  Writer offered space for the patient and his wife  to express feelings, needs, and concerns and provided a ministry presence.  engaged patient and his wife in conversation and heard feelings of gratitude for 'being in the right place at the right time' when patient experienced a heart attack, and feelings of hopefulness for positive lifestyle changes and better health moving forward.  extended hospitality and acknowledged feelings shared. Outcome:  Patient and his wife engaged in conversation and expressed gratitude for the medical care they are receiving and for  visit. Plan:  Chaplains will remain available to offer spiritual and emotional support as needed. 12/09/22 1830   Encounter Summary   Service Provided For: Patient; Family   Referral/Consult From: [facility]   Support System Spouse   Last Encounter  12/09/22   Complexity of Encounter Low   Begin Time 1815   End Time  1830   Total Time Calculated 15 min   Spiritual/Emotional needs   Type Spiritual Support   Assessment/Intervention/Outcome   Assessment Calm;Coping; Hopeful   Intervention Sustaining Presence/Ministry of presence; Active listening   Outcome Expressed Gratitude       Electronically signed by Lois Reynoso Resident, on 12/9/2022 at 6:31 PM.  [address] [place] [phone]

## 2022-12-09 NOTE — ED NOTES
Pt EKG's texted to Dr. Shane Oneill w/Cardiology. Verbal consent received from patient for text message by Dr. Albin Glover.      Kim Bullard RN  12/08/22 5820

## 2022-12-09 NOTE — PROGRESS NOTES
[facility]   Progress Note                   Date:   12/9/2022  Patient name: Raiza George  Date of admission:  12/8/2022 11:31 PM  MRN:   [id number]  YOB: 1965  PCP: ART Santiago CNP    Reason for Admission:      Subjective: There were no acute events overnight, remained hemodynamically stable, denies chest pain, dyspnea, orthopnea or palpitations. Hemodynamically stable. Labs and imaging reviewed. Trop 836  2D ECHO pending. Medications:   Scheduled Meds:   sodium chloride flush  5-40 mL IntraVENous 2 times per day    [START ON 12/10/2022] aspirin  81 mg Oral Daily    metoprolol tartrate  25 mg Oral BID    ticagrelor  90 mg Oral BID    atorvastatin  80 mg Oral Nightly       Continuous Infusions:   sodium chloride 75 mL/hr at 12/09/22 0320    sodium chloride         CBC:   Recent Labs     12/08/22 1931 12/09/22  0525   WBC 14.3* 11.7*   HGB 15.5 13.9    226     BMP:    Recent Labs     12/08/22 1931 12/09/22  0525    133*   K 3.9 3.4*   CL 93* 96*   CO2 24 22   BUN 16 12   CREATININE 1.14 0.75   GLUCOSE 253* 207*     Hepatic:   Recent Labs     12/08/22 1931   AST 37   ALT 48*   BILITOT 0.3   ALKPHOS 112     Troponin: No results for input(s): TROPONINI in the last 72 hours. BNP: No results for input(s): BNP in the last 72 hours. Lipids: No results for input(s): CHOL, HDL in the last 72 hours.     Invalid input(s): LDLCALCU  INR:   Recent Labs     12/08/22 1931   INR 1.0       Objective:   Vitals: /82   Pulse 72   Temp 98.4 °F (36.9 °C) (Oral)   SpO2 92%     General appearance: awake, alert, in no apparent respiratory distress   HEENT: Head: Normocephalic, no lesions, without obvious abnormality  Neck: no JVD  Lungs: clear to auscultation bilaterally, no basilar rales, no wheezing   Heart: regular rate and rhythm, S1, S2 normal, no murmur, click, rub or gallop  Abdomen: soft, non-tender; bowel sounds normal  Extremities: No LE edema  Neurologic: Mental status: Alert, oriented. Motor and sensory not done. EKG: Inferior STEMI    Echocardiogram: Pending     Stress test : 7/22/2021  Normal study. Risk stratification: Low         Coronary Angiography: 12/08/2022  Conclusions      Procedure Summary      LAD 80% proximal stenosis. 70% mid stenosis at bifurcation of large   diagonal branch. The diagonal has 80% ostial stenosis. RCA 99% mid stenosis reduced to 0% using 2.5x38 mm Resolute Alivia. PD using   3 mm NC balloon. 80% distal stenosis reduced to 0% using 2.5x15 mm   Resolute ALIVIA. Recommendations      DAPT and risk factor modification. Post cath orders. Echo. Staged PCI of LAd/Diagonal in 4-6 wks. Assessment / Acute Cardiac Problems:   Inferior STEMI s/p PCI to RCA 12/8/2022  Residual disease of LAD/ Diagonal   2D ECHO pending   HTN  HLD  Type II DM  Obesity  Former Smoker    Patient Active Problem List:     Essential hypertension     Class 2 severe obesity due to excess calories with serious comorbidity and body mass index (BMI) of 38.0 to 38.9 in adult ([facility] [address]     Anxiety and depression     Positive colorectal cancer screening using Cologuard test     Elevated ALT measurement     Pure hypercholesterolemia     Umbilical hernia     Ventral hernia     Right inguinal hernia     Trigger finger, right middle finger     Degenerative joint disease of right acromioclavicular joint     Infraspinatus tendinitis, right     Abnormal prostate exam- calcifications shown on CT ABD/pelvis 7-10-19     Alcohol use disorder, mild, abuse     Former smoker     Gastroesophageal reflux disease without esophagitis     Type 2 diabetes mellitus without complication, without long-term current use of insulin (HCC)     STEMI (ST elevation myocardial infarction) ([facility] [address]      Plan of Treatment:   Continue ASA 81 mg daily, birlinta 90 mg BID. Continue lipitor 80 mg nightly. Continue lopressor 25 mg BID. Obtain 2D ECHO to eval EF.    Continue PT/OT, cardiac rehab. Trend troponin q12. Replace electrolytes to keep K>4 and Mg>2. We will continue to follow. Discussed with patient and nursing. Diego Krishnan MD MD  Fellow, [facility]   Pager - [phone]    Attending Cardiologist Addendum: I have reviewed and performed the history, physical, subjective, objective, assessment, and plan with the student/resident/fellow/APN and agree with the note. I performed the history and physical personally. I have made changes to the note above as needed. Patient seen and examined. Denies any chest pains. No site injection hematoma. Plan for staged PCI to the LAD/diagonal in 4 weeks. 2D echocardiogram still pending    Discussed with patient in detail. All questions answered. Agrees with plan as outlined above. Thank you for allowing me to participate in the care of this patient, please do not hesitate to call if you have any questions. Zeb Alexandra DO, [facility], [address], [address], [address] [facility]  [facility]  [phone]

## 2022-12-09 NOTE — PROCEDURES
Conclusions      Procedure Summary      LAD 80% proximal stenosis. 70% mid stenosis at bifurcation of large   diagonal branch. The diagonal has 80% ostial stenosis. RCA 99% mid stenosis reduced to 0% using 2.5x38 mm Resolute Alivia. PD using   3 mm NC balloon. 80% distal stenosis reduced to 0% using 2.5x15 mm   Resolute ALIVIA. Recommendations      DAPT and risk factor modification. Post cath orders. Echo. Staged PCI of LAd/Diagonal in 4-6 wks. Blood loss 30 ml.        Signature      ----------------------------------------------------------------   Electronically signed by Misael Pettit(Performing Physician)   on 12/09/2022 01:12   ----------------------------------------------------------------

## 2022-12-09 NOTE — H&P
[facility]   Admission Note                 Date:   12/8/2022  Patient name: Mehrdad Black  Date of admission:  12/8/2022 11:31 PM  MRN:   [id number]  YOB: 1965    Reason for Admission:  Chest pain    CHIEF COMPLAINT:  Chest pain. History Obtained From:  patient    HISTORY OF PRESENT ILLNESS:      The patient is a 65 y.o.  male who is admitted to the hospital for Chest pain  The patient presented to [facility] ER with chest pain, intermittent. Had similar symptoms yesterday with exertion and resolved, had again two episodes today. Presented to [facility]. Had serial ECG done. Was called after 3rd ECG showing ST elevation in inferior leads. After reviewing all ECG, patient ongoing symptoms of chest pain, cath lab activated and patient taken for emergent coronary angiography. Past Medical History:   has a past medical history of Arthritis, Diabetes mellitus ([address] GERD (gastroesophageal reflux disease), Hyperlipidemia, Hypertension, Kidney stone, Obesity, and Positive colorectal cancer screening using Cologuard test.    Past Surgical History:   has a past surgical history that includes Hand surgery; [place] tooth extraction; Colonoscopy (N/A, 7/30/2019); Shoulder arthroscopy (Right, 02/06/2020); Shoulder arthroscopy (Right, 2/6/2020); Biceps tendon repair (Right, 2/6/2020); Cystoscopy (Right, 8/18/2020); Lithotripsy; hernia repair (11/06/2020); and hernia repair (N/A, 11/6/2020). Home Medications:    Prior to Admission medications    Medication Sig Start Date End Date Taking?  Authorizing Provider   aspirin (EQ ASPIRIN ADULT LOW DOSE) 81 MG EC tablet Take 1 tablet by mouth daily 11/16/22   ART Plunkett CNP   atorvastatin (LIPITOR) 10 MG tablet Take 1 tablet by mouth nightly 11/16/22   ART Plunkett CNP   hydroCHLOROthiazide (HYDRODIURIL) 25 MG tablet Take 1 tablet by mouth daily 11/16/22   ART Plunkett - CNP   losartan (COZAAR) 100 MG tablet Take 1 tablet by mouth daily 11/16/22   Anibal Screen, APRN - CNP   metFORMIN (GLUCOPHAGE) 1000 MG tablet TAKE 1 TABLET BY MOUTH TWICE DAILY WITH MEALS 11/16/22   Anibal Screen, APRN - CNP   metoprolol tartrate (LOPRESSOR) 50 MG tablet Take 1 tablet by mouth daily 11/16/22   Anibal Screen, APRN - CNP   omeprazole (PRILOSEC) 40 MG delayed release capsule Take 1 capsule by mouth once daily 11/16/22   Anibal Screen, APRN - CNP   sertraline (ZOLOFT) 50 MG tablet Take 1.5 tablets by mouth daily 11/16/22   Anibal Screen, APRN - CNP   ibuprofen (ADVIL;MOTRIN) 800 MG tablet Take 1 tablet by mouth every 6 hours as needed for Pain 11/14/22 11/14/23  ART Aguila NP       Allergies:  Patient has no known allergies. Social History:   reports that he quit smoking about 9 years ago. His smoking use included cigarettes. He has a 70.00 pack-year smoking history. He has never used smokeless tobacco. He reports current alcohol use of about 21.0 standard drinks per week. He reports that he does not use drugs. Family History: family history includes Diabetes in his father and mother. No for premature CAD. No for h/o sudden cardiac death    REVIEW OF SYSTEMS:    Constitutional: there has been no unanticipated weight loss. There's been No change in energy level, No change in activity level. Eyes: No visual changes or diplopia. No scleral icterus. ENT: No Headaches, hearing loss or vertigo. No mouth sores or sore throat. Cardiovascular: Yes chest pain, No dyspnea on exertion, No palpitations or No loss of consciousness. No cough, hemoptysis, No pleuritic pain, or phlebitis. Respiratory: No cough or wheezing, no sputum production. No hematemesis. Gastrointestinal: No abdominal pain, appetite loss, blood in stools. No change in bowel or bladder habits. Genitourinary: No dysuria, trouble voiding, or hematuria.   Musculoskeletal:  No gait disturbance, No weakness or joint complaints. Integumentary: No rash or pruritis. Neurological: No headache, diplopia, change in muscle strength, numbness or tingling. No change in gait, balance, coordination, mood, affect, memory, mentation, behavior. Psychiatric: No anxiety, or depression. Endocrine: No temperature intolerance. No excessive thirst, fluid intake, or urination. No tremor. Hematologic/Lymphatic: No abnormal bruising or bleeding, blood clots or swollen lymph nodes. Allergic/Immunologic: No nasal congestion or hives. PHYSICAL EXAM:    Physical Examination:    There were no vitals taken for this visit. Constitutional and General Appearance: alert, cooperative, no distress, and appears stated age  [de-identified]: PERRL, no cervical lymphadenopathy. No masses palpable. Normal oral mucosa  Respiratory:  Normal excursion and expansion without use of accessory muscles  Resp Auscultation: Good respiratory effort. No for increased work of breathing. On auscultation: clear to auscultation bilaterally  Cardiovascular: The apical impulse is not displaced  Heart tones are crisp and normal. regular S1 and S2.  Jugular venous pulsation Normal  The carotid upstroke is normal in amplitude and contour without delay or bruit  Peripheral pulses are symmetrical and full   Abdomen:  No masses or tenderness  Bowel sounds present  Extremities:   No Cyanosis or Clubbing   Lower extremity edema: No   Skin: Warm and dry  Neurological:  Alert and oriented. Moves all extremities well  No abnormalities of mood, affect, memory, mentation, or behavior are noted    DATA:    Diagnostics:      EKG: NSR, inferior STEMI. Labs:     CBC:   Recent Labs     12/08/22 1931   WBC 14.3*   HGB 15.5   HCT 45.1        BMP:   Recent Labs     12/08/22 1931      K 3.9   CO2 24   BUN 16   CREATININE 1.14   LABGLOM >60   GLUCOSE 253*     BNP: No results for input(s): BNP in the last 72 hours.   PT/INR:   Recent Labs     12/08/22 1931   PROTIME 10.4   INR 1.0 APTT:  Recent Labs     12/08/22 1931   APTT 21.9     CARDIAC ENZYMES:No results for input(s): CKTOTAL, CKMB, CKMBINDEX, TROPONINI in the last 72 hours. FASTING LIPID PANEL:  Lab Results   Component Value Date/Time    HDL 43 04/13/2022 10:34 AM    LDLCALC 112 06/13/2019 12:00 AM    TRIG 124 04/13/2022 10:34 AM     LIVER PROFILE:  Recent Labs     12/08/22 1931   AST 37   ALT 48*   LABALBU 4.4         IMPRESSION/RECOMMENDATIONS:  Inferior STEMI. Will proceed with emergent coronary angiography and possible PCI. Benefits, risks and alternatives explained and verbal consent obtained. Discussed with patient and nursing.     Ye Barbour MD, MD  [facility]        [phone]

## 2022-12-10 LAB
ANION GAP SERPL CALCULATED.3IONS-SCNC: 8 MMOL/L (ref 9–17)
BUN BLDV-MCNC: 9 MG/DL (ref 6–20)
CALCIUM SERPL-MCNC: 8.7 MG/DL (ref 8.6–10.4)
CHLORIDE BLD-SCNC: 104 MMOL/L (ref 98–107)
CO2: 23 MMOL/L (ref 20–31)
CREAT SERPL-MCNC: 0.66 MG/DL (ref 0.7–1.2)
EKG ATRIAL RATE: 90 BPM
EKG P AXIS: 46 DEGREES
EKG P-R INTERVAL: 178 MS
EKG Q-T INTERVAL: 374 MS
EKG QRS DURATION: 88 MS
EKG QTC CALCULATION (BAZETT): 457 MS
EKG R AXIS: 30 DEGREES
EKG T AXIS: 100 DEGREES
EKG VENTRICULAR RATE: 90 BPM
GFR SERPL CREATININE-BSD FRML MDRD: >60 ML/MIN/1.73M2
GLUCOSE BLD-MCNC: 189 MG/DL (ref 70–99)
HCT VFR BLD CALC: 43.3 % (ref 40.7–50.3)
HEMOGLOBIN: 14.4 G/DL (ref 13–17)
MAGNESIUM: 2.1 MG/DL (ref 1.6–2.6)
MCH RBC QN AUTO: 32.8 PG (ref 25.2–33.5)
MCHC RBC AUTO-ENTMCNC: 33.3 G/DL (ref 28.4–34.8)
MCV RBC AUTO: 98.6 FL (ref 82.6–102.9)
NRBC AUTOMATED: 0 PER 100 WBC
PDW BLD-RTO: 11.9 % (ref 11.8–14.4)
PLATELET # BLD: 217 K/UL (ref 138–453)
PMV BLD AUTO: 11.4 FL (ref 8.1–13.5)
POTASSIUM SERPL-SCNC: 4.4 MMOL/L (ref 3.7–5.3)
RBC # BLD: 4.39 M/UL (ref 4.21–5.77)
SODIUM BLD-SCNC: 135 MMOL/L (ref 135–144)
WBC # BLD: 10.6 K/UL (ref 3.5–11.3)

## 2022-12-10 PROCEDURE — 6370000000 HC RX 637 (ALT 250 FOR IP): Performed by: STUDENT IN AN ORGANIZED HEALTH CARE EDUCATION/TRAINING PROGRAM

## 2022-12-10 PROCEDURE — 2580000003 HC RX 258: Performed by: INTERNAL MEDICINE

## 2022-12-10 PROCEDURE — 93306 TTE W/DOPPLER COMPLETE: CPT

## 2022-12-10 PROCEDURE — 36415 COLL VENOUS BLD VENIPUNCTURE: CPT

## 2022-12-10 PROCEDURE — 6370000000 HC RX 637 (ALT 250 FOR IP): Performed by: INTERNAL MEDICINE

## 2022-12-10 PROCEDURE — 2060000000 HC ICU INTERMEDIATE R&B

## 2022-12-10 PROCEDURE — 80048 BASIC METABOLIC PNL TOTAL CA: CPT

## 2022-12-10 PROCEDURE — 94761 N-INVAS EAR/PLS OXIMETRY MLT: CPT

## 2022-12-10 PROCEDURE — 85027 COMPLETE CBC AUTOMATED: CPT

## 2022-12-10 PROCEDURE — 83735 ASSAY OF MAGNESIUM: CPT

## 2022-12-10 RX ADMIN — Medication 81 MG: at 08:09

## 2022-12-10 RX ADMIN — POTASSIUM CHLORIDE 40 MEQ: 1500 TABLET, EXTENDED RELEASE ORAL at 08:09

## 2022-12-10 RX ADMIN — SODIUM CHLORIDE, PRESERVATIVE FREE 10 ML: 5 INJECTION INTRAVENOUS at 08:09

## 2022-12-10 RX ADMIN — TICAGRELOR 90 MG: 90 TABLET ORAL at 21:13

## 2022-12-10 RX ADMIN — TICAGRELOR 90 MG: 90 TABLET ORAL at 08:09

## 2022-12-10 RX ADMIN — ATORVASTATIN CALCIUM 80 MG: 80 TABLET, FILM COATED ORAL at 21:13

## 2022-12-10 RX ADMIN — METOPROLOL TARTRATE 25 MG: 25 TABLET ORAL at 08:09

## 2022-12-10 RX ADMIN — METOPROLOL TARTRATE 25 MG: 25 TABLET ORAL at 21:13

## 2022-12-10 RX ADMIN — SODIUM CHLORIDE, PRESERVATIVE FREE 10 ML: 5 INJECTION INTRAVENOUS at 21:14

## 2022-12-10 NOTE — PLAN OF CARE
Problem: Chronic Conditions and Co-morbidities  Goal: Patient's chronic conditions and co-morbidity symptoms are monitored and maintained or improved  12/10/2022 1709 by Kirit Resendez RN  Outcome: Progressing  12/10/2022 0422 by Kae Montero RN  Outcome: Progressing     Problem: Discharge Planning  Goal: Discharge to home or other facility with appropriate resources  12/10/2022 1709 by Kirit Resendez RN  Outcome: Progressing  12/10/2022 0422 by Kae Montero RN  Outcome: Progressing     Problem: Neurosensory - Adult  Goal: Achieves stable or improved neurological status  Outcome: Progressing  Goal: Achieves maximal functionality and self care  Outcome: Progressing     Problem: Respiratory - Adult  Goal: Achieves optimal ventilation and oxygenation  Outcome: Progressing     Problem: Cardiovascular - Adult  Goal: Maintains optimal cardiac output and hemodynamic stability  Outcome: Progressing  Goal: Absence of cardiac dysrhythmias or at baseline  Outcome: Progressing     Problem: Skin/Tissue Integrity - Adult  Goal: Incisions, wounds, or drain sites healing without S/S of infection  Outcome: Progressing  Flowsheets (Taken 12/10/2022 0900)  Incisions, Wounds, or Drain Sites Healing Without Sign and Symptoms of Infection:   TWICE DAILY: Assess and document skin integrity   TWICE DAILY: Assess and document dressing/incision, wound bed, drain sites and surrounding tissue     Problem: Musculoskeletal - Adult  Goal: Return mobility to safest level of function  Outcome: Progressing  Goal: Return ADL status to a safe level of function  Outcome: Progressing     Problem: Gastrointestinal - Adult  Goal: Minimal or absence of nausea and vomiting  Outcome: Progressing  Goal: Maintains or returns to baseline bowel function  Outcome: Progressing  Goal: Maintains adequate nutritional intake  Outcome: Progressing     Problem: Genitourinary - Adult  Goal: Absence of urinary retention  Outcome: Progressing Problem: Infection - Adult  Goal: Absence of infection at discharge  Outcome: Progressing  Goal: Absence of infection during hospitalization  Outcome: Progressing  Goal: Absence of fever/infection during anticipated neutropenic period  Outcome: Progressing     Problem: Metabolic/Fluid and Electrolytes - Adult  Goal: Electrolytes maintained within normal limits  Outcome: Progressing  Goal: Hemodynamic stability and optimal renal function maintained  Outcome: Progressing  Goal: Glucose maintained within prescribed range  Outcome: Progressing     Problem: Hematologic - Adult  Goal: Maintains hematologic stability  Outcome: Progressing

## 2022-12-10 NOTE — PROGRESS NOTES
[facility]   Progress Note                   Date:   12/10/2022  Patient name: Soledad Martinez  Date of admission:  12/8/2022 11:31 PM  MRN:   [id number]  YOB: 1965  PCP: ART Aguilar CNP    Reason for Admission:      Subjective:      Patient was seen and evaluated at bedside, remains hemodynamically stable, denies any chest pain or dyspnea. Troponin down trended. Cr 0.66. Hb stable. 2D ECHO pending. Medications:   Scheduled Meds:   sodium chloride flush  5-40 mL IntraVENous 2 times per day    aspirin  81 mg Oral Daily    metoprolol tartrate  25 mg Oral BID    ticagrelor  90 mg Oral BID    atorvastatin  80 mg Oral Nightly       Continuous Infusions:   sodium chloride 75 mL/hr at 12/09/22 1739    sodium chloride         CBC:   Recent Labs     12/08/22 1931 12/09/22  0525 12/10/22  0401   WBC 14.3* 11.7* 10.6   HGB 15.5 13.9 14.4    226 217       BMP:    Recent Labs     12/08/22 1931 12/09/22  0525 12/09/22  1759 12/10/22  0401    133* 135 135   K 3.9 3.4* 4.2 4.4   CL 93* 96* 98 104   CO2 24 22 22 23   BUN 16 12  --  9   CREATININE 1.14 0.75  --  0.66*   GLUCOSE 253* 207*  --  189*       Hepatic:   Recent Labs     12/08/22 1931   AST 37   ALT 48*   BILITOT 0.3   ALKPHOS 112       Troponin: No results for input(s): TROPONINI in the last 72 hours. BNP: No results for input(s): BNP in the last 72 hours. Lipids: No results for input(s): CHOL, HDL in the last 72 hours.     Invalid input(s): LDLCALCU  INR:   Recent Labs     12/08/22 1931   INR 1.0         Objective:   Vitals: /85   Pulse 79   Temp 98.4 °F (36.9 °C) (Oral)   SpO2 93%     General appearance: awake, alert, in no apparent respiratory distress   HEENT: Head: Normocephalic, no lesions, without obvious abnormality  Neck: no JVD  Lungs: clear to auscultation bilaterally, no basilar rales, no wheezing   Heart: regular rate and rhythm, S1, S2 normal, no murmur, click, rub or gallop  Abdomen: soft, non-tender; bowel sounds normal  Extremities: No LE edema  Neurologic: Mental status: Alert, oriented. Motor and sensory not done. EKG: Inferior STEMI    Echocardiogram: Pending     Stress test : 7/22/2021  Normal study. Risk stratification: Low         Coronary Angiography: 12/08/2022  Conclusions      Procedure Summary      LAD 80% proximal stenosis. 70% mid stenosis at bifurcation of large   diagonal branch. The diagonal has 80% ostial stenosis. RCA 99% mid stenosis reduced to 0% using 2.5x38 mm Resolute Alivia. PD using   3 mm NC balloon. 80% distal stenosis reduced to 0% using 2.5x15 mm   Resolute ALIVIA. Recommendations      DAPT and risk factor modification. Post cath orders. Echo. Staged PCI of LAd/Diagonal in 4-6 wks. Assessment / Acute Cardiac Problems:   Inferior STEMI s/p PCI to RCA 12/8/2022  Residual disease of LAD/ Diagonal   2D ECHO pending   HTN  HLD  Type II DM  Obesity  Former Smoker    Patient Active Problem List:     Essential hypertension     Class 2 severe obesity due to excess calories with serious comorbidity and body mass index (BMI) of 38.0 to 38.9 in adult ([facility]     Anxiety and depression     Positive colorectal cancer screening using Cologuard test     Elevated ALT measurement     Pure hypercholesterolemia     Umbilical hernia     Ventral hernia     Right inguinal hernia     Trigger finger, right middle finger     Degenerative joint disease of right acromioclavicular joint     Infraspinatus tendinitis, right     Abnormal prostate exam- calcifications shown on CT ABD/pelvis 7-10-19     Alcohol use disorder, mild, abuse     Former smoker     Gastroesophageal reflux disease without esophagitis     Type 2 diabetes mellitus without complication, without long-term current use of insulin (HCC)     STEMI (ST elevation myocardial infarction) ([facility]      Plan of Treatment:   Continue ASA 81 mg daily, birlinta 90 mg BID. Continue lipitor 80 mg nightly. Continue lopressor 25 mg BID. Follow up 2D ECHO to eval EF. Continue PT/OT, cardiac rehab. Troponin down trended, stop trending. Replace electrolytes to keep K>4 and Mg>2. We will continue to follow. Discussed with patient and nursing.      Lisa Cesar MD MD  Fellow, [facility]   Pager - [phone]

## 2022-12-11 VITALS
SYSTOLIC BLOOD PRESSURE: 135 MMHG | HEART RATE: 71 BPM | DIASTOLIC BLOOD PRESSURE: 93 MMHG | TEMPERATURE: 97.7 F | OXYGEN SATURATION: 96 % | RESPIRATION RATE: 18 BRPM

## 2022-12-11 LAB
ABSOLUTE EOS #: 0.41 K/UL (ref 0–0.44)
ABSOLUTE IMMATURE GRANULOCYTE: 0.05 K/UL (ref 0–0.3)
ABSOLUTE LYMPH #: 3.58 K/UL (ref 1.1–3.7)
ABSOLUTE MONO #: 1.11 K/UL (ref 0.1–1.2)
ANION GAP SERPL CALCULATED.3IONS-SCNC: 13 MMOL/L (ref 9–17)
BASOPHILS # BLD: 1 % (ref 0–2)
BASOPHILS ABSOLUTE: 0.11 K/UL (ref 0–0.2)
BUN BLDV-MCNC: 12 MG/DL (ref 6–20)
CALCIUM SERPL-MCNC: 9.1 MG/DL (ref 8.6–10.4)
CHLORIDE BLD-SCNC: 103 MMOL/L (ref 98–107)
CO2: 20 MMOL/L (ref 20–31)
CREAT SERPL-MCNC: 0.66 MG/DL (ref 0.7–1.2)
EOSINOPHILS RELATIVE PERCENT: 4 % (ref 1–4)
GFR SERPL CREATININE-BSD FRML MDRD: >60 ML/MIN/1.73M2
GLUCOSE BLD-MCNC: 299 MG/DL (ref 70–99)
HCT VFR BLD CALC: 44.4 % (ref 40.7–50.3)
HEMOGLOBIN: 14.5 G/DL (ref 13–17)
IMMATURE GRANULOCYTES: 1 %
LYMPHOCYTES # BLD: 36 % (ref 24–43)
MAGNESIUM: 1.7 MG/DL (ref 1.6–2.6)
MCH RBC QN AUTO: 33 PG (ref 25.2–33.5)
MCHC RBC AUTO-ENTMCNC: 32.7 G/DL (ref 28.4–34.8)
MCV RBC AUTO: 101.1 FL (ref 82.6–102.9)
MONOCYTES # BLD: 11 % (ref 3–12)
NRBC AUTOMATED: 0 PER 100 WBC
PDW BLD-RTO: 12 % (ref 11.8–14.4)
PLATELET # BLD: 216 K/UL (ref 138–453)
PMV BLD AUTO: 11.8 FL (ref 8.1–13.5)
POTASSIUM SERPL-SCNC: 4.3 MMOL/L (ref 3.7–5.3)
RBC # BLD: 4.39 M/UL (ref 4.21–5.77)
SEG NEUTROPHILS: 47 % (ref 36–65)
SEGMENTED NEUTROPHILS ABSOLUTE COUNT: 4.57 K/UL (ref 1.5–8.1)
SODIUM BLD-SCNC: 136 MMOL/L (ref 135–144)
WBC # BLD: 9.8 K/UL (ref 3.5–11.3)

## 2022-12-11 PROCEDURE — 2580000003 HC RX 258: Performed by: INTERNAL MEDICINE

## 2022-12-11 PROCEDURE — 83735 ASSAY OF MAGNESIUM: CPT

## 2022-12-11 PROCEDURE — 80048 BASIC METABOLIC PNL TOTAL CA: CPT

## 2022-12-11 PROCEDURE — 36415 COLL VENOUS BLD VENIPUNCTURE: CPT

## 2022-12-11 PROCEDURE — 6370000000 HC RX 637 (ALT 250 FOR IP): Performed by: INTERNAL MEDICINE

## 2022-12-11 PROCEDURE — 85025 COMPLETE CBC W/AUTO DIFF WBC: CPT

## 2022-12-11 RX ORDER — ATORVASTATIN CALCIUM 80 MG/1
80 TABLET, FILM COATED ORAL NIGHTLY
Qty: 30 TABLET | Refills: 3 | Status: SHIPPED | OUTPATIENT
Start: 2022-12-11

## 2022-12-11 RX ORDER — ATORVASTATIN CALCIUM 80 MG/1
80 TABLET, FILM COATED ORAL NIGHTLY
Qty: 30 TABLET | Refills: 3 | Status: SHIPPED | OUTPATIENT
Start: 2022-12-11 | End: 2022-12-11 | Stop reason: SDUPTHER

## 2022-12-11 RX ADMIN — SODIUM CHLORIDE, PRESERVATIVE FREE 10 ML: 5 INJECTION INTRAVENOUS at 08:25

## 2022-12-11 RX ADMIN — METOPROLOL TARTRATE 25 MG: 25 TABLET ORAL at 08:20

## 2022-12-11 RX ADMIN — TICAGRELOR 90 MG: 90 TABLET ORAL at 08:20

## 2022-12-11 RX ADMIN — Medication 81 MG: at 08:20

## 2022-12-11 NOTE — DISCHARGE SUMMARY
[facility]  Discharge Note                 Name:  Jen Duffy  YOB: 1965  Social Security Number:  [id number]  Medical Record Number:  [id number]    Date of Admission:  12/8/2022  Date of Discharge:  12/10/2022    Admitting physician: Clayton Pope MD    Discharge Attending: Dr. Alejandro Dyson Physician: Suma Ernandez, APRN - CNP  Consultants: None  Discharge to [address] [place] [address] LIST:  Patient Active Problem List   Diagnosis    Essential hypertension    Class 2 severe obesity due to excess calories with serious comorbidity and body mass index (BMI) of 38.0 to 38.9 in adult [facility])    Anxiety and depression    Positive colorectal cancer screening using Cologuard test    Elevated ALT measurement    Pure hypercholesterolemia    Umbilical hernia    Ventral hernia    Right inguinal hernia    Trigger finger, right middle finger    Degenerative joint disease of right acromioclavicular joint    Infraspinatus tendinitis, right    Abnormal prostate exam- calcifications shown on CT ABD/pelvis 7-10-19    Alcohol use disorder, mild, abuse    Former smoker    Gastroesophageal reflux disease without esophagitis    Type 2 diabetes mellitus without complication, without long-term current use of insulin ([facility] [address]    STEMI (ST elevation myocardial infarction) ([facility]         Procedures:  cardiac catheterization    HOSPITAL COURSE :   The patient was admitted for: Inferior STEMI s/p PCI to RCA, has residual disease of the LAD/Diag, plan for staged PCI in 4-6 weeks. The patient was started on DAPT- ASA and brilinta. 2D ECHO was obtained which revealed EF 45-50%, was continued on lopressor 50 mg BID with the plan to add his home medication losartan on follow up if BP is able to tolerate.      Hospital Procedures if any: Coronary angiogram s/p PTCA/BRIAN to RCA   Medications changes recommendation: As per discharge list   Follow Up Plan: 4 weeks as outpatient     Discharge exam:   Vitals: 12/10/22 1635   BP: 126/83   Pulse: 87   Resp: 18   Temp: 98.1 °F (36.7 °C)   SpO2: 94%       Patient is feeling much better, denies any chest pain,k dyspnea, orthopnea or palpitations. Neuro: normal  Chest: Clear to ausculation. No wheezing. Cardiac: Cor RRR  Abdomen/groin: soft, non-tender, without masses or organomegaly  Lower extremity edema: none     Follow up with primary care provider 1 week  Follow up with cardiology 4 weeks  Follow up with other consultant physicians at their directions. Discharge Medications:     Medication List        ASK your doctor about these medications      aspirin 81 MG EC tablet  Commonly known as: EQ Aspirin Adult Low Dose  Take 1 tablet by mouth daily     atorvastatin 10 MG tablet  Commonly known as: LIPITOR  Take 1 tablet by mouth nightly     hydroCHLOROthiazide 25 MG tablet  Commonly known as: HYDRODIURIL  Take 1 tablet by mouth daily     ibuprofen 800 MG tablet  Commonly known as: ADVIL;MOTRIN  Take 1 tablet by mouth every 6 hours as needed for Pain     losartan 100 MG tablet  Commonly known as: COZAAR  Take 1 tablet by mouth daily     metFORMIN 1000 MG tablet  Commonly known as: GLUCOPHAGE  TAKE 1 TABLET BY MOUTH TWICE DAILY WITH MEALS     metoprolol tartrate 50 MG tablet  Commonly known as: LOPRESSOR  Take 1 tablet by mouth daily     omeprazole 40 MG delayed release capsule  Commonly known as: PRILOSEC  Take 1 capsule by mouth once daily     sertraline 50 MG tablet  Commonly known as: ZOLOFT  Take 1.5 tablets by mouth daily                 Discussed with patient and nursing. Medications and discharge instructions reviewed with patient and nursing. Patient counseled in detail regarding medication compliance and risk factor modification.        Electronically signed by Fabrizio Roberts MD on 12/10/2022 at 7:23 PM  Fellow, [address] [place] [address]

## 2022-12-11 NOTE — DISCHARGE INSTR - DIET
Good nutrition is important when healing from an illness, injury, or surgery. Follow any nutrition recommendations given to you during your hospital stay. If you were given an oral nutrition supplement while in the hospital, continue to take this supplement at home. You can take it with meals, in-between meals, and/or before bedtime. These supplements can be purchased at most local grocery stores, pharmacies, and chain [employer]-stores. If you have any questions about your diet or nutrition, call the hospital and ask for the dietitian. Diabetic diet as tolerated.

## 2022-12-11 NOTE — CARE COORDINATION
Met with patient to discuss transitional planning. He is returning home independently.  He denies needs and has transportation    Discharge [facility] Case Management Department  Written by: Juanita Nicholson RN    Patient Name: Abdifatah Gross  Attending Provider: Júnior Delcid MD  Admit Date: 2022 11:31 PM  MRN: [id number]  Account: [de-identified]                     : 1965  Discharge Date: 2022      Disposition: home    Juanita Nicholson RN

## 2022-12-11 NOTE — PROGRESS NOTES
Writer reviewed discharge instructions with patient and states understanding. IVs removed. Patient collected own belongings. Discharged to home, patient ambulatory to exit with wife.

## 2022-12-11 NOTE — DISCHARGE INSTRUCTIONS
Continue ASA and brilinta. Continue lopressor 25 mg BID, check your BP at home and maintain a BP log, follow up with [facility] office in 1-2 weeks to evaluate for starting losartan back. You do need staged procedure in 4-6 weeks, to be planned on follow up in the office.

## 2022-12-12 ENCOUNTER — TELEPHONE (OUTPATIENT)
Dept: FAMILY MEDICINE CLINIC | Age: 57
End: 2022-12-12

## 2022-12-12 NOTE — TELEPHONE ENCOUNTER
Wife called stating that pt was in [facility]. V's 12/8/22 discharged 12/11/22. Dx Heart attack. Appt scheduled 12/13/22. No questions at the time.

## 2022-12-13 ENCOUNTER — OFFICE VISIT (OUTPATIENT)
Dept: FAMILY MEDICINE CLINIC | Age: 57
End: 2022-12-13

## 2022-12-13 VITALS
SYSTOLIC BLOOD PRESSURE: 120 MMHG | RESPIRATION RATE: 16 BRPM | TEMPERATURE: 97.6 F | HEART RATE: 88 BPM | WEIGHT: 264 LBS | OXYGEN SATURATION: 95 % | DIASTOLIC BLOOD PRESSURE: 78 MMHG | BODY MASS INDEX: 36.82 KG/M2

## 2022-12-13 DIAGNOSIS — E61.1 IRON DEFICIENCY: ICD-10-CM

## 2022-12-13 DIAGNOSIS — Z09 HOSPITAL DISCHARGE FOLLOW-UP: Primary | ICD-10-CM

## 2022-12-13 DIAGNOSIS — I21.11 ST ELEVATION MYOCARDIAL INFARCTION INVOLVING RIGHT CORONARY ARTERY (HCC): ICD-10-CM

## 2022-12-13 DIAGNOSIS — E55.9 VITAMIN D DEFICIENCY: ICD-10-CM

## 2022-12-13 DIAGNOSIS — E78.00 PURE HYPERCHOLESTEROLEMIA: ICD-10-CM

## 2022-12-13 DIAGNOSIS — E11.9 TYPE 2 DIABETES MELLITUS WITHOUT COMPLICATION, WITHOUT LONG-TERM CURRENT USE OF INSULIN (HCC): ICD-10-CM

## 2022-12-13 DIAGNOSIS — Z00.00 PREVENTATIVE HEALTH CARE: ICD-10-CM

## 2022-12-13 LAB — HBA1C MFR BLD: 8.1 %

## 2022-12-13 NOTE — PROGRESS NOTES
Post-Discharge Transitional Care  Follow Up      Sherrie Hernandez   YOB: 1965  Date of office Visit:  12/13/2022  Date of Hospital Admission: 12/8/22  Date of Hospital Discharge: 12/11/22  Risk of hospital readmission (high >=14%. Medium >=10%) :Readmission Risk Score: 5.2      Care management risk score Rising risk (score 2-5) and Complex Care (Scores >=6): No Risk Score On File     Non face to face  following discharge, date last encounter closed (first attempt may have been earlier): 12/12/2022    Call initiated 2 business days of discharge: 12/12/2022 1:53 p.m. by Lisa Jorge MA.     ASSESSMENT/PLAN:   Hospital discharge follow-up  -     MS DISCHARGE MEDS RECONCILED W/ CURRENT OUTPATIENT MED LIST    ST elevation myocardial infarction involving right coronary artery ([facility] [address]  - Stable: Medication re-filled as needed, con't medications as prescribed, con't current tx plan  - continue brillinta, lopressor and aspirin as prescribed. - continue to follow up with cardiology. Pure hypercholesterolemia  - Stable: Medication re-filled as needed, con't medications as prescribed, con't current tx plan  - Lifestyle changes: Decrease fats, sugars, carbohydrates, and increase routine exercise, try to get 150 minutes of aerobic activity a week and watch calorie portions and to limit her carbs in her diet. - Foods that helps increase HDL include the following: Fish, nuts, flax, avocados, and legumes (such as soy, kidney beans, chickpeas). - Will cont with low fat/chol diet and lipitor as ordered. Vitamin D deficiency  Iron deficiency  - will get labs to further evaluate. Type 2 diabetes mellitus without complication, without long-term current use of insulin (HCC)  - A1c 8.1  - Continue with Metformin and will start Januvia as previously prescribed. Discussed medication. - discussed starting ozempic but pt is going to check with work first to make sure that he ok with his policy.    - Cont with daily foot exams and yearly eye exams. Preventative health care  -     CBC; Future  -     Comprehensive Metabolic Panel; Future  -     PSA Screening; Future    Medical Decision Making: high complexity    Return in 3 months (on 3/13/2023). On this date 12/13/2022 I have spent 45 minutes reviewing previous notes, test results and face to face with the patient discussing the diagnosis and importance of compliance with the treatment plan as well as documenting on the day of the visit. Subjective:   HPI:  Follow up of Hospital problems/diagnosis(es): NSTEMI, DM    Inpatient course: Discharge summary reviewed- see chart. Interval history/Current status: pt doing well overall, not currently having any CP or SOB. Pt would like to discuss metformin and changing DM medication. Patient Active Problem List   Diagnosis    Essential hypertension    Class 2 severe obesity due to excess calories with serious comorbidity and body mass index (BMI) of 38.0 to 38.9 in adult [facility])    Anxiety and depression    Positive colorectal cancer screening using Cologuard test    Elevated ALT measurement    Pure hypercholesterolemia    Umbilical hernia    Ventral hernia    Right inguinal hernia    Trigger finger, right middle finger    Degenerative joint disease of right acromioclavicular joint    Infraspinatus tendinitis, right    Abnormal prostate exam- calcifications shown on CT ABD/pelvis 7-10-19    Alcohol use disorder, mild, abuse    Former smoker    Gastroesophageal reflux disease without esophagitis    Type 2 diabetes mellitus without complication, without long-term current use of insulin ([address]    STEMI (ST elevation myocardial infarction) ([facility] [address]       Medications listed as ordered at the time of discharge from hospital     Medication List            Accurate as of December 13, 2022  4:52 PM. If you have any questions, ask your nurse or doctor.                 START taking these medications      empagliflozin 10 MG tablet  Commonly known as: Jardiance  Take 1 tablet by mouth daily  Started by: ART Almonte CNP            CONTINUE taking these medications      aspirin 81 MG EC tablet  Commonly known as: EQ Aspirin Adult Low Dose  Take 1 tablet by mouth daily     atorvastatin 80 MG tablet  Commonly known as: LIPITOR  Take 1 tablet by mouth nightly     ibuprofen 800 MG tablet  Commonly known as: ADVIL;MOTRIN  Take 1 tablet by mouth every 6 hours as needed for Pain     metFORMIN 1000 MG tablet  Commonly known as: GLUCOPHAGE  TAKE 1 TABLET BY MOUTH TWICE DAILY WITH MEALS     metoprolol tartrate 25 MG tablet  Commonly known as: LOPRESSOR  Take 2 tablets by mouth 2 times daily     omeprazole 40 MG delayed release capsule  Commonly known as: PRILOSEC  Take 1 capsule by mouth once daily     sertraline 50 MG tablet  Commonly known as: ZOLOFT  Take 1.5 tablets by mouth daily     ticagrelor 90 MG Tabs tablet  Commonly known as: BRILINTA  Take 1 tablet by mouth 2 times daily               Where to Get Your Medications        These medications were sent to [address] [facility], [address] [address]      Phone: [phone]   empagliflozin 10 MG tablet           Medications marked \"taking\" at this time  Outpatient Medications Marked as Taking for the 12/13/22 encounter (Office Visit) with ART Almonte CNP   Medication Sig Dispense Refill    metoprolol tartrate (LOPRESSOR) 25 MG tablet Take 2 tablets by mouth 2 times daily 60 tablet 3    atorvastatin (LIPITOR) 80 MG tablet Take 1 tablet by mouth nightly 30 tablet 3    ticagrelor (BRILINTA) 90 MG TABS tablet Take 1 tablet by mouth 2 times daily 60 tablet 3    aspirin (EQ ASPIRIN ADULT LOW DOSE) 81 MG EC tablet Take 1 tablet by mouth daily 90 tablet 0    metFORMIN (GLUCOPHAGE) 1000 MG tablet TAKE 1 TABLET BY MOUTH TWICE DAILY WITH MEALS 180 tablet 0    omeprazole (PRILOSEC) 40 MG delayed release capsule Take 1 capsule by mouth once daily 90 capsule 0    sertraline (ZOLOFT) 50 MG tablet Take 1.5 tablets by mouth daily 135 tablet 0    ibuprofen (ADVIL;MOTRIN) 800 MG tablet Take 1 tablet by mouth every 6 hours as needed for Pain 90 tablet 1        Medications patient taking as of now reconciled against medications ordered at time of hospital discharge: Yes    A comprehensive review of systems was negative except for what was noted in the HPI. Objective:    /78   Pulse 88   Temp 97.6 °F (36.4 °C)   Resp 16   Wt 264 lb (119.7 kg)   SpO2 95%   BMI 36.82 kg/m²   General Appearance: alert and oriented to person, place and time, well developed and well- nourished, in no acute distress  Skin: warm and dry, no rash or erythema  Head: normocephalic and atraumatic  Eyes: pupils equal, round, and reactive to light, extraocular eye movements intact, conjunctivae normal  ENT: tympanic membrane, external ear and ear canal normal bilaterally, nose without deformity, nasal mucosa and turbinates normal without polyps  Neck: supple and non-tender without mass, no thyromegaly or thyroid nodules, no cervical lymphadenopathy  Pulmonary/Chest: clear to auscultation bilaterally- no wheezes, rales or rhonchi, normal air movement, no respiratory distress  Cardiovascular: normal rate, regular rhythm, normal S1 and S2, no murmurs, rubs, clicks, or gallops, distal pulses intact, no carotid bruits  Abdomen: soft, non-tender, non-distended, normal bowel sounds, no masses or organomegaly  Extremities: no cyanosis, clubbing or edema  Musculoskeletal: normal range of motion, no joint swelling, deformity or tenderness  Neurologic: reflexes normal and symmetric, no cranial nerve deficit, gait, coordination and speech normal    An electronic signature was used to authenticate this note.   --Mariaelena Tee, APRN - CNP

## 2022-12-13 NOTE — TELEPHONE ENCOUNTER
Care Transitions Initial Follow Up Call    Outreach made within 2 business days of discharge: Yes    Patient: Denise Lai Patient : 1965   MRN: [id number]  Reason for Admission: There are no discharge diagnoses documented for the most recent discharge. Discharge Date: 22       Spoke with: Patients wife    Discharge department/facility: [facility] Interactive Patient Contact:  Was patient able to fill all prescriptions: Yes  Was patient instructed to bring all medications to the follow-up visit: Yes  Is patient taking all medications as directed in the discharge summary?  Yes  Does patient understand their discharge instructions: Yes  Does patient have questions or concerns that need addressed prior to 7-14 day follow up office visit: no    Scheduled appointment with PCP within 7-14 days    Follow Up  Future Appointments   Date Time Provider Hipolito Granado   3/13/2023  8:00 AM Hero Jain MA

## 2022-12-14 LAB
EKG ATRIAL RATE: 93 BPM
EKG P AXIS: 32 DEGREES
EKG P-R INTERVAL: 162 MS
EKG Q-T INTERVAL: 334 MS
EKG QRS DURATION: 82 MS
EKG QTC CALCULATION (BAZETT): 415 MS
EKG R AXIS: 21 DEGREES
EKG T AXIS: 121 DEGREES
EKG VENTRICULAR RATE: 93 BPM

## 2022-12-21 DIAGNOSIS — I10 ESSENTIAL HYPERTENSION: ICD-10-CM

## 2022-12-21 RX ORDER — METOPROLOL TARTRATE 50 MG/1
TABLET, FILM COATED ORAL
Qty: 90 TABLET | Refills: 0 | OUTPATIENT
Start: 2022-12-21

## 2023-01-18 ENCOUNTER — HOSPITAL ENCOUNTER (OUTPATIENT)
Dept: CARDIAC CATH/INVASIVE PROCEDURES | Age: 58
Discharge: HOME | End: 2023-01-18
Payer: COMMERCIAL

## 2023-01-18 VITALS
BODY MASS INDEX: 36.82 KG/M2 | SYSTOLIC BLOOD PRESSURE: 131 MMHG | WEIGHT: 263 LBS | TEMPERATURE: 97.7 F | OXYGEN SATURATION: 95 % | HEART RATE: 100 BPM | RESPIRATION RATE: 24 BRPM | DIASTOLIC BLOOD PRESSURE: 87 MMHG | HEIGHT: 71 IN

## 2023-01-18 LAB
EGFR, POC: >60 ML/MIN/1.73M2
GLUCOSE BLD-MCNC: 163 MG/DL (ref 74–100)
PLATELET # BLD: 256 K/UL (ref 138–453)
POC BUN: 17 MG/DL (ref 8–26)
POC CHLORIDE: 105 MMOL/L (ref 98–107)
POC CREATININE: 0.73 MG/DL (ref 0.51–1.19)
POC HEMATOCRIT: 52 % (ref 41–53)
POC HEMOGLOBIN: 17.5 G/DL (ref 13.5–17.5)
POC POTASSIUM: 4.4 MMOL/L (ref 3.5–4.5)
POC SODIUM: 139 MMOL/L (ref 138–146)

## 2023-01-18 PROCEDURE — 7100000000 HC PACU RECOVERY - FIRST 15 MIN

## 2023-01-18 PROCEDURE — 6360000002 HC RX W HCPCS

## 2023-01-18 PROCEDURE — 92928 PRQ TCAT PLMT NTRAC ST 1 LES: CPT

## 2023-01-18 PROCEDURE — 84520 ASSAY OF UREA NITROGEN: CPT

## 2023-01-18 PROCEDURE — 7100000001 HC PACU RECOVERY - ADDTL 15 MIN

## 2023-01-18 PROCEDURE — C1725 CATH, TRANSLUMIN NON-LASER: HCPCS

## 2023-01-18 PROCEDURE — 82947 ASSAY GLUCOSE BLOOD QUANT: CPT

## 2023-01-18 PROCEDURE — C1892 INTRO/SHEATH,FIXED,PEEL-AWAY: HCPCS

## 2023-01-18 PROCEDURE — 93454 CORONARY ARTERY ANGIO S&I: CPT

## 2023-01-18 PROCEDURE — C1769 GUIDE WIRE: HCPCS

## 2023-01-18 PROCEDURE — 85014 HEMATOCRIT: CPT

## 2023-01-18 PROCEDURE — 99152 MOD SED SAME PHYS/QHP 5/>YRS: CPT

## 2023-01-18 PROCEDURE — 2709999900 HC NON-CHARGEABLE SUPPLY

## 2023-01-18 PROCEDURE — 99153 MOD SED SAME PHYS/QHP EA: CPT

## 2023-01-18 PROCEDURE — 84295 ASSAY OF SERUM SODIUM: CPT

## 2023-01-18 PROCEDURE — C1887 CATHETER, GUIDING: HCPCS

## 2023-01-18 PROCEDURE — 82435 ASSAY OF BLOOD CHLORIDE: CPT

## 2023-01-18 PROCEDURE — C1894 INTRO/SHEATH, NON-LASER: HCPCS

## 2023-01-18 PROCEDURE — 85049 AUTOMATED PLATELET COUNT: CPT

## 2023-01-18 PROCEDURE — 6370000000 HC RX 637 (ALT 250 FOR IP)

## 2023-01-18 PROCEDURE — C1874 STENT, COATED/COV W/DEL SYS: HCPCS

## 2023-01-18 PROCEDURE — 84132 ASSAY OF SERUM POTASSIUM: CPT

## 2023-01-18 PROCEDURE — 6360000004 HC RX CONTRAST MEDICATION

## 2023-01-18 PROCEDURE — 82565 ASSAY OF CREATININE: CPT

## 2023-01-18 RX ORDER — SODIUM CHLORIDE 0.9 % (FLUSH) 0.9 %
5-40 SYRINGE (ML) INJECTION EVERY 12 HOURS SCHEDULED
Status: DISCONTINUED | OUTPATIENT
Start: 2023-01-18 | End: 2023-01-19 | Stop reason: HOSPADM

## 2023-01-18 RX ORDER — SODIUM CHLORIDE 0.9 % (FLUSH) 0.9 %
5-40 SYRINGE (ML) INJECTION PRN
Status: DISCONTINUED | OUTPATIENT
Start: 2023-01-18 | End: 2023-01-19 | Stop reason: HOSPADM

## 2023-01-18 RX ORDER — SODIUM CHLORIDE 9 MG/ML
INJECTION, SOLUTION INTRAVENOUS PRN
Status: DISCONTINUED | OUTPATIENT
Start: 2023-01-18 | End: 2023-01-19 | Stop reason: HOSPADM

## 2023-01-18 NOTE — PROGRESS NOTES
Arterial line dc'd per writer. Manual pressure held for 20 minutes. Patient tolerated well. No hematoma or drainage noted. Band aid applied to area.

## 2023-01-18 NOTE — OP NOTE
[facility]    CARDIAC CATHETERIZATION    Date:   1/18/2023  Patient name:  Duong Nielson  Date of admission:  1/18/2023  8:31 AM  MRN:   [id number]  YOB: 1965    Operators:  Primary:   Ivania Loredo MD (Attending Physician)    Assistant/CV fellow:  Emani Richardson MD      Procedure performed:     [x] Left Heart Catheterization. [] Graft Angiography.  [] Left Ventriculography. [] Right Heart Catheterization. [] Coronary Angiography. [] Aortic Valve Studies. [x] PCI:      [] Other:       Pre Procedure Conscious Sedation Data:  ASA Class:    [] I [] II [x] III [] IV    Mallampati Class:  [x] I [] II [] III [] IV      Indication:  Staged PCI of LAD       Procedure:  Access:  [x] Femoral  [] Radial  artery       [x] Right  [] Left    Procedure: After informed consent was obtained with explanation of the risks and benefits, patient was brought to the cath lab. The access area was prepped and draped in sterile fashion. 1% lidocaine was used for local block. The artery was cannulated with 7  Fr sheath with brisk arterial blood return. The side port was frequently flushed and aspirated with normal saline. Findings:   Cardiac Arteries and Lesion Findings       LAD: 80% proximal stenosis. Reduced to 0% using 3.5x18 mm Resolute BRIAN. PD   using 3.5 mm NC balloon. 40% mid segment stenosis. 60% ostial stenosis of   first diagonal and will be treated medically. Lesion on Prox LAD: Proximal subsection. 80% stenosis 14 mm length reduced     to 0%. Pre procedure MARTHA III flow was noted. Post Procedure MARTHA III     flow was present. Good runoff was present. The lesion was diagnosed as     Moderate Risk (B). Devices used         - Runthrough NS. Number of passes: 1.         - Euphora Balloon 3.0mm x 12mm. 1 inflation(s) to a max pressure of: 10     zayda. - Resolute Adan 3.5 x 18 BRIAN. 1 inflation(s) to a max pressure of: 12     zayda.          - NC Euphora Balloon 3.5mm x 12mm. 2 inflation(s) to a max pressure of:     18 zayda. Conclusions        Procedure Summary        LAD 80% proximal stenosis. Reduced to 0% using 3.5x18 mm Resolute BRIAN. PD    using 3.5 mm NC balloon. 40% mid segment stenosis. 60% ostial stenosis of    first diagonal and will be treated medically. Recommendations        DAPT and risk factor modifications. Signature   Estimated Blood Loss: 10 mL        ____________________________________________________________________    History and Risk Factors    [x] Hypertension     [] Family history of CAD  [x] Hyperlipidemia     [] Cerebrovascular Disease   [] Prior MI       [] Peripheral Vascular disease   [x] Prior PCI              [] Diabetes Mellitus    [] Left Main PCI. [] Currently on Dialysis. [] Prior CABG. [] Currently smoker. [] Cardiac Arrest outside of healthcare facility. [] Yes    [x] No        Witnessed     [] Yes   [] No     Arrest after arrival of EMS  [] Yes   [] No     [] Cardiac Arrest at other Facility. [] Yes   [] No    Pre-Procedure Information. Heart Failure       [] Yes    [x] No        Class  [] I      [] II  [] III    [] IV. New Diagnosis    [] Yes  [] No    HF Type      [] Systolic   [] Diastolic          [] Unknown. Diagnostic Test:   EKG       [] Normal   [x] Abnormal    New antiarrhythmia medications:    [] Yes   [] No   New onset atrial fibrillation / Flutter     [] Yes   [] No   ECG Abnormalities:      [] V. Fib   [] Shelli V. Tach           [] NS V. T   [] New LBBB           [] T.  Inv  []  ST dev > 0.5 mm         [] PVC's freq  [] PVC's infrequent    Stress Test Performed:      [] Yes    [x] No     Type:     [] Stress Echo   [] Exercise Stress Test (no imaging)      [] Stress Nuclear  [] Stress Imaging     Results   [] Negative   [] Positive        [] Indeterminate  [] Unavailable     If Positive/ Risk / Extent of Ischemia:       [] Low  [] Intermediate         [] High  [] Unavailable      Cardiac CTA Performed:     [] Yes    [x] No      Results   [] CAD   [] Non obstructive CAD      [] No CAD   [] Uncertain      [] Unknown   [] Structural Disease. Pre Procedure Medications:   [x] Yes    [] No         [x] ASA   [] Beta Blockers      [] Nitrate   [] Ca Channel Blockers      [] Ranolazine   [] Statin       [] Plavix/Others antiplatelets      Electronically signed on 1/18/2023 at 11:07 AM by:    Larry Johnson MD  Fellow, [facility].  Mariela Oliveira [facility]

## 2023-01-18 NOTE — PROGRESS NOTES
Ambulated in gómez with isaiah downey well, patient returned to room ,IV  discontinued , patient sitting at side of bed and stated felt dizzy and sl nauseated. Writer had patient lay down,Bed position in trendelenburg. Orthostatic pressures taken. See Epic. Dr Lida Orellana  visited at bedside. Ok to go home after 30 min. Patient without any complaints. No hematoma noted. No neuro deficits noted. Patient states of needing to move his bowels ,ok with Dr to have writer walk him to bathroom.

## 2023-01-18 NOTE — DISCHARGE INSTRUCTIONS
Discharge Instructions for angiogram  Lili Nixon 62 to shower in AM. Discontinue band aid in AM.   Do not apply further band aids. Keep clean, dry and open to air. No powder or lotion. Do not soak in a pool or tub and do not swim for one week. If there is any bleeding at the catheter site, apply firm pressure with your hands until the bleeding stops. If bleeding continues after 3 minutes call 911. If there is any swelling or firm areas at your puncture site, this could be bleeding under the skin(hematoma), and if you have any concerns seek help immediately. Drink plenty of fluids after the test. This will flush the x-ray dye from your system. Return to your normal diet. The sedative will make you sleepy. Rest until the effects have worn off. Ask your doctor when you will be able to return to work. Avoid heavy lifting objects greater than 10  pounds, physically demanding activities, and sexual activity for 5-7 days. Your activity will also depend upon where the catheter was inserted: Do not sit for long periods of time. Try to change positions frequently. Medications   If advised by your doctor, resume taking your normal medicines. Use acetaminophen (Tylenol) for pain relief. Do not take metformin (Glucophage) or glyburide and metformin (Glucovance) for 48 hours after the test.    If you had to stop taking these medications before the procedure, ask your doctor when you can resume taking them:   If youAnti-inflammatory drugs (eg, ibuprofen )   Blood thinners, such as warfarin (Coumadin)   Clopidogrel (Plavix)   If you are taking medicines, follow these general guidelines:   Take your medicine as directed. Do not change the amount or the schedule. Do not stop taking them without talking to your doctor. Do not share them. Know what the results and side effects. Report them to your doctor. Some drugs can be dangerous when mixed.  Talk to a doctor or pharmacist if you are taking more than one drug. This includes over-the-counter medicine and herb or dietary supplements. Plan ahead for refills so you don't run out. Call Your Doctor If Any of the Following Occurs     :   Signs of infection, including fever and chills   Redness, swelling, increasing pain, excessive bleeding, or any discharge from the catheter insertion site   CALL 911 if you have symptoms including:   Drooping facial muscles   Changes in vision or speech   Difficulty walking or using your limbs   Change in sensation to affected leg, including numbness, feeling cold, or change in color   Extreme sweating, nausea or vomiting   Dizziness or lightheadedness   Chest pain   Rapid, irregular heartbeat   Palpitations   Cough, shortness of breath, or difficulty breathing   Weakness or fainting   If you think you have an emergency, CALL 911 . Coronary artery disease (CAD) occurs when plaque builds up in the arteries that bring oxygen-rich blood to your heart. Plaque is a fatty substance made of cholesterol, calcium, and other substances in the blood. This process is called hardening of the arteries, or atherosclerosis. What happens when you have coronary artery disease? Plaque may narrow the coronary arteries. Narrowed arteries cause poor blood flow. This can lead to angina symptoms such as chest pain or discomfort. If blood flow is completely blocked, you could have a heart attack. You can slow CAD and reduce the risk of future problems by making changes in your lifestyle. These include quitting smoking and eating heart-healthy foods. Treatments for CAD, along with changes in your lifestyle, can help you live a longer and healthier life. How can you prevent coronary artery disease? Do not smoke. It may be the best thing you can do to prevent heart disease. If you need help quitting, talk to your doctor about stop-smoking programs and medicines.  These can increase your chances of quitting for good. Be active. Get at least 30 minutes of exercise on most days of the week. Walking is a good choice. You also may want to do other activities, such as running, swimming, cycling, or playing tennis or team sports. Eat heart-healthy foods. Eat more fruits and vegetables and less foods that contain saturated and trans fats. Limit alcohol, sodium, and sweets. Stay at a healthy weight. Lose weight if you need to. Manage other health problems such as diabetes, high blood pressure, and high cholesterol. Talk to your doctor about taking a daily aspirin. Manage stress. Stress can hurt your heart. To keep stress low, talk about your problems and feelings. Don't keep your feelings hidden. How is coronary artery disease treated? Your doctor will suggest that you make lifestyle changes. For example, your doctor may ask you to eat healthy foods, quit smoking, lose extra weight, and be more active. You will have to take medicines. Your doctor may suggest a procedure to open narrowed or blocked arteries. This is called angioplasty. Or your doctor may suggest using healthy blood vessels to create detours around narrowed or blocked arteries. This is called bypass surgery. Follow-up care is a key part of your treatment and safety. Be sure to make and go to all appointments, and call your doctor if you are having problems. It's also a good idea to know your test results and keep a list of the medicines you take. Where can you learn more? Go to https://ok.[employer]. org and sign in to your [employer] account. Enter [phone] in the [facility] box to learn more about Learning About Coronary Artery Disease (CAD).     If you do not have an account, please click on the Sign Up Now link. © 0224-7534 Healthwise, Incorporated. Care instructions adapted under license by [facility] ([facility]).  This care instruction is for use with your licensed healthcare professional. If you have questions about a medical condition or this instruction, always ask your healthcare professional. [place] [address] any warranty or liability for your use of this information. Content Version: 26.2.061769; Current as of: February 20, 2015      Discharge Instructions      SEDATION / ANALGESIA INFORMATION / Cortney Yudelka Macario have received the sedation/analgesia medication during your visit    Sedation/analgesia is used during short medical procedures under controlled supervision. The medication will produce a strong relaxation. You will be able to hear, speak and follow instructions, but your memory and alertness will be decreased. You will be able to swallow and breathe on your own. During sedation/analgesia your blood pressure, heart and breathing will be watched closely. After the procedure, you may not remember what was said or done. You may have the following effects from the medication. \" Drowsiness, dizziness, sleepiness or confusion. \" Difficulty remembering or delayed reaction times. \" Loss of fine muscle control or difficulty with your balance especially while walking. \" Difficulty focusing or blurred vision. You may not be aware of slight changes in your behavior and/or your reaction time because of the medication used during the procedure. Therefore you should follow these instructions. \" Have someone responsible help you with your care. \" Do not drive for 24 hours. \" Do not operate equipment for 24 hours (lawnmowers, power tools, kitchen accessories, stove). \" Do not drink any alcoholic beverages for a minimum of 24 hours. \" Do not make important personal, legal or business decisions for 24 hours. \" You may experience dizziness or lightheadedness. Move slowly and carefully, do not make sudden position changes. \" Drink extra amounts of fluids today. \" Increase your diet as tolerated (unless you have received specific instructions from your doctor).   \" If you feel nauseated, continue with liquids until the nausea is gone. \" Notify your physician if you have not urinated within 8 hours after the procedure. \" Resume your medications unless otherwise instructed       [] DISCHARGE INSTRUCTIONS GIVEN WITH 2 WEEK APPT. MADE AND . DOCUMENTED     [x] STENT/PCI GUIDELINES GIVEN    [x] ASA  PRESCRIBED POST PROCEDURE    [x] WAS A BETA BLOCKER ORDERED IF YES WAS SCRIPT GIVEN TO PT.    [x] IF EF < 45 % WAS AN ACE INHIBITOR ORDERED    [x] WAS PLAVIX,EFFIENT,BRILINTA, ORDERED IF YES WAS SCRIPT GIVEN TO       PT,AND INSTRUCTIONS ON IMPORTANCE OF MED    [x] DOES PT. NEED 30 & 90 DAY SCRIPTS    [x] CONCERNS ON PURCHASE OF ANTIPLATELETS IF YES.  SEE PROCEDURE ON      PROCESS FOR PT.S TO OBTAIN THESE MEDS    [x] IS PT. ON STATINS IF NO WERE STATINS ORDERED AND SCRIPT GIVEN    [x] WERE MEDS RECONCILED, WAS [employer] INFORMATION ON MEDS GIVEN     TO PT.    [x] PRINT DISCHARGE MED LIST CHECK AVS FOR CURRENT MEDS    [x] WAS STENT CARD GIVEN TO PT.

## 2023-01-18 NOTE — PROGRESS NOTES
Patient admitted, consent signed and questions answered. Patient ready for procedure. Call light to reach with side rails up 2 of 2. Bilateral groins clipped with writer and Arjun Baker present. family at bedside with patient. History and physical updated.

## 2023-01-18 NOTE — H&P
Attestation signed by      Attending Physician Statement:    I have discussed the care of  Duane Ribeiro , including pertinent history and exam findings, with the Cardiology fellow/resident. I have seen and examined the patient and the key elements of all parts of the encounter have been performed by me. I agree with the assessment, plan and orders as documented by the fellow/resident, after I modified exam findings and plan of treatments, and the final version is my approved version of the assessment. Additional Comments: [facility]    Consult / H&P               Today's Date: 1/18/2023  Patient Name: Duane Ribeiro  Date of admission: No admission date for patient encounter. Patient's age: 65 y.o., 1965  Admission Dx: No admission diagnoses are documented for this encounter. Reason for Consult:  Cardiac evaluation    Requesting Physician: No admitting provider for patient encounter. CHIEF COMPLAINT:  Elective PCI     History Obtained From:  patient    HISTORY OF PRESENT ILLNESS:      The patient is a 65 y.o. male is here for elective PCI  H/o recent inferior STEMI s/p PCI to RCA     Past Medical History:   has a past medical history of Arthritis, Diabetes mellitus ([address] GERD (gastroesophageal reflux disease), Hyperlipidemia, Hypertension, Kidney stone, Obesity, and Positive colorectal cancer screening using Cologuard test.    Past Surgical History:   has a past surgical history that includes Hand surgery; [place] tooth extraction; Colonoscopy (N/A, 7/30/2019); Shoulder arthroscopy (Right, 02/06/2020); Shoulder arthroscopy (Right, 2/6/2020); Biceps tendon repair (Right, 2/6/2020); Cystoscopy (Right, 8/18/2020); Lithotripsy; hernia repair (11/06/2020); and hernia repair (N/A, 11/6/2020). Home Medications:    Prior to Admission medications    Medication Sig Start Date End Date Taking?  Authorizing Provider   empagliflozin (JARDIANCE) 10 MG tablet Take 1 tablet by mouth daily 12/13/22   ART Casas CNP   metoprolol tartrate (LOPRESSOR) 25 MG tablet Take 2 tablets by mouth 2 times daily 12/11/22   Nimesh Carrera MD   atorvastatin (LIPITOR) 80 MG tablet Take 1 tablet by mouth nightly 12/11/22   Nimesh Carrera MD   ticagrelor (BRILINTA) 90 MG TABS tablet Take 1 tablet by mouth 2 times daily 12/11/22   Nimesh Carrera MD   aspirin [facility] ASPIRIN ADULT LOW DOSE) 81 MG EC tablet Take 1 tablet by mouth daily 11/16/22   ART Casas CNP   metFORMIN (GLUCOPHAGE) 1000 MG tablet TAKE 1 TABLET BY MOUTH TWICE DAILY WITH MEALS 11/16/22   ART Casas CNP   omeprazole (PRILOSEC) 40 MG delayed release capsule Take 1 capsule by mouth once daily 11/16/22   ART Casas CNP   sertraline (ZOLOFT) 50 MG tablet Take 1.5 tablets by mouth daily 11/16/22   ART Casas CNP   ibuprofen (ADVIL;MOTRIN) 800 MG tablet Take 1 tablet by mouth every 6 hours as needed for Pain 11/14/22 11/14/23  ART Rodriguez NP      No current facility-administered medications for this encounter. Allergies:  Patient has no known allergies. Social History:   reports that he quit smoking about 10 years ago. His smoking use included cigarettes. He has a 70.00 pack-year smoking history. He has never used smokeless tobacco. He reports current alcohol use of about 21.0 standard drinks per week. He reports that he does not use drugs. Family History: family history includes Diabetes in his father and mother. No h/o sudden cardiac death. No for premature CAD    REVIEW OF SYSTEMS:    Constitutional: there has been no unanticipated weight loss. There's been No change in energy level, No change in activity level. Eyes: No visual changes or diplopia. No scleral icterus. ENT: No Headaches  Cardiovascular: No cardiac history  Respiratory: No previous pulmonary problems, No cough  Gastrointestinal: No abdominal pain.   No change in bowel or bladder habits. Genitourinary: No dysuria, trouble voiding, or hematuria. Musculoskeletal:  No gait disturbance, No weakness or joint complaints. Integumentary: No rash or pruritis. Neurological: No headache, diplopia, change in muscle strength, numbness or tingling. No change in gait, balance, coordination, mood, affect, memory, mentation, behavior. Psychiatric: No anxiety, or depression. Endocrine: No temperature intolerance. No excessive thirst, fluid intake, or urination. No tremor. Hematologic/Lymphatic: No abnormal bruising or bleeding, blood clots or swollen lymph nodes. Allergic/Immunologic: No nasal congestion or hives. PHYSICAL EXAM:      There were no vitals taken for this visit. Constitutional and General Appearance: alert, cooperative, no distress and appears stated age  [de-identified]: PERRL, no cervical lymphadenopathy. No masses palpable. Normal oral mucosa  Respiratory:  Normal excursion and expansion without use of accessory muscles  Resp Auscultation: Good respiratory effort. No for increased work of breathing. On auscultation: clear to auscultation bilaterally  Cardiovascular: The apical impulse is not displaced  Heart tones are crisp and normal. regular S1 and S2.  Jugular venous pulsation Normal  The carotid upstroke is normal in amplitude and contour without delay or bruit  Peripheral pulses are symmetrical and full   Abdomen:   No masses or tenderness  Bowel sounds present  Extremities:   No Cyanosis or Clubbing   Lower extremity edema: No   Skin: Warm and dry  Neurological:  Alert and oriented. Moves all extremities well  No abnormalities of mood, affect, memory, mentation, or behavior are noted        CBC: No results for input(s): WBC, HGB, HCT, PLT in the last 72 hours. BMP: No results for input(s): NA, K, CO2, BUN, CREATININE, LABGLOM, GLUCOSE in the last 72 hours. BNP: No results for input(s): BNP in the last 72 hours.   PT/INR: No results for input(s): PROTIME, INR in the last 72 hours.  APTT:No results for input(s): APTT in the last 72 hours. CARDIAC ENZYMES:No results for input(s): CKTOTAL, CKMB, CKMBINDEX, TROPONINI in the last 72 hours. FASTING LIPID PANEL:  Lab Results   Component Value Date/Time    HDL 43 04/13/2022 10:34 AM    LDLCALC 112 06/13/2019 12:00 AM    TRIG 124 04/13/2022 10:34 AM     LIVER PROFILE:No results for input(s): AST, ALT, LABALBU in the last 72 hours. IMPRESSION:    Patient Active Problem List   Diagnosis    Essential hypertension    Class 2 severe obesity due to excess calories with serious comorbidity and body mass index (BMI) of 38.0 to 38.9 in adult [facility])    Anxiety and depression    Positive colorectal cancer screening using Cologuard test    Elevated ALT measurement    Pure hypercholesterolemia    Umbilical hernia    Ventral hernia    Right inguinal hernia    Trigger finger, right middle finger    Degenerative joint disease of right acromioclavicular joint    Infraspinatus tendinitis, right    Abnormal prostate exam- calcifications shown on CT ABD/pelvis 7-10-19    Alcohol use disorder, mild, abuse    Former smoker    Gastroesophageal reflux disease without esophagitis    Type 2 diabetes mellitus without complication, without long-term current use of insulin (HCC)    STEMI (ST elevation myocardial infarction) ([facility] [address]   Procedure Summary      LAD 80% proximal stenosis. 70% mid stenosis at bifurcation of large   diagonal branch. The diagonal has 80% ostial stenosis. RCA 99% mid stenosis reduced to 0% using 2.5x38 mm Resolute Alivia. PD using   3 mm NC balloon. 80% distal stenosis reduced to 0% using 2.5x15 mm   Resolute ALIVIA. Recommendations      DAPT and risk factor modification. Post cath orders. Echo. Staged PCI of LAd/Diagonal in 4-6 wks.               Staged PCI to LAD and D   HTN   Recent Inf STEMI s/p PCI to RCA   DM   HTN     RECOMMENDATIONS:  Proceed with planned PCI  Follow post PCI orders     Pre Procedure Conscious Sedation Data:  ASA Class:    [] I [] II [x] III [] IV    Mallampati Class:  [x] I [] II [] III [] IV        Discussed with patient and Nurse.     Electronically signed by Alane Gottron, MD on 1/18/2023 at 8:08 [address] [place] [address] [facility]      [phone]

## 2023-02-14 DIAGNOSIS — K21.9 GASTRIC REFLUX: ICD-10-CM

## 2023-02-14 DIAGNOSIS — F32.A ANXIETY AND DEPRESSION: ICD-10-CM

## 2023-02-14 DIAGNOSIS — F41.9 ANXIETY AND DEPRESSION: ICD-10-CM

## 2023-02-14 DIAGNOSIS — F10.10 ALCOHOL USE DISORDER, MILD, ABUSE: ICD-10-CM

## 2023-02-14 RX ORDER — ATORVASTATIN CALCIUM 80 MG/1
80 TABLET, FILM COATED ORAL NIGHTLY
Qty: 30 TABLET | Refills: 3 | Status: SHIPPED | OUTPATIENT
Start: 2023-02-14

## 2023-02-14 RX ORDER — OMEPRAZOLE 40 MG/1
CAPSULE, DELAYED RELEASE ORAL
Qty: 90 CAPSULE | Refills: 0 | Status: SHIPPED | OUTPATIENT
Start: 2023-02-14

## 2023-02-16 DIAGNOSIS — I10 ESSENTIAL HYPERTENSION: ICD-10-CM

## 2023-02-16 DIAGNOSIS — F10.10 ALCOHOL USE DISORDER, MILD, ABUSE: ICD-10-CM

## 2023-02-16 DIAGNOSIS — F41.9 ANXIETY AND DEPRESSION: ICD-10-CM

## 2023-02-16 DIAGNOSIS — F32.A ANXIETY AND DEPRESSION: ICD-10-CM

## 2023-02-16 RX ORDER — LOSARTAN POTASSIUM 100 MG/1
100 TABLET ORAL DAILY
Qty: 90 TABLET | Refills: 0 | OUTPATIENT
Start: 2023-02-16

## 2023-03-27 ENCOUNTER — HOSPITAL ENCOUNTER (OUTPATIENT)
Age: 58
Discharge: HOME | End: 2023-03-27
Payer: COMMERCIAL

## 2023-03-27 DIAGNOSIS — E78.00 PURE HYPERCHOLESTEROLEMIA: ICD-10-CM

## 2023-03-27 DIAGNOSIS — E55.9 VITAMIN D DEFICIENCY: ICD-10-CM

## 2023-03-27 DIAGNOSIS — E61.1 IRON DEFICIENCY: ICD-10-CM

## 2023-03-27 DIAGNOSIS — Z00.00 PREVENTATIVE HEALTH CARE: ICD-10-CM

## 2023-03-27 DIAGNOSIS — E11.9 TYPE 2 DIABETES MELLITUS WITHOUT COMPLICATION, WITHOUT LONG-TERM CURRENT USE OF INSULIN (HCC): ICD-10-CM

## 2023-03-27 LAB
25(OH)D3 SERPL-MCNC: 38.2 NG/ML
ALBUMIN SERPL-MCNC: 4.5 G/DL (ref 3.5–5.2)
ALBUMIN/GLOBULIN RATIO: 1.6 (ref 1–2.5)
ALP SERPL-CCNC: 109 U/L (ref 40–129)
ALT SERPL-CCNC: 37 U/L (ref 5–41)
ANION GAP SERPL CALCULATED.3IONS-SCNC: 14 MMOL/L (ref 9–17)
AST SERPL-CCNC: 28 U/L
BILIRUB SERPL-MCNC: 0.3 MG/DL (ref 0.3–1.2)
BUN SERPL-MCNC: 10 MG/DL (ref 6–20)
CALCIUM SERPL-MCNC: 9.7 MG/DL (ref 8.6–10.4)
CHLORIDE SERPL-SCNC: 106 MMOL/L (ref 98–107)
CHOLEST SERPL-MCNC: 141 MG/DL
CHOLESTEROL/HDL RATIO: 3.1
CO2 SERPL-SCNC: 22 MMOL/L (ref 20–31)
CREAT SERPL-MCNC: 0.84 MG/DL (ref 0.7–1.2)
EST. AVERAGE GLUCOSE BLD GHB EST-MCNC: 137 MG/DL
GFR SERPL CREATININE-BSD FRML MDRD: >60 ML/MIN/1.73M2
GLUCOSE SERPL-MCNC: 117 MG/DL (ref 70–99)
HBA1C MFR BLD: 6.4 % (ref 4–6)
HCT VFR BLD AUTO: 47.8 % (ref 40.7–50.3)
HDLC SERPL-MCNC: 45 MG/DL
HGB BLD-MCNC: 16 G/DL (ref 13–17)
IRON SATURATION: 26 % (ref 20–55)
IRON SERPL-MCNC: 89 UG/DL (ref 59–158)
LDLC SERPL CALC-MCNC: 68 MG/DL (ref 0–130)
MCH RBC QN AUTO: 32.7 PG (ref 25.2–33.5)
MCHC RBC AUTO-ENTMCNC: 33.5 G/DL (ref 28.4–34.8)
MCV RBC AUTO: 97.6 FL (ref 82.6–102.9)
NRBC AUTOMATED: 0 PER 100 WBC
PDW BLD-RTO: 12.7 % (ref 11.8–14.4)
PLATELET # BLD AUTO: 245 K/UL (ref 138–453)
PMV BLD AUTO: 11.3 FL (ref 8.1–13.5)
POTASSIUM SERPL-SCNC: 4.6 MMOL/L (ref 3.7–5.3)
PROSTATE SPECIFIC ANTIGEN: 1.52 NG/ML
PROT SERPL-MCNC: 7.4 G/DL (ref 6.4–8.3)
RBC # BLD: 4.9 M/UL (ref 4.21–5.77)
SODIUM SERPL-SCNC: 142 MMOL/L (ref 135–144)
TIBC SERPL-MCNC: 338 UG/DL (ref 250–450)
TRIGL SERPL-MCNC: 142 MG/DL
UNSATURATED IRON BINDING CAPACITY: 249 UG/DL (ref 112–347)
WBC # BLD AUTO: 10.6 K/UL (ref 3.5–11.3)

## 2023-03-27 PROCEDURE — 80053 COMPREHEN METABOLIC PANEL: CPT

## 2023-03-27 PROCEDURE — 36415 COLL VENOUS BLD VENIPUNCTURE: CPT

## 2023-03-27 PROCEDURE — 83550 IRON BINDING TEST: CPT

## 2023-03-27 PROCEDURE — 83540 ASSAY OF IRON: CPT

## 2023-03-27 PROCEDURE — 85027 COMPLETE CBC AUTOMATED: CPT

## 2023-03-27 PROCEDURE — 80061 LIPID PANEL: CPT

## 2023-03-27 PROCEDURE — 83036 HEMOGLOBIN GLYCOSYLATED A1C: CPT

## 2023-03-27 PROCEDURE — 82306 VITAMIN D 25 HYDROXY: CPT

## 2023-03-27 PROCEDURE — G0103 PSA SCREENING: HCPCS

## 2023-03-30 ENCOUNTER — OFFICE VISIT (OUTPATIENT)
Dept: FAMILY MEDICINE CLINIC | Age: 58
End: 2023-03-30
Payer: COMMERCIAL

## 2023-03-30 VITALS
OXYGEN SATURATION: 97 % | TEMPERATURE: 97.5 F | DIASTOLIC BLOOD PRESSURE: 78 MMHG | BODY MASS INDEX: 35.15 KG/M2 | RESPIRATION RATE: 16 BRPM | SYSTOLIC BLOOD PRESSURE: 126 MMHG | WEIGHT: 252 LBS | HEART RATE: 85 BPM

## 2023-03-30 DIAGNOSIS — F41.9 ANXIETY AND DEPRESSION: ICD-10-CM

## 2023-03-30 DIAGNOSIS — E11.9 TYPE 2 DIABETES MELLITUS WITHOUT COMPLICATION, WITHOUT LONG-TERM CURRENT USE OF INSULIN (HCC): ICD-10-CM

## 2023-03-30 DIAGNOSIS — E78.00 PURE HYPERCHOLESTEROLEMIA: ICD-10-CM

## 2023-03-30 DIAGNOSIS — I10 ESSENTIAL HYPERTENSION: Primary | ICD-10-CM

## 2023-03-30 DIAGNOSIS — E61.1 IRON DEFICIENCY: ICD-10-CM

## 2023-03-30 DIAGNOSIS — I21.11 ST ELEVATION MYOCARDIAL INFARCTION INVOLVING RIGHT CORONARY ARTERY (HCC): ICD-10-CM

## 2023-03-30 DIAGNOSIS — E55.9 VITAMIN D DEFICIENCY: ICD-10-CM

## 2023-03-30 DIAGNOSIS — F32.A ANXIETY AND DEPRESSION: ICD-10-CM

## 2023-03-30 PROBLEM — E66.01 CLASS 2 SEVERE OBESITY DUE TO EXCESS CALORIES WITH SERIOUS COMORBIDITY AND BODY MASS INDEX (BMI) OF 38.0 TO 38.9 IN ADULT (HCC): Status: RESOLVED | Noted: 2019-06-06 | Resolved: 2023-03-30

## 2023-03-30 PROBLEM — E66.812 CLASS 2 SEVERE OBESITY DUE TO EXCESS CALORIES WITH SERIOUS COMORBIDITY AND BODY MASS INDEX (BMI) OF 38.0 TO 38.9 IN ADULT: Status: RESOLVED | Noted: 2019-06-06 | Resolved: 2023-03-30

## 2023-03-30 PROCEDURE — 3078F DIAST BP <80 MM HG: CPT | Performed by: NURSE PRACTITIONER

## 2023-03-30 PROCEDURE — 3074F SYST BP LT 130 MM HG: CPT | Performed by: NURSE PRACTITIONER

## 2023-03-30 PROCEDURE — 3044F HG A1C LEVEL LT 7.0%: CPT | Performed by: NURSE PRACTITIONER

## 2023-03-30 PROCEDURE — 99214 OFFICE O/P EST MOD 30 MIN: CPT | Performed by: NURSE PRACTITIONER

## 2023-03-30 SDOH — ECONOMIC STABILITY: INCOME INSECURITY: HOW HARD IS IT FOR YOU TO PAY FOR THE VERY BASICS LIKE FOOD, HOUSING, MEDICAL CARE, AND HEATING?: NOT HARD AT ALL

## 2023-03-30 SDOH — ECONOMIC STABILITY: FOOD INSECURITY: WITHIN THE PAST 12 MONTHS, YOU WORRIED THAT YOUR FOOD WOULD RUN OUT BEFORE YOU GOT MONEY TO BUY MORE.: NEVER TRUE

## 2023-03-30 SDOH — ECONOMIC STABILITY: FOOD INSECURITY: WITHIN THE PAST 12 MONTHS, THE FOOD YOU BOUGHT JUST DIDN'T LAST AND YOU DIDN'T HAVE MONEY TO GET MORE.: NEVER TRUE

## 2023-03-30 SDOH — ECONOMIC STABILITY: HOUSING INSECURITY
IN THE LAST 12 MONTHS, WAS THERE A TIME WHEN YOU DID NOT HAVE A STEADY PLACE TO SLEEP OR SLEPT IN A SHELTER (INCLUDING NOW)?: NO

## 2023-03-30 ASSESSMENT — PATIENT HEALTH QUESTIONNAIRE - PHQ9
8. MOVING OR SPEAKING SO SLOWLY THAT OTHER PEOPLE COULD HAVE NOTICED. OR THE OPPOSITE, BEING SO FIGETY OR RESTLESS THAT YOU HAVE BEEN MOVING AROUND A LOT MORE THAN USUAL: 0
9. THOUGHTS THAT YOU WOULD BE BETTER OFF DEAD, OR OF HURTING YOURSELF: 0
2. FEELING DOWN, DEPRESSED OR HOPELESS: 1
10. IF YOU CHECKED OFF ANY PROBLEMS, HOW DIFFICULT HAVE THESE PROBLEMS MADE IT FOR YOU TO DO YOUR WORK, TAKE CARE OF THINGS AT HOME, OR GET ALONG WITH OTHER PEOPLE: 0
SUM OF ALL RESPONSES TO PHQ QUESTIONS 1-9: 1
SUM OF ALL RESPONSES TO PHQ QUESTIONS 1-9: 1
6. FEELING BAD ABOUT YOURSELF - OR THAT YOU ARE A FAILURE OR HAVE LET YOURSELF OR YOUR FAMILY DOWN: 0
SUM OF ALL RESPONSES TO PHQ QUESTIONS 1-9: 1
5. POOR APPETITE OR OVEREATING: 0
SUM OF ALL RESPONSES TO PHQ QUESTIONS 1-9: 1
7. TROUBLE CONCENTRATING ON THINGS, SUCH AS READING THE NEWSPAPER OR WATCHING TELEVISION: 0
3. TROUBLE FALLING OR STAYING ASLEEP: 0
4. FEELING TIRED OR HAVING LITTLE ENERGY: 0
1. LITTLE INTEREST OR PLEASURE IN DOING THINGS: 0
SUM OF ALL RESPONSES TO PHQ9 QUESTIONS 1 & 2: 1

## 2023-03-30 ASSESSMENT — ENCOUNTER SYMPTOMS
BACK PAIN: 0
COLOR CHANGE: 0
DIARRHEA: 0
NAUSEA: 0
CHEST TIGHTNESS: 0
CONSTIPATION: 0
COUGH: 0
RHINORRHEA: 0
ABDOMINAL DISTENTION: 0
SORE THROAT: 0
SHORTNESS OF BREATH: 0
ABDOMINAL PAIN: 0

## 2023-03-30 NOTE — PROGRESS NOTES
HENT:  Negative for congestion, ear pain, rhinorrhea and sore throat. Respiratory:  Negative for cough, chest tightness and shortness of breath. Cardiovascular:  Negative for chest pain and palpitations. Gastrointestinal:  Negative for abdominal distention, abdominal pain, constipation, diarrhea and nausea. Endocrine: Negative for polydipsia, polyphagia and polyuria. Genitourinary:  Negative for difficulty urinating and dysuria. Musculoskeletal:  Negative for arthralgias, back pain and myalgias. Middle trigger finger on left hand    Skin:  Negative for color change and rash. Neurological:  Negative for dizziness, weakness, light-headedness and headaches. Hematological:  Negative for adenopathy. Psychiatric/Behavioral:  Negative for agitation and behavioral problems. The patient is not nervous/anxious. Vitals:    03/30/23 1343   BP: 126/78   Pulse: 85   Resp: 16   Temp: 97.5 °F (36.4 °C)   SpO2: 97%       Objective:     Physical Exam  Vitals reviewed. Constitutional:       General: He is not in acute distress. Appearance: Normal appearance. HENT:      Head: Normocephalic and atraumatic. Right Ear: External ear normal.      Left Ear: External ear normal.      Nose: Nose normal.      Mouth/Throat:      Mouth: Mucous membranes are moist.      Pharynx: No oropharyngeal exudate or posterior oropharyngeal erythema. Eyes:      Extraocular Movements: Extraocular movements intact. Conjunctiva/sclera: Conjunctivae normal.      Pupils: Pupils are equal, round, and reactive to light. Cardiovascular:      Rate and Rhythm: Normal rate and regular rhythm. Pulses: Normal pulses. Heart sounds: Normal heart sounds. No murmur heard. Pulmonary:      Effort: Pulmonary effort is normal. No respiratory distress. Breath sounds: Normal breath sounds. No wheezing or rales. Abdominal:      General: Bowel sounds are normal. There is no distension.       Palpations: Abdomen

## 2023-04-04 ENCOUNTER — PATIENT MESSAGE (OUTPATIENT)
Dept: FAMILY MEDICINE CLINIC | Age: 58
End: 2023-04-04

## 2023-04-04 RX ORDER — ONDANSETRON 4 MG/1
4 TABLET, FILM COATED ORAL EVERY 8 HOURS PRN
Qty: 20 TABLET | Refills: 0 | Status: SHIPPED | OUTPATIENT
Start: 2023-04-04

## 2023-04-04 NOTE — TELEPHONE ENCOUNTER
From: Tim Jovel  To: [place]  Sent: 4/4/2023 10:33 AM EDT  Subject: Nausea Medication    jennifer Brown while back you had given me an RX for medication for nausea. Can I please have that RX re-filled? Call me with any questions. [phone] Thank you.

## 2023-04-21 ENCOUNTER — TELEPHONE (OUTPATIENT)
Dept: FAMILY MEDICINE CLINIC | Age: 58
End: 2023-04-21

## 2023-04-21 RX ORDER — ONDANSETRON 4 MG/1
4 TABLET, FILM COATED ORAL EVERY 8 HOURS PRN
Qty: 20 TABLET | Refills: 0 | Status: SHIPPED | OUTPATIENT
Start: 2023-04-21

## 2023-04-21 NOTE — TELEPHONE ENCOUNTER
4/21/23- Patient's wife call, states she went to [facility] in [facility] to  Zofran medication for patient. Pharmacy told patient they did not receive the prescription that was sent over on 4/4/23.

## 2023-04-22 ENCOUNTER — PATIENT MESSAGE (OUTPATIENT)
Dept: FAMILY MEDICINE CLINIC | Age: 58
End: 2023-04-22

## 2023-04-22 DIAGNOSIS — K21.9 GASTRIC REFLUX: ICD-10-CM

## 2023-04-22 DIAGNOSIS — F41.9 ANXIETY AND DEPRESSION: ICD-10-CM

## 2023-04-22 DIAGNOSIS — F32.A ANXIETY AND DEPRESSION: ICD-10-CM

## 2023-04-22 DIAGNOSIS — F10.10 ALCOHOL USE DISORDER, MILD, ABUSE: ICD-10-CM

## 2023-04-24 RX ORDER — OMEPRAZOLE 40 MG/1
CAPSULE, DELAYED RELEASE ORAL
Qty: 90 CAPSULE | Refills: 0 | Status: SHIPPED | OUTPATIENT
Start: 2023-04-24

## 2023-04-24 NOTE — TELEPHONE ENCOUNTER
From: Tim Jovel  To: [place]  Sent: 4/22/2023 9:50 AM EDT  Subject: RX Refill    Please see the attached RX refill Request. Thank you!

## 2023-05-01 ENCOUNTER — OFFICE VISIT (OUTPATIENT)
Dept: ORTHOPEDIC SURGERY | Age: 58
End: 2023-05-01
Payer: COMMERCIAL

## 2023-05-01 VITALS — BODY MASS INDEX: 35.28 KG/M2 | RESPIRATION RATE: 16 BRPM | HEIGHT: 71 IN | WEIGHT: 252 LBS

## 2023-05-01 DIAGNOSIS — M79.641 RIGHT HAND PAIN: ICD-10-CM

## 2023-05-01 DIAGNOSIS — M79.642 LEFT HAND PAIN: ICD-10-CM

## 2023-05-01 DIAGNOSIS — M65.332 TRIGGER FINGER, LEFT MIDDLE FINGER: Primary | ICD-10-CM

## 2023-05-01 PROCEDURE — 99213 OFFICE O/P EST LOW 20 MIN: CPT | Performed by: ORTHOPAEDIC SURGERY

## 2023-05-01 PROCEDURE — 20550 NJX 1 TENDON SHEATH/LIGAMENT: CPT | Performed by: ORTHOPAEDIC SURGERY

## 2023-05-01 RX ORDER — LIDOCAINE HYDROCHLORIDE 10 MG/ML
1 INJECTION, SOLUTION INFILTRATION; PERINEURAL ONCE
Status: COMPLETED | OUTPATIENT
Start: 2023-05-01 | End: 2023-05-01

## 2023-05-01 RX ORDER — TRIAMCINOLONE ACETONIDE 40 MG/ML
40 INJECTION, SUSPENSION INTRA-ARTICULAR; INTRAMUSCULAR ONCE
Status: COMPLETED | OUTPATIENT
Start: 2023-05-01 | End: 2023-05-01

## 2023-05-01 RX ORDER — ATORVASTATIN CALCIUM 80 MG/1
80 TABLET, FILM COATED ORAL NIGHTLY
Qty: 90 TABLET | Refills: 1 | Status: SHIPPED | OUTPATIENT
Start: 2023-05-01

## 2023-05-01 RX ADMIN — TRIAMCINOLONE ACETONIDE 40 MG: 40 INJECTION, SUSPENSION INTRA-ARTICULAR; INTRAMUSCULAR at 11:03

## 2023-05-01 RX ADMIN — LIDOCAINE HYDROCHLORIDE 1 ML: 10 INJECTION, SOLUTION INFILTRATION; PERINEURAL at 11:03

## 2023-05-01 NOTE — PROGRESS NOTES
ORTHOPEDIC PATIENT EVALUATION      HPI / Chief Complaint  Myla Duffy is a 63 y.o. right-hand-dominant male who presents for evaluation of both hands. He has been seen in the past for the right hand. He was diagnosed with a right middle finger trigger finger. This was back in 2019 for which she received a cortisone injection. He states that he had done well up until about 3 months ago when he developed similar but worse symptoms in the left hand middle finger and has had some recurrent symptoms on the right side as well again involving his middle finger. He denies having any numbness or tingling. He describes having locking up especially on the left side with daily activities that involve gripping. Past Medical History  Anselmo Baez  has a past medical history of Arthritis, Diabetes mellitus ([address] GERD (gastroesophageal reflux disease), H/O heart artery stent, Hx of cardiac cath, Hyperlipidemia, Hypertension, Kidney stone, Obesity, and Positive colorectal cancer screening using Cologuard test.    Past Surgical History  Anselmo Baez  has a past surgical history that includes Hand surgery; [place] tooth extraction; Colonoscopy (N/A, 7/30/2019); Shoulder arthroscopy (Right, 02/06/2020); Shoulder arthroscopy (Right, 2/6/2020); Biceps tendon repair (Right, 2/6/2020); Cystoscopy (Right, 8/18/2020); Lithotripsy; hernia repair (11/06/2020); and hernia repair (N/A, 11/6/2020).     Current Medications  Current Outpatient Medications   Medication Sig Dispense Refill    sertraline (ZOLOFT) 50 MG tablet Take 1.5 tablets by mouth daily 135 tablet 1    metoprolol tartrate (LOPRESSOR) 25 MG tablet Take 2 tablets by mouth 2 times daily 60 tablet 3    omeprazole (PRILOSEC) 40 MG delayed release capsule Take 1 capsule by mouth once daily 90 capsule 0    ondansetron (ZOFRAN) 4 MG tablet Take 1 tablet by mouth every 8 hours as needed for Nausea 20 tablet 0    metFORMIN (GLUCOPHAGE) 1000 MG tablet TAKE 1 TABLET BY MOUTH TWICE DAILY

## 2023-05-17 ENCOUNTER — PATIENT MESSAGE (OUTPATIENT)
Dept: FAMILY MEDICINE CLINIC | Age: 58
End: 2023-05-17

## 2023-05-17 DIAGNOSIS — I10 ESSENTIAL HYPERTENSION: ICD-10-CM

## 2023-05-17 RX ORDER — LOSARTAN POTASSIUM 100 MG/1
100 TABLET ORAL DAILY
Qty: 90 TABLET | Refills: 1 | Status: SHIPPED | OUTPATIENT
Start: 2023-05-17

## 2023-05-17 NOTE — TELEPHONE ENCOUNTER
From: Latha Thornton  To: Musa Clark  Sent: 5/17/2023 11:06 AM EDT  Subject: Refill Losartan    Isma Beebe President, I need to refill my Losartan. Attached is the RX form for the online pharmacy, [facility].  Thank you, Irineo Segura

## 2023-05-17 NOTE — TELEPHONE ENCOUNTER
Last Visit Date: 3/30/2023   Next Visit Date: 10/4/2023     Reported 12/13/22 that pt was not taking medication. Should we refill?

## 2023-05-18 RX ORDER — METOPROLOL TARTRATE 50 MG/1
50 TABLET, FILM COATED ORAL 2 TIMES DAILY
Qty: 180 TABLET | Refills: 1 | Status: SHIPPED | OUTPATIENT
Start: 2023-05-18

## 2023-05-18 NOTE — TELEPHONE ENCOUNTER
From: Sara Salomon  To: Collins Dixon  Sent: 5/17/2023 6:39 PM EDT  Subject: another refill    Isma Mayer, I sent a RX form today for a refill on Losartan. I also need a refill for Metoprolol at 50mg, 2x a day (post heart-attack dosage. I have attached a revised RX form.  Thank you, Didi Hicks

## 2023-05-25 ENCOUNTER — PATIENT MESSAGE (OUTPATIENT)
Dept: FAMILY MEDICINE CLINIC | Age: 58
End: 2023-05-25

## 2023-05-30 RX ORDER — METOPROLOL TARTRATE 50 MG/1
50 TABLET, FILM COATED ORAL 2 TIMES DAILY
Qty: 180 TABLET | Refills: 1 | Status: SHIPPED | OUTPATIENT
Start: 2023-05-30

## 2023-06-06 DIAGNOSIS — M51.37 DEGENERATIVE DISC DISEASE AT L5-S1 LEVEL: ICD-10-CM

## 2023-06-06 RX ORDER — IBUPROFEN 800 MG/1
800 TABLET ORAL EVERY 6 HOURS PRN
Qty: 90 TABLET | Refills: 1 | Status: SHIPPED | OUTPATIENT
Start: 2023-06-06 | End: 2024-06-05

## 2023-06-06 NOTE — TELEPHONE ENCOUNTER
Please Approve or Refuse.   Send to Pharmacy per Pt's Request:      Next Visit Date:  10/4/2023   Last Visit Date: 3/30/2023    Hemoglobin A1C (%)   Date Value   03/27/2023 6.4 (H)   12/13/2022 8.1   07/18/2022 7.8             ( goal A1C is < 7)   BP Readings from Last 3 Encounters:   03/30/23 126/78   01/18/23 131/87   12/13/22 120/78          (goal 120/80)  BUN   Date Value Ref Range Status   03/27/2023 10 6 - 20 mg/dL Final     Creatinine   Date Value Ref Range Status   03/27/2023 0.84 0.70 - 1.20 mg/dL Final     Potassium   Date Value Ref Range Status   03/27/2023 4.6 3.7 - 5.3 mmol/L Final

## 2023-07-21 ENCOUNTER — PATIENT MESSAGE (OUTPATIENT)
Dept: FAMILY MEDICINE CLINIC | Age: 58
End: 2023-07-21

## 2023-07-21 DIAGNOSIS — F10.10 ALCOHOL USE DISORDER, MILD, ABUSE: ICD-10-CM

## 2023-07-21 DIAGNOSIS — I10 ESSENTIAL HYPERTENSION: ICD-10-CM

## 2023-07-21 DIAGNOSIS — K21.9 GASTRIC REFLUX: ICD-10-CM

## 2023-07-21 DIAGNOSIS — F41.9 ANXIETY AND DEPRESSION: ICD-10-CM

## 2023-07-21 DIAGNOSIS — F32.A ANXIETY AND DEPRESSION: ICD-10-CM

## 2023-07-21 RX ORDER — OMEPRAZOLE 40 MG/1
CAPSULE, DELAYED RELEASE ORAL
Qty: 90 CAPSULE | Refills: 1 | Status: SHIPPED | OUTPATIENT
Start: 2023-07-21

## 2023-07-21 RX ORDER — CLOPIDOGREL BISULFATE 75 MG/1
75 TABLET ORAL DAILY
Qty: 90 TABLET | Refills: 1 | Status: SHIPPED | OUTPATIENT
Start: 2023-07-21

## 2023-07-21 RX ORDER — ATORVASTATIN CALCIUM 80 MG/1
80 TABLET, FILM COATED ORAL NIGHTLY
Qty: 90 TABLET | Refills: 1 | Status: SHIPPED | OUTPATIENT
Start: 2023-07-21

## 2023-07-21 RX ORDER — LOSARTAN POTASSIUM 100 MG/1
100 TABLET ORAL DAILY
Qty: 90 TABLET | Refills: 1 | Status: SHIPPED | OUTPATIENT
Start: 2023-07-21

## 2023-07-21 RX ORDER — SERTRALINE HYDROCHLORIDE 100 MG/1
100 TABLET, FILM COATED ORAL DAILY
Qty: 90 TABLET | Refills: 1 | Status: SHIPPED | OUTPATIENT
Start: 2023-07-21

## 2023-07-21 NOTE — TELEPHONE ENCOUNTER
From: Vern Ross  To: Ed Gip  Sent: 7/21/2023 3:20 PM EDT  Subject: Refills    Hi Rekha, I need some refills and I noticed some were still at the [facility]. Attached is the refill form please.

## 2023-07-28 ENCOUNTER — TELEPHONE (OUTPATIENT)
Dept: FAMILY MEDICINE CLINIC | Age: 58
End: 2023-07-28

## 2023-07-28 ENCOUNTER — HOSPITAL ENCOUNTER (EMERGENCY)
Age: 58
Discharge: HOME | End: 2023-07-28
Attending: EMERGENCY MEDICINE
Payer: COMMERCIAL

## 2023-07-28 ENCOUNTER — APPOINTMENT (OUTPATIENT)
Dept: GENERAL RADIOLOGY | Age: 58
End: 2023-07-28
Payer: COMMERCIAL

## 2023-07-28 VITALS
BODY MASS INDEX: 34.3 KG/M2 | SYSTOLIC BLOOD PRESSURE: 153 MMHG | HEIGHT: 71 IN | RESPIRATION RATE: 16 BRPM | WEIGHT: 245 LBS | HEART RATE: 83 BPM | TEMPERATURE: 98.1 F | OXYGEN SATURATION: 93 % | DIASTOLIC BLOOD PRESSURE: 71 MMHG

## 2023-07-28 DIAGNOSIS — S62.525D CLOSED NONDISPLACED FRACTURE OF DISTAL PHALANX OF LEFT THUMB WITH ROUTINE HEALING, SUBSEQUENT ENCOUNTER: Primary | ICD-10-CM

## 2023-07-28 DIAGNOSIS — S61.112D LACERATION OF LEFT THUMB WITHOUT FOREIGN BODY WITH DAMAGE TO NAIL, SUBSEQUENT ENCOUNTER: ICD-10-CM

## 2023-07-28 PROCEDURE — 99283 EMERGENCY DEPT VISIT LOW MDM: CPT

## 2023-07-28 PROCEDURE — 73130 X-RAY EXAM OF HAND: CPT

## 2023-07-28 RX ORDER — CEPHALEXIN 500 MG/1
500 CAPSULE ORAL 4 TIMES DAILY
COMMUNITY

## 2023-07-28 RX ORDER — HYDROCODONE BITARTRATE AND ACETAMINOPHEN 5; 325 MG/1; MG/1
1 TABLET ORAL EVERY 6 HOURS PRN
COMMUNITY

## 2023-07-28 ASSESSMENT — ENCOUNTER SYMPTOMS
ABDOMINAL PAIN: 0
VOMITING: 0
NAUSEA: 0
SORE THROAT: 0
BACK PAIN: 0
EYE PAIN: 0
COUGH: 0
SHORTNESS OF BREATH: 0
DIARRHEA: 0
SINUS PAIN: 0

## 2023-07-28 ASSESSMENT — PAIN - FUNCTIONAL ASSESSMENT: PAIN_FUNCTIONAL_ASSESSMENT: NONE - DENIES PAIN

## 2023-07-29 NOTE — DISCHARGE INSTRUCTIONS
Monitor for any worsening signs of infection, continue antibiotics, continue topical antibiotics, keep wound clean and dry, follow-up with hand specialist, return to the ER for any new or worsening symptoms

## 2023-08-01 ENCOUNTER — OFFICE VISIT (OUTPATIENT)
Dept: ORTHOPEDIC SURGERY | Age: 58
End: 2023-08-01
Payer: COMMERCIAL

## 2023-08-01 VITALS — BODY MASS INDEX: 34.3 KG/M2 | WEIGHT: 245 LBS | RESPIRATION RATE: 12 BRPM | HEIGHT: 71 IN

## 2023-08-01 DIAGNOSIS — S68.012D AMPUTATION OF LEFT THUMB, SUBSEQUENT ENCOUNTER: Primary | ICD-10-CM

## 2023-08-01 PROCEDURE — 99203 OFFICE O/P NEW LOW 30 MIN: CPT | Performed by: ORTHOPAEDIC SURGERY

## 2023-08-01 NOTE — PROGRESS NOTES
Patient ID: Jimy Schneider is a 63 y.o. male    Chief Compliant:  Chief Complaint   Patient presents with    Hand Pain     left        Diagnostic imaging:    Diagnostic imaging reveals possibly some small flecks off the tuft of the left thumb    Assessment and Plan:  1. Amputation of left thumb, subsequent encounter        Refer to Dr. Koel Morales for evaluation for possible advancement flap to get a little bit of coverage over the distal phalanx versus observation and a late flap if needed    Follow up prn    HPI:  This is a 63 y.o. male who presents to the clinic today as a new patient for left hand evaluation. Patient's initial injury was last Thursday when his thumb went into a . Review of Systems   All other systems reviewed and are negative. Past History:    Current Outpatient Medications:     cephALEXin (KEFLEX) 500 MG capsule, Take 1 capsule by mouth 4 times daily, Disp: , Rfl:     HYDROcodone-acetaminophen (NORCO) 5-325 MG per tablet, Take 1 tablet by mouth every 6 hours as needed for Pain. Max Daily Amount: 4 tablets, Disp: , Rfl:     mupirocin (BACTROBAN) 2 % ointment, Apply topically 3 times daily. , Disp: 30 g, Rfl: 0    omeprazole (PRILOSEC) 40 MG delayed release capsule, Take 1 capsule by mouth once daily, Disp: 90 capsule, Rfl: 1    losartan (COZAAR) 100 MG tablet, Take 1 tablet by mouth daily, Disp: 90 tablet, Rfl: 1    atorvastatin (LIPITOR) 80 MG tablet, Take 1 tablet by mouth nightly, Disp: 90 tablet, Rfl: 1    clopidogrel (PLAVIX) 75 MG tablet, Take 1 tablet by mouth daily, Disp: 90 tablet, Rfl: 1    sertraline (ZOLOFT) 100 MG tablet, Take 1 tablet by mouth daily, Disp: 90 tablet, Rfl: 1    metoprolol tartrate (LOPRESSOR) 50 MG tablet, Take 1 tablet by mouth 2 times daily, Disp: 180 tablet, Rfl: 1    ondansetron (ZOFRAN) 4 MG tablet, Take 1 tablet by mouth every 8 hours as needed for Nausea, Disp: 20 tablet, Rfl: 0    metFORMIN (GLUCOPHAGE) 1000 MG tablet, TAKE 1 TABLET

## 2023-08-03 ENCOUNTER — OFFICE VISIT (OUTPATIENT)
Dept: FAMILY MEDICINE CLINIC | Age: 58
End: 2023-08-03
Payer: COMMERCIAL

## 2023-08-03 VITALS
RESPIRATION RATE: 16 BRPM | WEIGHT: 248 LBS | BODY MASS INDEX: 34.59 KG/M2 | HEART RATE: 75 BPM | OXYGEN SATURATION: 98 % | DIASTOLIC BLOOD PRESSURE: 76 MMHG | SYSTOLIC BLOOD PRESSURE: 120 MMHG | TEMPERATURE: 96.8 F

## 2023-08-03 DIAGNOSIS — S61.112A LACERATION OF LEFT THUMB WITHOUT FOREIGN BODY WITH DAMAGE TO NAIL, INITIAL ENCOUNTER: ICD-10-CM

## 2023-08-03 DIAGNOSIS — L03.115 CELLULITIS OF RIGHT LOWER EXTREMITY: Primary | ICD-10-CM

## 2023-08-03 PROCEDURE — 3078F DIAST BP <80 MM HG: CPT | Performed by: NURSE PRACTITIONER

## 2023-08-03 PROCEDURE — 99215 OFFICE O/P EST HI 40 MIN: CPT | Performed by: NURSE PRACTITIONER

## 2023-08-03 PROCEDURE — 3074F SYST BP LT 130 MM HG: CPT | Performed by: NURSE PRACTITIONER

## 2023-08-03 PROCEDURE — 99417 PROLNG OP E/M EACH 15 MIN: CPT | Performed by: NURSE PRACTITIONER

## 2023-08-03 RX ORDER — DOXYCYCLINE HYCLATE 100 MG
100 TABLET ORAL 2 TIMES DAILY
Qty: 20 TABLET | Refills: 0 | Status: SHIPPED | OUTPATIENT
Start: 2023-08-03 | End: 2023-08-13

## 2023-08-03 ASSESSMENT — ENCOUNTER SYMPTOMS: COLOR CHANGE: 1

## 2023-08-03 NOTE — PROGRESS NOTES
Feliciapa Becker, APRN-[facility]  [address] [place] [address]  [id number] [address], [address] [address]  Dept: [phone]  Dept Fax: [phone]     Patient ID: Jason Willett is a 63 y.o. 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 He was cutting wood and it popped up and cut this finger and his stomach. 2 ER and then ortho and no treatment really, he was put on an antibiotic that will be done tomorrow     - he is also having right skin pain on his right leg, he states it started about 3 weeks ago it is just sensitive to touch, denies any pain with walking. Pt shows a spider bite to right leg that was has improved some but was very large at first, it happened about 4 weeks ago and it seems the leg pain came after. He states \"my leg used to be more sensitive and redder but since being on the medication for my thumb it has improved some\"     Pt denies any fever or chills. Pt today denies any HA, chest pain, or SOB. Pt denies any N/V/D/C or abdominal pain. Otherwise pt doing well on current tx and no other concerns today. The patient's past medical, surgical, social, and family history as well as his current medications and allergies were reviewed as documented in today's encounter by TRENT Benavidez. Previous office notes, labs, imaging and hospital records were reviewed prior to and during encounter. Current Outpatient Medications on File Prior to Visit   Medication Sig Dispense Refill    cephALEXin (KEFLEX) 500 MG capsule Take 1 capsule by mouth 4 times daily      HYDROcodone-acetaminophen (NORCO) 5-325 MG per tablet Take 1 tablet by mouth every 6 hours as needed for Pain. mupirocin (BACTROBAN) 2 % ointment Apply topically 3 times daily.  30 g 0    omeprazole (PRILOSEC) 40 MG delayed release capsule Take 1 capsule by mouth once daily 90

## 2023-08-14 NOTE — PROGRESS NOTES
Brandy Armendariz, APRN-CNP  [address] [place], [place] [id number] [address], [address] [id number]  Dept: [phone]  Dept Fax: [phone]     Patient ID: Maddie Enriquez is a 63 y.o. male Established patient    HPI    Pt here today for f/u, go over labs and/or diagnostic studies, and medication refills. Pt denies any fever or chills. Pt today denies any HA, chest pain, or SOB. Pt denies any N/V/D/C or abdominal pain. - Pt doing well his leg is no longer bothering him and isn't red anymore  - finger is healing and not as painful, it still hurts if he bumps it    Otherwise pt doing well on current tx and no other concerns today. The patient's past medical, surgical, social, and family history as well as his current medications and allergies were reviewed as documented in today's encounter by TRENT Rangel. Previous office notes, labs, imaging and hospital records were reviewed prior to and during encounter. Current Outpatient Medications on File Prior to Visit   Medication Sig Dispense Refill    doxycycline hyclate (VIBRA-TABS) 100 MG tablet Take 1 tablet by mouth 2 times daily for 10 days 20 tablet 0    cephALEXin (KEFLEX) 500 MG capsule Take 1 capsule by mouth 4 times daily      HYDROcodone-acetaminophen (NORCO) 5-325 MG per tablet Take 1 tablet by mouth every 6 hours as needed for Pain.       omeprazole (PRILOSEC) 40 MG delayed release capsule Take 1 capsule by mouth once daily 90 capsule 1    losartan (COZAAR) 100 MG tablet Take 1 tablet by mouth daily 90 tablet 1    atorvastatin (LIPITOR) 80 MG tablet Take 1 tablet by mouth nightly 90 tablet 1    clopidogrel (PLAVIX) 75 MG tablet Take 1 tablet by mouth daily 90 tablet 1    sertraline (ZOLOFT) 100 MG tablet Take 1 tablet by mouth daily 90 tablet 1    metoprolol tartrate (LOPRESSOR) 50 MG tablet Take 1 tablet by mouth 2 times daily 180 tablet 1    ondansetron (ZOFRAN) 4 MG tablet Take 1 tablet

## 2023-08-15 ENCOUNTER — OFFICE VISIT (OUTPATIENT)
Dept: FAMILY MEDICINE CLINIC | Age: 58
End: 2023-08-15
Payer: COMMERCIAL

## 2023-08-15 VITALS
OXYGEN SATURATION: 96 % | TEMPERATURE: 98.2 F | SYSTOLIC BLOOD PRESSURE: 124 MMHG | RESPIRATION RATE: 16 BRPM | WEIGHT: 252 LBS | DIASTOLIC BLOOD PRESSURE: 76 MMHG | HEART RATE: 72 BPM | BODY MASS INDEX: 35.15 KG/M2

## 2023-08-15 DIAGNOSIS — S61.112A LACERATION OF LEFT THUMB WITHOUT FOREIGN BODY WITH DAMAGE TO NAIL, INITIAL ENCOUNTER: ICD-10-CM

## 2023-08-15 DIAGNOSIS — L03.115 CELLULITIS OF RIGHT LOWER EXTREMITY: Primary | ICD-10-CM

## 2023-08-15 PROCEDURE — 99213 OFFICE O/P EST LOW 20 MIN: CPT | Performed by: NURSE PRACTITIONER

## 2023-08-15 PROCEDURE — 3074F SYST BP LT 130 MM HG: CPT | Performed by: NURSE PRACTITIONER

## 2023-08-15 PROCEDURE — 3078F DIAST BP <80 MM HG: CPT | Performed by: NURSE PRACTITIONER

## 2023-08-15 ASSESSMENT — ENCOUNTER SYMPTOMS
CONSTIPATION: 0
COLOR CHANGE: 0
SORE THROAT: 0
ABDOMINAL PAIN: 0
BACK PAIN: 0
CHEST TIGHTNESS: 0
SHORTNESS OF BREATH: 0
COUGH: 0
ABDOMINAL DISTENTION: 0
DIARRHEA: 0
RHINORRHEA: 0
NAUSEA: 0

## 2023-09-08 DIAGNOSIS — E11.9 TYPE 2 DIABETES MELLITUS WITHOUT COMPLICATION, WITHOUT LONG-TERM CURRENT USE OF INSULIN (HCC): ICD-10-CM

## 2023-09-08 RX ORDER — EMPAGLIFLOZIN 10 MG/1
TABLET, FILM COATED ORAL
Qty: 90 TABLET | Refills: 1 | Status: SHIPPED | OUTPATIENT
Start: 2023-09-08

## 2023-09-16 ENCOUNTER — PATIENT MESSAGE (OUTPATIENT)
Dept: FAMILY MEDICINE CLINIC | Age: 58
End: 2023-09-16

## 2023-09-16 DIAGNOSIS — F41.9 ANXIETY AND DEPRESSION: ICD-10-CM

## 2023-09-16 DIAGNOSIS — F32.A ANXIETY AND DEPRESSION: ICD-10-CM

## 2023-09-16 DIAGNOSIS — I10 ESSENTIAL HYPERTENSION: ICD-10-CM

## 2023-09-16 DIAGNOSIS — F10.10 ALCOHOL USE DISORDER, MILD, ABUSE: ICD-10-CM

## 2023-09-18 RX ORDER — LOSARTAN POTASSIUM 100 MG/1
100 TABLET ORAL DAILY
Qty: 90 TABLET | Refills: 1 | Status: SHIPPED | OUTPATIENT
Start: 2023-09-18

## 2023-09-18 RX ORDER — CLOPIDOGREL BISULFATE 75 MG/1
75 TABLET ORAL DAILY
Qty: 90 TABLET | Refills: 1 | Status: SHIPPED | OUTPATIENT
Start: 2023-09-18

## 2023-09-18 RX ORDER — ATORVASTATIN CALCIUM 80 MG/1
80 TABLET, FILM COATED ORAL NIGHTLY
Qty: 90 TABLET | Refills: 1 | Status: SHIPPED | OUTPATIENT
Start: 2023-09-18

## 2023-09-18 RX ORDER — SERTRALINE HYDROCHLORIDE 100 MG/1
100 TABLET, FILM COATED ORAL DAILY
Qty: 90 TABLET | Refills: 1 | Status: SHIPPED | OUTPATIENT
Start: 2023-09-18

## 2023-09-18 RX ORDER — METOPROLOL TARTRATE 50 MG/1
50 TABLET, FILM COATED ORAL 2 TIMES DAILY
Qty: 180 TABLET | Refills: 1 | Status: SHIPPED | OUTPATIENT
Start: 2023-09-18

## 2023-09-18 NOTE — TELEPHONE ENCOUNTER
From: Brittani Smith  To: Atilano Spurling  Sent: 9/16/2023 11:28 AM EDT  Subject: RX refills    I need refills of the meds below. I attached a form that has to be sent to the online pharmacy.   Losartan 100mg,   Sertraline 100mg,   Atorvastatin 80mg,   [id number],   Clopidogrel 75mg

## 2023-12-05 ENCOUNTER — OFFICE VISIT (OUTPATIENT)
Dept: UROLOGY | Age: 58
End: 2023-12-05
Payer: COMMERCIAL

## 2023-12-05 VITALS — HEIGHT: 71 IN | WEIGHT: 252 LBS | BODY MASS INDEX: 35.28 KG/M2 | TEMPERATURE: 97.3 F

## 2023-12-05 DIAGNOSIS — N20.0 KIDNEY STONES: ICD-10-CM

## 2023-12-05 DIAGNOSIS — R10.9 FLANK PAIN: Primary | ICD-10-CM

## 2023-12-05 PROCEDURE — 99204 OFFICE O/P NEW MOD 45 MIN: CPT | Performed by: UROLOGY

## 2023-12-05 ASSESSMENT — ENCOUNTER SYMPTOMS
BACK PAIN: 0
DIARRHEA: 0
EYE PAIN: 0
ABDOMINAL PAIN: 0
COUGH: 0
CONSTIPATION: 0
VOMITING: 0
WHEEZING: 0
RESPIRATORY NEGATIVE: 1
SHORTNESS OF BREATH: 0
NAUSEA: 0
GASTROINTESTINAL NEGATIVE: 1
EYES NEGATIVE: 1
EYE REDNESS: 0

## 2023-12-05 NOTE — PROGRESS NOTES
[address]  [address] [facility] [address]  Dept: [phone]  Dept Fax: [facility] Urology Office Note - New patient    Patient:  Giacomo Wong  YOB: 1965  Date: 12/5/2023    The patient is a 63 y.o. male who presents todayfor evaluation of the following problems:   Chief Complaint   Patient presents with    Nephrolithiasis    referred by ART Fink CNP.  Here for stones. He was treated 3 years ago, and has not followed up. He is having pain in his flanks again. The pain started about 2 weeks ago. (Patient's old records have been requested, reviewed and summarized in today's note.)    Summary of old records: N/A    Additional History: N/A    Procedures Today: N/A    Last several PSA's:  Lab Results   Component Value Date    PSA 1.52 03/27/2023    PSA 1.30 04/13/2022    PSA 1.33 01/17/2020     Last total testosterone:  No results found for: \"TESTOSTERONE\"  Urinalysis today:  No results found for this visit on 12/05/23. AUA Symptom Score (12/5/2023):                                Last BUN and creatinine:  Lab Results   Component Value Date    BUN 10 03/27/2023     Lab Results   Component Value Date    CREATININE 0.84 03/27/2023       Additional Lab/Culture results: none    Imaging Reviewed during this Office Visit: none  (results were independently reviewed by physician and radiology report verified)    PAST MEDICAL, FAMILY AND SOCIAL HISTORY:  Past Medical History:   Diagnosis Date    Arthritis     Diabetes mellitus ([address])     GERD (gastroesophageal reflux disease)     H/O heart artery stent     Hx of cardiac cath     Hyperlipidemia     Hypertension     Kidney stone     Obesity     Positive colorectal cancer screening using Cologuard test 06/2019     Past Surgical History:   Procedure Laterality Date    BICEPS TENDON REPAIR Right 2/6/2020    BICEPS TENDON

## 2023-12-13 ENCOUNTER — PATIENT MESSAGE (OUTPATIENT)
Dept: FAMILY MEDICINE CLINIC | Age: 58
End: 2023-12-13

## 2023-12-13 DIAGNOSIS — M51.37 DEGENERATIVE DISC DISEASE AT L5-S1 LEVEL: ICD-10-CM

## 2023-12-13 RX ORDER — IBUPROFEN 800 MG/1
800 TABLET ORAL EVERY 6 HOURS PRN
Qty: 90 TABLET | Refills: 0 | Status: SHIPPED | OUTPATIENT
Start: 2023-12-13 | End: 2024-12-12

## 2023-12-13 NOTE — TELEPHONE ENCOUNTER
From: Deysi Gordon  To: Almeta Olszewski  Sent: 12/13/2023 9:38 AM EST  Subject: Ibuprofen 800MG    Jose Da Silva, Can I have a refill on my Ibuprofen 800MG sent to [facility] in [place] please?

## 2024-01-09 ENCOUNTER — TELEPHONE (OUTPATIENT)
Dept: UROLOGY | Age: 59
End: 2024-01-09

## 2024-02-11 ENCOUNTER — APPOINTMENT (OUTPATIENT)
Dept: GENERAL RADIOLOGY | Age: 59
End: 2024-02-11
Payer: COMMERCIAL

## 2024-02-11 ENCOUNTER — APPOINTMENT (OUTPATIENT)
Dept: CT IMAGING | Age: 59
End: 2024-02-11
Payer: COMMERCIAL

## 2024-02-11 ENCOUNTER — HOSPITAL ENCOUNTER (EMERGENCY)
Age: 59
Discharge: HOME | End: 2024-02-11
Attending: EMERGENCY MEDICINE
Payer: COMMERCIAL

## 2024-02-11 VITALS
OXYGEN SATURATION: 93 % | RESPIRATION RATE: 16 BRPM | BODY MASS INDEX: 35 KG/M2 | HEIGHT: 71 IN | SYSTOLIC BLOOD PRESSURE: 120 MMHG | WEIGHT: 250 LBS | DIASTOLIC BLOOD PRESSURE: 88 MMHG | TEMPERATURE: 97.5 F | HEART RATE: 78 BPM

## 2024-02-11 DIAGNOSIS — S82.401A CLOSED FRACTURE OF SHAFT OF RIGHT FIBULA, UNSPECIFIED FRACTURE MORPHOLOGY, INITIAL ENCOUNTER: Primary | ICD-10-CM

## 2024-02-11 LAB
ANION GAP SERPL CALCULATED.3IONS-SCNC: 12 MMOL/L (ref 9–17)
BASOPHILS # BLD: 0.1 K/UL (ref 0–0.2)
BASOPHILS NFR BLD: 1 % (ref 0–2)
BUN SERPL-MCNC: 9 MG/DL (ref 6–20)
CALCIUM SERPL-MCNC: 9.4 MG/DL (ref 8.6–10.4)
CHLORIDE SERPL-SCNC: 98 MMOL/L (ref 98–107)
CO2 SERPL-SCNC: 23 MMOL/L (ref 20–31)
CREAT SERPL-MCNC: 0.8 MG/DL (ref 0.7–1.2)
EOSINOPHIL # BLD: 0.5 K/UL (ref 0–0.4)
EOSINOPHILS RELATIVE PERCENT: 5 % (ref 1–4)
ERYTHROCYTE [DISTWIDTH] IN BLOOD BY AUTOMATED COUNT: 13.1 % (ref 12.5–15.4)
GFR SERPL CREATININE-BSD FRML MDRD: >60 ML/MIN/1.73M2
GLUCOSE SERPL-MCNC: 99 MG/DL (ref 70–99)
HCT VFR BLD AUTO: 45.8 % (ref 41–53)
HGB BLD-MCNC: 15.8 G/DL (ref 13.5–17.5)
INR PPP: 1.1
LYMPHOCYTES NFR BLD: 3.9 K/UL (ref 1–4.8)
LYMPHOCYTES RELATIVE PERCENT: 40 % (ref 24–44)
MCH RBC QN AUTO: 33.3 PG (ref 26–34)
MCHC RBC AUTO-ENTMCNC: 34.4 G/DL (ref 31–37)
MCV RBC AUTO: 96.8 FL (ref 80–100)
MONOCYTES NFR BLD: 1 K/UL (ref 0.1–1.2)
MONOCYTES NFR BLD: 10 % (ref 2–11)
NEUTROPHILS NFR BLD: 44 % (ref 36–66)
NEUTS SEG NFR BLD: 4.3 K/UL (ref 1.8–7.7)
PARTIAL THROMBOPLASTIN TIME: 23.6 SEC (ref 21.3–31.3)
PLATELET # BLD AUTO: 171 K/UL (ref 140–450)
PMV BLD AUTO: 9.2 FL (ref 6–12)
POTASSIUM SERPL-SCNC: 4.1 MMOL/L (ref 3.7–5.3)
PROTHROMBIN TIME: 11.3 SEC (ref 9.4–12.6)
RBC # BLD AUTO: 4.73 M/UL (ref 4.5–5.9)
SODIUM SERPL-SCNC: 133 MMOL/L (ref 135–144)
WBC OTHER # BLD: 9.8 K/UL (ref 3.5–11)

## 2024-02-11 PROCEDURE — 73610 X-RAY EXAM OF ANKLE: CPT

## 2024-02-11 PROCEDURE — 2580000003 HC RX 258: Performed by: EMERGENCY MEDICINE

## 2024-02-11 PROCEDURE — 6360000004 HC RX CONTRAST MEDICATION: Performed by: EMERGENCY MEDICINE

## 2024-02-11 PROCEDURE — 99285 EMERGENCY DEPT VISIT HI MDM: CPT | Performed by: SURGERY

## 2024-02-11 PROCEDURE — 85610 PROTHROMBIN TIME: CPT

## 2024-02-11 PROCEDURE — 85730 THROMBOPLASTIN TIME PARTIAL: CPT

## 2024-02-11 PROCEDURE — 29515 APPLICATION SHORT LEG SPLINT: CPT

## 2024-02-11 PROCEDURE — 72170 X-RAY EXAM OF PELVIS: CPT

## 2024-02-11 PROCEDURE — 71045 X-RAY EXAM CHEST 1 VIEW: CPT

## 2024-02-11 PROCEDURE — 96375 TX/PRO/DX INJ NEW DRUG ADDON: CPT | Performed by: EMERGENCY MEDICINE

## 2024-02-11 PROCEDURE — 6360000002 HC RX W HCPCS: Performed by: EMERGENCY MEDICINE

## 2024-02-11 PROCEDURE — 99285 EMERGENCY DEPT VISIT HI MDM: CPT | Performed by: EMERGENCY MEDICINE

## 2024-02-11 PROCEDURE — 80048 BASIC METABOLIC PNL TOTAL CA: CPT

## 2024-02-11 PROCEDURE — 70450 CT HEAD/BRAIN W/O DYE: CPT

## 2024-02-11 PROCEDURE — 73706 CT ANGIO LWR EXTR W/O&W/DYE: CPT

## 2024-02-11 PROCEDURE — 85025 COMPLETE CBC W/AUTO DIFF WBC: CPT

## 2024-02-11 PROCEDURE — 96374 THER/PROPH/DIAG INJ IV PUSH: CPT | Performed by: EMERGENCY MEDICINE

## 2024-02-11 RX ORDER — HYDROCODONE BITARTRATE AND ACETAMINOPHEN 5; 325 MG/1; MG/1
1 TABLET ORAL EVERY 6 HOURS PRN
Qty: 12 TABLET | Refills: 0 | Status: SHIPPED | OUTPATIENT
Start: 2024-02-11 | End: 2024-02-14

## 2024-02-11 RX ORDER — MORPHINE SULFATE 4 MG/ML
4 INJECTION, SOLUTION INTRAMUSCULAR; INTRAVENOUS ONCE
Status: COMPLETED | OUTPATIENT
Start: 2024-02-11 | End: 2024-02-11

## 2024-02-11 RX ORDER — ONDANSETRON 2 MG/ML
4 INJECTION INTRAMUSCULAR; INTRAVENOUS ONCE
Status: COMPLETED | OUTPATIENT
Start: 2024-02-11 | End: 2024-02-11

## 2024-02-11 RX ORDER — 0.9 % SODIUM CHLORIDE 0.9 %
80 INTRAVENOUS SOLUTION INTRAVENOUS ONCE
Status: COMPLETED | OUTPATIENT
Start: 2024-02-11 | End: 2024-02-11

## 2024-02-11 RX ORDER — SODIUM CHLORIDE 0.9 % (FLUSH) 0.9 %
10 SYRINGE (ML) INJECTION PRN
Status: COMPLETED | OUTPATIENT
Start: 2024-02-11 | End: 2024-02-11

## 2024-02-11 RX ADMIN — SODIUM CHLORIDE, PRESERVATIVE FREE 10 ML: 5 INJECTION INTRAVENOUS at 21:04

## 2024-02-11 RX ADMIN — SODIUM CHLORIDE 80 ML: 9 INJECTION, SOLUTION INTRAVENOUS at 21:04

## 2024-02-11 RX ADMIN — IOPAMIDOL 100 ML: 755 INJECTION, SOLUTION INTRAVENOUS at 21:04

## 2024-02-11 RX ADMIN — ONDANSETRON 4 MG: 2 INJECTION INTRAMUSCULAR; INTRAVENOUS at 21:19

## 2024-02-11 RX ADMIN — MORPHINE SULFATE 4 MG: 4 INJECTION INTRAVENOUS at 21:19

## 2024-02-11 ASSESSMENT — PAIN - FUNCTIONAL ASSESSMENT: PAIN_FUNCTIONAL_ASSESSMENT: NONE - DENIES PAIN

## 2024-02-12 NOTE — ED PROVIDER NOTES
Negative for weakness, numbness and headaches.   Psychiatric/Behavioral:  Negative for agitation.        PAST MEDICAL HISTORY     Past Medical History:   Diagnosis Date    Arthritis     Diabetes mellitus (HCC)     GERD (gastroesophageal reflux disease)     H/O heart artery stent     Hx of cardiac cath     Hyperlipidemia     Hypertension     Kidney stone     Obesity     Positive colorectal cancer screening using Cologuard test 06/2019       SURGICAL HISTORY       Past Surgical History:   Procedure Laterality Date    BICEPS TENDON REPAIR Right 2/6/2020    BICEPS TENDON REPAIR - ARTHROSCOPIC BICEPS TENODESIS performed by Bassam Alcocer MD at [facility] OR    COLONOSCOPY N/A 7/30/2019    COLORECTAL CANCER SCREENING, NOT HIGH RISK performed by Suhas Bundy MD at [facility] OR    CYSTOSCOPY Right 8/18/2020    CYSTO URETEROSCOPY HOLMIUM LASER W/STENT INSERTION performed by Wilver Rodriguez MD at [facility] OR    HAND SURGERY      HERNIA REPAIR  11/06/2020    HERNIA VENTRAL REPAIR LAPAROSCOPIC ROBOTIC    HERNIA REPAIR N/A 11/6/2020    HERNIA VENTRAL REPAIR LAPAROSCOPIC ROBOTIC performed by Suhas Bundy MD at [facility] OR    LITHOTRIPSY      SHOULDER ARTHROSCOPY Right 02/06/2020    ROTATOR CUFF REPAIR WITH ARTHREX, Biceps tendon repair    SHOULDER ARTHROSCOPY Right 2/6/2020    SHOULDER ARTHROSCOPY ROTATOR CUFF REPAIR WITH ARTHREX performed by Bassam Alcocer MD at [facility] OR    WISDOM TOOTH EXTRACTION         CURRENT MEDICATIONS       Previous Medications    ASPIRIN (EQ ASPIRIN ADULT LOW DOSE) 81 MG EC TABLET    Take 1 tablet by mouth daily    ATORVASTATIN (LIPITOR) 80 MG TABLET    Take 1 tablet by mouth nightly    CEPHALEXIN (KEFLEX) 500 MG CAPSULE    Take 1 capsule by mouth 4 times daily    CLOPIDOGREL (PLAVIX) 75 MG TABLET    Take 1 tablet by mouth daily    HYDROCODONE-ACETAMINOPHEN (NORCO) 5-325 MG PER TABLET    Take 1 tablet by mouth every 6 hours as needed for Pain.    IBUPROFEN (ADVIL;MOTRIN) 800  MG TABLET    Take 1 tablet by mouth every 6 hours as needed for Pain    JARDIANCE 10 MG TABLET    Take 1 tablet by mouth once daily    LOSARTAN (COZAAR) 100 MG TABLET    Take 1 tablet by mouth daily    METFORMIN (GLUCOPHAGE) 1000 MG TABLET    TAKE 1 TABLET BY MOUTH TWICE DAILY WITH MEALS    METOPROLOL TARTRATE (LOPRESSOR) 50 MG TABLET    Take 1 tablet by mouth 2 times daily    OMEPRAZOLE (PRILOSEC) 40 MG DELAYED RELEASE CAPSULE    Take 1 capsule by mouth once daily    ONDANSETRON (ZOFRAN) 4 MG TABLET    Take 1 tablet by mouth every 8 hours as needed for Nausea    SERTRALINE (ZOLOFT) 100 MG TABLET    Take 1 tablet by mouth daily       ALLERGIES     has No Known Allergies.    FAMILY HISTORY     He indicated that his mother is alive. He indicated that his father is alive.     family history includes Diabetes in his father and mother; Hypertension in his father and mother.    SOCIAL HISTORY      reports that he quit smoking about 11 years ago. His smoking use included cigarettes. He started smoking about 46 years ago. He has a 70.0 pack-year smoking history. He has never used smokeless tobacco. He reports current alcohol use of about 21.0 standard drinks of alcohol per week. He reports that he does not use drugs.    PHYSICAL EXAM     INITIAL VITALS: BP (!) 158/89   Pulse 74   Temp 97.5 °F (36.4 °C)   Resp 16   Ht 1.803 m (5' 11\")   Wt 113.4 kg (250 lb)   SpO2 94%   BMI 34.87 kg/m²      Physical Exam  Vitals and nursing note reviewed.   Constitutional:       General: He is not in acute distress.     Appearance: He is not toxic-appearing or diaphoretic.   HENT:      Head: Normocephalic.      Right Ear: External ear normal.      Left Ear: External ear normal.      Nose: Nose normal.      Mouth/Throat:      Mouth: Mucous membranes are moist.      Pharynx: Oropharynx is clear.   Eyes:      General:         Right eye: No discharge.         Left eye: No discharge.      Extraocular Movements: Extraocular movements

## 2024-02-12 NOTE — ED NOTES
Crutches provided to pt. Pt demonstrated proper use of crutches. Pt verbalized understanding to not put pressure on Right foot.

## 2024-02-12 NOTE — CONSULTS
2013   Pack Year History     Packs/Day From To Years    0 2013  11.1    2 1978 2013 35.0      Smokeless Tobacco Use  Never              Alcohol History       Alcohol Use Status  Yes Drinks/Week  21 Shots of liquor per week Amount  21.0 standard drinks of alcohol/wk Comment  Vodka & Tonic Q.O.D.              Drug Use       Drug Use Status  Never              Sexual Activity       Sexually Active  Yes                    Family History     Family History   Problem Relation Age of Onset    Hypertension Mother     Diabetes Mother     Hypertension Father     Diabetes Father        Review of Systems   Review of Systems   Constitutional:  Negative for activity change, appetite change, fever and unexpected weight change.   HENT:  Negative for congestion, trouble swallowing and voice change.    Eyes:  Negative for pain and visual disturbance.   Respiratory:  Negative for cough, chest tightness and shortness of breath.    Cardiovascular:  Negative for chest pain and palpitations.   Gastrointestinal:  Negative for abdominal distention, abdominal pain and vomiting.   Endocrine: Negative for cold intolerance and heat intolerance.   Genitourinary:  Negative for dysuria, flank pain and hematuria.   Musculoskeletal:  Negative for joint swelling and neck pain.   Skin:  Negative for color change and rash.   Allergic/Immunologic: Negative for immunocompromised state.   Neurological:  Negative for syncope, speech difficulty, weakness, numbness and headaches.   Hematological:  Negative for adenopathy.   Psychiatric/Behavioral:  Negative for agitation and confusion.         Physical Exam   BP (!) 158/89   Pulse 74   Temp 97.5 °F (36.4 °C)   Resp 16   Ht 1.803 m (5' 11\")   Wt 113.4 kg (250 lb)   SpO2 94%   BMI 34.87 kg/m²      Physical Exam  Constitutional:       General: He is not in acute distress.     Appearance: He is not ill-appearing.   HENT:      Mouth/Throat:      Mouth: Mucous membranes are moist.      Pharynx: Oropharynx  is clear.   Eyes:      General: No scleral icterus.     Extraocular Movements: Extraocular movements intact.      Conjunctiva/sclera: Conjunctivae normal.   Neck:      Thyroid: No thyroid mass or thyromegaly.   Cardiovascular:      Comments: On examination he has a palpable dorsalis pedis pulse on the right which is strong.  His posterior tibial pulse I cannot palpate likely due to edema.  His ankle is significantly edematous as is the top of his foot.  He has motor and sensation intact in the foot.  His foot is minimally cool.  The report from the emergency department physician explains that it is much better than it had been just minutes ago.  His capillary refill is good at approximately 1 and the second.  Pulmonary:      Effort: No respiratory distress.      Breath sounds: No rales.   Abdominal:      General: There is no distension.      Palpations: There is no mass.      Tenderness: There is no abdominal tenderness. There is no guarding or rebound.   Musculoskeletal:      Cervical back: No rigidity or tenderness.   Lymphadenopathy:      Cervical: No cervical adenopathy.   Skin:     Coloration: Skin is not jaundiced.      Findings: No rash.   Neurological:      General: No focal deficit present.      Mental Status: He is alert and oriented to person, place, and time.      Cranial Nerves: No cranial nerve deficit.   Psychiatric:         Mood and Affect: Mood normal.         Labs    CBC:  Recent Labs     02/11/24 2022   WBC 9.8   RBC 4.73   HGB 15.8   HCT 45.8   MCV 96.8   RDW 13.1        CHEMISTRIES:  Recent Labs     02/11/24 2022   *   K 4.1   CL 98   CO2 23   BUN 9   CREATININE 0.8   GLUCOSE 99     PT/INR:  Recent Labs     02/11/24 2022   PROTIME 11.3   INR 1.1     APTT:  Recent Labs     02/11/24 2022   APTT 23.6     LIVER PROFILE:No results for input(s): \"AST\", \"ALT\", \"BILIDIR\", \"BILITOT\", \"ALKPHOS\" in the last 72 hours.    Imaging/Diagnostics   XR ANKLE RIGHT (MIN 3 VIEWS)    Result Date:

## 2024-02-12 NOTE — DISCHARGE INSTRUCTIONS
If you had imaging today, your results are:  CT HEAD WO CONTRAST   Final Result   No acute calvarial fracture or intracranial hemorrhage.         CTA LOWER EXTREMITY RIGHT W CONTRAST   Final Result   Addendum (preliminary) 1 of 1   ADDENDUM:   I relayed the results to the ordering ED physician including the offer to   repeat the exam below the knee.  The patient had subsequently been    evaluated   at the bedside by the vascular surgeon and distal lower extremity pulses    had   satisfactorily returned.  The initial marked decrease in pulses was   attributed to vasospasm and it was determined that no additional imaging    was   necessary at this time.         Final   With regard to vascular evaluation, the scan is nondiagnostic at the lower   2/3 of the lower leg as the scan out ran the contrast injection.  Above this   point, arterial structures show no stenosis, aneurysm or dissection and only   traces of calcific atherosclerotic disease.  Additional imaging as part of   this exam will be obtained at no charge an addendum will be made.      Lateral malleolus fracture with minimal displacement showing mild overlying   soft tissue swelling.      Medial malleolar moderate soft tissue swelling/hematoma.      Incidental note of small benign right inguinal fat containing hernia.         XR CHEST PORTABLE   Final Result   No acute cardiopulmonary abnormality.         XR ANKLE RIGHT (MIN 3 VIEWS)   Final Result   Fracture of the distal fibula extending into the lateral malleolus         XR PELVIS (1-2 VIEWS)   Final Result   No acute displaced pelvic or hip fracture.             Take your medication as indicated and prescribed.  If you are given an antibiotic then, make sure you get the prescription filled and take the antibiotics until finished.      PLEASE RETURN TO THE EMERGENCY DEPARTMENT IMMEDIATELY if your symptoms worsen in anyway or in 8-12 hours if not improved for re-evaluation.  You should immediately return to

## 2024-02-14 ENCOUNTER — OFFICE VISIT (OUTPATIENT)
Age: 59
End: 2024-02-14
Payer: COMMERCIAL

## 2024-02-14 VITALS — BODY MASS INDEX: 35 KG/M2 | HEIGHT: 71 IN | WEIGHT: 250 LBS

## 2024-02-14 DIAGNOSIS — S82.891A CLOSED FRACTURE OF RIGHT ANKLE, INITIAL ENCOUNTER: Primary | ICD-10-CM

## 2024-02-14 PROCEDURE — 99204 OFFICE O/P NEW MOD 45 MIN: CPT | Performed by: ORTHOPAEDIC SURGERY

## 2024-02-14 NOTE — PROGRESS NOTES
[facility] Orth[facility] & [facility]      [facility] PHYSICIANS [facility]  [facility]ORTS MEDICINE  [address]  [place] [address]  Dept: [phone]  Dept Fax: [phone]    Chief Compliant:  Chief Complaint   Patient presents with    Follow-up     ED follow up, R shin fx        History of Present Illness:  This is a 58 y.o. male who presents to the clinic today for evaluation / follow up of right ankle fracture.  He did this last weekend.  He did go to the emergency room where he was placed in a splint and had x-rays taken.  He is here today for further evaluation.  He is not a smoker no history of blood clots..       Physical Exam:    On examination today he is in a well-positioned posterior splint.  His toes are pink and warm with sensation tact light touch    Nursing note and vitals reviewed.     Labs and Imaging: X-rays of the right ankle show that he has a displaced distal fibular fracture with widening of the medial clear space.    XR taken today:  No results found.      No orders of the defined types were placed in this encounter.      Assessment and Plan:  No diagnosis found.      This is a 58 y.o. male with right distal fibular fracture.  I discussed with him this does need to be fixed surgically.  The risks of surgery including infection blood clots numbness around her incision and ankle stiffness were discussed.  At this point he like to go forward with this.  Will see him back for right ankle open reduction internal fixation        Review of Systems   Constitutional: Negative for fever, chills, sweats.   Neurological: Negative numbness, or weakness.   Integumentary: Negative for rash, itching, laceration, or abrasion.   Musculoskeletal: Positive for Follow-up (ED follow up, R shin fx)           Past History:    Current Outpatient Medications:     HYDROcodone-acetaminophen (NORCO) 5-325 MG per tablet, Take 1 tablet by mouth every 6

## 2024-02-15 ENCOUNTER — ANESTHESIA EVENT (OUTPATIENT)
Dept: OPERATING ROOM | Age: 59
End: 2024-02-15
Payer: COMMERCIAL

## 2024-02-19 ENCOUNTER — HOSPITAL ENCOUNTER (OUTPATIENT)
Age: 59
Setting detail: OUTPATIENT SURGERY
Discharge: HOME | End: 2024-02-19
Attending: ORTHOPAEDIC SURGERY | Admitting: ORTHOPAEDIC SURGERY
Payer: COMMERCIAL

## 2024-02-19 ENCOUNTER — APPOINTMENT (OUTPATIENT)
Dept: GENERAL RADIOLOGY | Age: 59
End: 2024-02-19
Attending: ORTHOPAEDIC SURGERY
Payer: COMMERCIAL

## 2024-02-19 ENCOUNTER — ANESTHESIA (OUTPATIENT)
Dept: OPERATING ROOM | Age: 59
End: 2024-02-19
Payer: COMMERCIAL

## 2024-02-19 VITALS
OXYGEN SATURATION: 98 % | RESPIRATION RATE: 21 BRPM | BODY MASS INDEX: 35.17 KG/M2 | SYSTOLIC BLOOD PRESSURE: 109 MMHG | DIASTOLIC BLOOD PRESSURE: 45 MMHG | TEMPERATURE: 97.3 F | WEIGHT: 251.2 LBS | HEIGHT: 71 IN | HEART RATE: 68 BPM

## 2024-02-19 DIAGNOSIS — G89.18 POST-OP PAIN: Primary | ICD-10-CM

## 2024-02-19 LAB — GLUCOSE BLD-MCNC: 121 MG/DL (ref 75–110)

## 2024-02-19 PROCEDURE — 3600000014 HC SURGERY LEVEL 4 ADDTL 15MIN: Performed by: ORTHOPAEDIC SURGERY

## 2024-02-19 PROCEDURE — 82947 ASSAY GLUCOSE BLOOD QUANT: CPT

## 2024-02-19 PROCEDURE — 6370000000 HC RX 637 (ALT 250 FOR IP): Performed by: NURSE ANESTHETIST, CERTIFIED REGISTERED

## 2024-02-19 PROCEDURE — 2500000003 HC RX 250 WO HCPCS: Performed by: ANESTHESIOLOGY

## 2024-02-19 PROCEDURE — 6360000002 HC RX W HCPCS: Performed by: ORTHOPAEDIC SURGERY

## 2024-02-19 PROCEDURE — 3700000000 HC ANESTHESIA ATTENDED CARE: Performed by: ORTHOPAEDIC SURGERY

## 2024-02-19 PROCEDURE — 6360000002 HC RX W HCPCS: Performed by: ANESTHESIOLOGY

## 2024-02-19 PROCEDURE — 2580000003 HC RX 258: Performed by: ANESTHESIOLOGY

## 2024-02-19 PROCEDURE — C1713 ANCHOR/SCREW BN/BN,TIS/BN: HCPCS | Performed by: ORTHOPAEDIC SURGERY

## 2024-02-19 PROCEDURE — 7100000011 HC PHASE II RECOVERY - ADDTL 15 MIN: Performed by: ORTHOPAEDIC SURGERY

## 2024-02-19 PROCEDURE — 7100000000 HC PACU RECOVERY - FIRST 15 MIN: Performed by: ORTHOPAEDIC SURGERY

## 2024-02-19 PROCEDURE — 6360000002 HC RX W HCPCS: Performed by: NURSE ANESTHETIST, CERTIFIED REGISTERED

## 2024-02-19 PROCEDURE — C9290 INJ, BUPIVACAINE LIPOSOME: HCPCS | Performed by: ANESTHESIOLOGY

## 2024-02-19 PROCEDURE — 7100000010 HC PHASE II RECOVERY - FIRST 15 MIN: Performed by: ORTHOPAEDIC SURGERY

## 2024-02-19 PROCEDURE — 2500000003 HC RX 250 WO HCPCS: Performed by: NURSE ANESTHETIST, CERTIFIED REGISTERED

## 2024-02-19 PROCEDURE — 2709999900 HC NON-CHARGEABLE SUPPLY: Performed by: ORTHOPAEDIC SURGERY

## 2024-02-19 PROCEDURE — 2720000010 HC SURG SUPPLY STERILE: Performed by: ORTHOPAEDIC SURGERY

## 2024-02-19 PROCEDURE — 3700000001 HC ADD 15 MINUTES (ANESTHESIA): Performed by: ORTHOPAEDIC SURGERY

## 2024-02-19 PROCEDURE — 2580000003 HC RX 258: Performed by: ORTHOPAEDIC SURGERY

## 2024-02-19 PROCEDURE — 64445 NJX AA&/STRD SCIATIC NRV IMG: CPT | Performed by: ANESTHESIOLOGY

## 2024-02-19 PROCEDURE — 64447 NJX AA&/STRD FEMORAL NRV IMG: CPT | Performed by: ANESTHESIOLOGY

## 2024-02-19 PROCEDURE — 6360000002 HC RX W HCPCS

## 2024-02-19 PROCEDURE — 7100000001 HC PACU RECOVERY - ADDTL 15 MIN: Performed by: ORTHOPAEDIC SURGERY

## 2024-02-19 PROCEDURE — 3600000004 HC SURGERY LEVEL 4 BASE: Performed by: ORTHOPAEDIC SURGERY

## 2024-02-19 PROCEDURE — 6370000000 HC RX 637 (ALT 250 FOR IP)

## 2024-02-19 DEVICE — SCREW BNE L16MM DIA2.7MM CORT S STL ST LOK FULL THRD T8: Type: IMPLANTABLE DEVICE | Site: ANKLE | Status: FUNCTIONAL

## 2024-02-19 DEVICE — IMPLANTABLE DEVICE: Type: IMPLANTABLE DEVICE | Site: ANKLE | Status: FUNCTIONAL

## 2024-02-19 DEVICE — PLATE BNE L99MM 5 H R DST LAT FIBULAR S STL LOK COMPR FOR: Type: IMPLANTABLE DEVICE | Site: ANKLE | Status: FUNCTIONAL

## 2024-02-19 DEVICE — SCREW BNE L12MM DIA2.7MM CORT S STL ST LOK FULL THRD T8: Type: IMPLANTABLE DEVICE | Site: ANKLE | Status: FUNCTIONAL

## 2024-02-19 DEVICE — SCREW BNE L18MM DIA2.7MM CORT S STL ST LOK FULL THRD T8: Type: IMPLANTABLE DEVICE | Site: ANKLE | Status: FUNCTIONAL

## 2024-02-19 RX ORDER — METOCLOPRAMIDE HYDROCHLORIDE 5 MG/ML
10 INJECTION INTRAMUSCULAR; INTRAVENOUS
Status: DISCONTINUED | OUTPATIENT
Start: 2024-02-19 | End: 2024-02-19 | Stop reason: HOSPADM

## 2024-02-19 RX ORDER — CEFAZOLIN 2 G/1
INJECTION, POWDER, FOR SOLUTION INTRAMUSCULAR; INTRAVENOUS
Status: DISCONTINUED
Start: 2024-02-19 | End: 2024-02-19 | Stop reason: HOSPADM

## 2024-02-19 RX ORDER — MORPHINE SULFATE 2 MG/ML
1 INJECTION, SOLUTION INTRAMUSCULAR; INTRAVENOUS EVERY 5 MIN PRN
Status: DISCONTINUED | OUTPATIENT
Start: 2024-02-19 | End: 2024-02-19 | Stop reason: HOSPADM

## 2024-02-19 RX ORDER — DOCUSATE SODIUM 100 MG/1
100 CAPSULE, LIQUID FILLED ORAL 2 TIMES DAILY
Qty: 30 CAPSULE | Refills: 0 | Status: SHIPPED | OUTPATIENT
Start: 2024-02-19 | End: 2024-03-04

## 2024-02-19 RX ORDER — ONDANSETRON 2 MG/ML
INJECTION INTRAMUSCULAR; INTRAVENOUS PRN
Status: DISCONTINUED | OUTPATIENT
Start: 2024-02-19 | End: 2024-02-19 | Stop reason: SDUPTHER

## 2024-02-19 RX ORDER — OXYCODONE HYDROCHLORIDE AND ACETAMINOPHEN 5; 325 MG/1; MG/1
TABLET ORAL
Status: COMPLETED
Start: 2024-02-19 | End: 2024-02-19

## 2024-02-19 RX ORDER — EPHEDRINE SULFATE/0.9% NACL/PF 50 MG/5 ML
SYRINGE (ML) INTRAVENOUS PRN
Status: DISCONTINUED | OUTPATIENT
Start: 2024-02-19 | End: 2024-02-19 | Stop reason: SDUPTHER

## 2024-02-19 RX ORDER — MIDAZOLAM HYDROCHLORIDE 1 MG/ML
INJECTION INTRAMUSCULAR; INTRAVENOUS
Status: COMPLETED
Start: 2024-02-19 | End: 2024-02-19

## 2024-02-19 RX ORDER — LABETALOL HYDROCHLORIDE 5 MG/ML
10 INJECTION, SOLUTION INTRAVENOUS
Status: DISCONTINUED | OUTPATIENT
Start: 2024-02-19 | End: 2024-02-19 | Stop reason: HOSPADM

## 2024-02-19 RX ORDER — BUPIVACAINE HYDROCHLORIDE 2.5 MG/ML
INJECTION, SOLUTION EPIDURAL; INFILTRATION; INTRACAUDAL
Status: COMPLETED
Start: 2024-02-19 | End: 2024-02-19

## 2024-02-19 RX ORDER — HYDRALAZINE HYDROCHLORIDE 20 MG/ML
10 INJECTION INTRAMUSCULAR; INTRAVENOUS
Status: DISCONTINUED | OUTPATIENT
Start: 2024-02-19 | End: 2024-02-19 | Stop reason: HOSPADM

## 2024-02-19 RX ORDER — OXYCODONE HYDROCHLORIDE 5 MG/1
5 TABLET ORAL PRN
Status: DISCONTINUED | OUTPATIENT
Start: 2024-02-19 | End: 2024-02-19 | Stop reason: HOSPADM

## 2024-02-19 RX ORDER — LIDOCAINE HYDROCHLORIDE 10 MG/ML
INJECTION, SOLUTION INFILTRATION; PERINEURAL PRN
Status: DISCONTINUED | OUTPATIENT
Start: 2024-02-19 | End: 2024-02-19 | Stop reason: SDUPTHER

## 2024-02-19 RX ORDER — FENTANYL CITRATE 50 UG/ML
INJECTION, SOLUTION INTRAMUSCULAR; INTRAVENOUS PRN
Status: DISCONTINUED | OUTPATIENT
Start: 2024-02-19 | End: 2024-02-19 | Stop reason: SDUPTHER

## 2024-02-19 RX ORDER — BUPIVACAINE HYDROCHLORIDE 2.5 MG/ML
INJECTION, SOLUTION EPIDURAL; INFILTRATION; INTRACAUDAL
Status: COMPLETED | OUTPATIENT
Start: 2024-02-19 | End: 2024-02-19

## 2024-02-19 RX ORDER — SODIUM CHLORIDE 9 MG/ML
INJECTION, SOLUTION INTRAVENOUS PRN
Status: DISCONTINUED | OUTPATIENT
Start: 2024-02-19 | End: 2024-02-19 | Stop reason: HOSPADM

## 2024-02-19 RX ORDER — MEPERIDINE HYDROCHLORIDE 50 MG/ML
12.5 INJECTION INTRAMUSCULAR; INTRAVENOUS; SUBCUTANEOUS ONCE
Status: DISCONTINUED | OUTPATIENT
Start: 2024-02-19 | End: 2024-02-19 | Stop reason: HOSPADM

## 2024-02-19 RX ORDER — PHENYLEPHRINE HCL IN 0.9% NACL 1 MG/10 ML
SYRINGE (ML) INTRAVENOUS PRN
Status: DISCONTINUED | OUTPATIENT
Start: 2024-02-19 | End: 2024-02-19 | Stop reason: SDUPTHER

## 2024-02-19 RX ORDER — ROPIVACAINE HYDROCHLORIDE 5 MG/ML
INJECTION, SOLUTION EPIDURAL; INFILTRATION; PERINEURAL PRN
Status: DISCONTINUED | OUTPATIENT
Start: 2024-02-19 | End: 2024-02-19 | Stop reason: ALTCHOICE

## 2024-02-19 RX ORDER — OXYCODONE HYDROCHLORIDE AND ACETAMINOPHEN 5; 325 MG/1; MG/1
1-2 TABLET ORAL EVERY 4 HOURS PRN
Qty: 40 TABLET | Refills: 0 | Status: SHIPPED | OUTPATIENT
Start: 2024-02-19 | End: 2024-02-26

## 2024-02-19 RX ORDER — SODIUM CHLORIDE 0.9 % (FLUSH) 0.9 %
5-40 SYRINGE (ML) INJECTION EVERY 12 HOURS SCHEDULED
Status: DISCONTINUED | OUTPATIENT
Start: 2024-02-19 | End: 2024-02-19 | Stop reason: HOSPADM

## 2024-02-19 RX ORDER — FENTANYL CITRATE 50 UG/ML
INJECTION, SOLUTION INTRAMUSCULAR; INTRAVENOUS
Status: COMPLETED
Start: 2024-02-19 | End: 2024-02-19

## 2024-02-19 RX ORDER — SODIUM CHLORIDE 0.9 % (FLUSH) 0.9 %
5-40 SYRINGE (ML) INJECTION PRN
Status: DISCONTINUED | OUTPATIENT
Start: 2024-02-19 | End: 2024-02-19 | Stop reason: HOSPADM

## 2024-02-19 RX ORDER — DEXAMETHASONE SODIUM PHOSPHATE 10 MG/ML
INJECTION, SOLUTION INTRAMUSCULAR; INTRAVENOUS PRN
Status: DISCONTINUED | OUTPATIENT
Start: 2024-02-19 | End: 2024-02-19 | Stop reason: SDUPTHER

## 2024-02-19 RX ORDER — MIDAZOLAM HYDROCHLORIDE 2 MG/2ML
2 INJECTION, SOLUTION INTRAMUSCULAR; INTRAVENOUS
Status: DISCONTINUED | OUTPATIENT
Start: 2024-02-19 | End: 2024-02-19 | Stop reason: HOSPADM

## 2024-02-19 RX ORDER — MIDAZOLAM HYDROCHLORIDE 2 MG/2ML
2 INJECTION, SOLUTION INTRAMUSCULAR; INTRAVENOUS
Status: COMPLETED | OUTPATIENT
Start: 2024-02-19 | End: 2024-02-19

## 2024-02-19 RX ORDER — NEOSTIGMINE METHYLSULFATE 5 MG/5 ML
SYRINGE (ML) INTRAVENOUS PRN
Status: DISCONTINUED | OUTPATIENT
Start: 2024-02-19 | End: 2024-02-19 | Stop reason: SDUPTHER

## 2024-02-19 RX ORDER — ONDANSETRON 2 MG/ML
4 INJECTION INTRAMUSCULAR; INTRAVENOUS
Status: DISCONTINUED | OUTPATIENT
Start: 2024-02-19 | End: 2024-02-19 | Stop reason: HOSPADM

## 2024-02-19 RX ORDER — PROPOFOL 10 MG/ML
INJECTION, EMULSION INTRAVENOUS PRN
Status: DISCONTINUED | OUTPATIENT
Start: 2024-02-19 | End: 2024-02-19 | Stop reason: SDUPTHER

## 2024-02-19 RX ORDER — ROCURONIUM BROMIDE 10 MG/ML
INJECTION, SOLUTION INTRAVENOUS PRN
Status: DISCONTINUED | OUTPATIENT
Start: 2024-02-19 | End: 2024-02-19 | Stop reason: SDUPTHER

## 2024-02-19 RX ORDER — SCOLOPAMINE TRANSDERMAL SYSTEM 1 MG/1
PATCH, EXTENDED RELEASE TRANSDERMAL
Status: COMPLETED
Start: 2024-02-19 | End: 2024-02-19

## 2024-02-19 RX ORDER — GLYCOPYRROLATE 0.2 MG/ML
INJECTION INTRAMUSCULAR; INTRAVENOUS PRN
Status: DISCONTINUED | OUTPATIENT
Start: 2024-02-19 | End: 2024-02-19 | Stop reason: SDUPTHER

## 2024-02-19 RX ORDER — KETOROLAC TROMETHAMINE 30 MG/ML
INJECTION, SOLUTION INTRAMUSCULAR; INTRAVENOUS PRN
Status: DISCONTINUED | OUTPATIENT
Start: 2024-02-19 | End: 2024-02-19 | Stop reason: SDUPTHER

## 2024-02-19 RX ORDER — DIPHENHYDRAMINE HYDROCHLORIDE 50 MG/ML
12.5 INJECTION INTRAMUSCULAR; INTRAVENOUS
Status: DISCONTINUED | OUTPATIENT
Start: 2024-02-19 | End: 2024-02-19 | Stop reason: HOSPADM

## 2024-02-19 RX ORDER — SCOLOPAMINE TRANSDERMAL SYSTEM 1 MG/1
1 PATCH, EXTENDED RELEASE TRANSDERMAL
Status: DISCONTINUED | OUTPATIENT
Start: 2024-02-19 | End: 2024-02-19 | Stop reason: HOSPADM

## 2024-02-19 RX ORDER — OXYCODONE HYDROCHLORIDE AND ACETAMINOPHEN 5; 325 MG/1; MG/1
1 TABLET ORAL ONCE
Status: COMPLETED | OUTPATIENT
Start: 2024-02-19 | End: 2024-02-19

## 2024-02-19 RX ORDER — OXYCODONE HYDROCHLORIDE 5 MG/1
10 TABLET ORAL PRN
Status: DISCONTINUED | OUTPATIENT
Start: 2024-02-19 | End: 2024-02-19 | Stop reason: HOSPADM

## 2024-02-19 RX ORDER — ALBUTEROL SULFATE 90 UG/1
AEROSOL, METERED RESPIRATORY (INHALATION) PRN
Status: DISCONTINUED | OUTPATIENT
Start: 2024-02-19 | End: 2024-02-19 | Stop reason: SDUPTHER

## 2024-02-19 RX ORDER — FAMOTIDINE 10 MG/ML
INJECTION, SOLUTION INTRAVENOUS
Status: DISCONTINUED
Start: 2024-02-19 | End: 2024-02-19 | Stop reason: HOSPADM

## 2024-02-19 RX ORDER — FENTANYL CITRATE 50 UG/ML
100 INJECTION, SOLUTION INTRAMUSCULAR; INTRAVENOUS
Status: COMPLETED | OUTPATIENT
Start: 2024-02-19 | End: 2024-02-19

## 2024-02-19 RX ADMIN — OXYCODONE HYDROCHLORIDE AND ACETAMINOPHEN 1 TABLET: 5; 325 TABLET ORAL at 17:15

## 2024-02-19 RX ADMIN — FENTANYL CITRATE 50 MCG: 50 INJECTION, SOLUTION INTRAMUSCULAR; INTRAVENOUS at 15:10

## 2024-02-19 RX ADMIN — BUPIVACAINE 10 ML: 13.3 INJECTION, SUSPENSION, LIPOSOMAL INFILTRATION at 12:43

## 2024-02-19 RX ADMIN — BUPIVACAINE HYDROCHLORIDE 20 ML: 2.5 INJECTION, SOLUTION EPIDURAL; INFILTRATION; INTRACAUDAL at 12:43

## 2024-02-19 RX ADMIN — MIDAZOLAM HYDROCHLORIDE 2 MG: 2 INJECTION, SOLUTION INTRAMUSCULAR; INTRAVENOUS at 12:33

## 2024-02-19 RX ADMIN — SODIUM CHLORIDE: 9 INJECTION, SOLUTION INTRAVENOUS at 15:55

## 2024-02-19 RX ADMIN — BUPIVACAINE HYDROCHLORIDE 10 ML: 2.5 INJECTION, SOLUTION EPIDURAL; INFILTRATION; INTRACAUDAL; PERINEURAL at 12:34

## 2024-02-19 RX ADMIN — SODIUM CHLORIDE: 9 INJECTION, SOLUTION INTRAVENOUS at 15:07

## 2024-02-19 RX ADMIN — FENTANYL CITRATE 100 MCG: 50 INJECTION INTRAMUSCULAR; INTRAVENOUS at 12:32

## 2024-02-19 RX ADMIN — SODIUM CHLORIDE, PRESERVATIVE FREE 20 MG: 5 INJECTION INTRAVENOUS at 12:04

## 2024-02-19 RX ADMIN — LIDOCAINE HYDROCHLORIDE 40 MG: 10 INJECTION, SOLUTION INFILTRATION; PERINEURAL at 15:10

## 2024-02-19 RX ADMIN — Medication 100 MCG: at 16:10

## 2024-02-19 RX ADMIN — ALBUTEROL SULFATE 2 PUFF: 90 AEROSOL, METERED RESPIRATORY (INHALATION) at 15:18

## 2024-02-19 RX ADMIN — GLYCOPYRROLATE 0.2 MG: 0.2 INJECTION INTRAMUSCULAR; INTRAVENOUS at 15:24

## 2024-02-19 RX ADMIN — Medication 200 MCG: at 15:33

## 2024-02-19 RX ADMIN — ALBUTEROL SULFATE 2 PUFF: 90 AEROSOL, METERED RESPIRATORY (INHALATION) at 16:32

## 2024-02-19 RX ADMIN — LIDOCAINE HYDROCHLORIDE 2 ML: 10 INJECTION, SOLUTION INFILTRATION; PERINEURAL at 16:00

## 2024-02-19 RX ADMIN — PROPOFOL 50 MG: 10 INJECTION, EMULSION INTRAVENOUS at 16:29

## 2024-02-19 RX ADMIN — KETOROLAC TROMETHAMINE 30 MG: 30 INJECTION, SOLUTION INTRAMUSCULAR; INTRAVENOUS at 16:04

## 2024-02-19 RX ADMIN — ROCURONIUM BROMIDE 50 MG: 10 SOLUTION INTRAVENOUS at 15:10

## 2024-02-19 RX ADMIN — FENTANYL CITRATE 100 MCG: 50 INJECTION, SOLUTION INTRAMUSCULAR; INTRAVENOUS at 12:32

## 2024-02-19 RX ADMIN — ALBUTEROL SULFATE 2 PUFF: 90 AEROSOL, METERED RESPIRATORY (INHALATION) at 16:36

## 2024-02-19 RX ADMIN — Medication 5 MG: at 16:28

## 2024-02-19 RX ADMIN — Medication 100 MCG: at 15:49

## 2024-02-19 RX ADMIN — GLYCOPYRROLATE 0.4 MG: 0.2 INJECTION INTRAMUSCULAR; INTRAVENOUS at 16:28

## 2024-02-19 RX ADMIN — CEFAZOLIN 2000 MG: 2 INJECTION, POWDER, FOR SOLUTION INTRAMUSCULAR; INTRAVENOUS at 15:18

## 2024-02-19 RX ADMIN — PROPOFOL 150 MG: 10 INJECTION, EMULSION INTRAVENOUS at 15:10

## 2024-02-19 RX ADMIN — Medication 10 MG: at 15:45

## 2024-02-19 RX ADMIN — PROPOFOL 50 MG: 10 INJECTION, EMULSION INTRAVENOUS at 16:15

## 2024-02-19 RX ADMIN — MIDAZOLAM HYDROCHLORIDE 2 MG: 1 INJECTION, SOLUTION INTRAMUSCULAR; INTRAVENOUS at 12:33

## 2024-02-19 RX ADMIN — ALBUTEROL SULFATE 2 PUFF: 90 AEROSOL, METERED RESPIRATORY (INHALATION) at 15:17

## 2024-02-19 RX ADMIN — Medication 200 MCG: at 15:26

## 2024-02-19 RX ADMIN — DEXAMETHASONE SODIUM PHOSPHATE 10 MG: 10 INJECTION, SOLUTION INTRAMUSCULAR; INTRAVENOUS at 15:15

## 2024-02-19 RX ADMIN — FENTANYL CITRATE 50 MCG: 50 INJECTION, SOLUTION INTRAMUSCULAR; INTRAVENOUS at 15:36

## 2024-02-19 RX ADMIN — ONDANSETRON 4 MG: 2 INJECTION INTRAMUSCULAR; INTRAVENOUS at 15:15

## 2024-02-19 ASSESSMENT — PAIN DESCRIPTION - ORIENTATION
ORIENTATION: RIGHT

## 2024-02-19 ASSESSMENT — PAIN DESCRIPTION - LOCATION
LOCATION: FOOT

## 2024-02-19 ASSESSMENT — PAIN - FUNCTIONAL ASSESSMENT
PAIN_FUNCTIONAL_ASSESSMENT: 0-10
PAIN_FUNCTIONAL_ASSESSMENT: NONE - DENIES PAIN

## 2024-02-19 ASSESSMENT — PAIN SCALES - GENERAL
PAINLEVEL_OUTOF10: 2

## 2024-02-19 ASSESSMENT — PAIN DESCRIPTION - PAIN TYPE
TYPE: SURGICAL PAIN
TYPE: SURGICAL PAIN

## 2024-02-19 ASSESSMENT — PAIN DESCRIPTION - DESCRIPTORS
DESCRIPTORS: SORE
DESCRIPTORS: SORE

## 2024-02-19 NOTE — ANESTHESIA PRE PROCEDURE
Department of Anesthesiology  Preprocedure Note       Name:  Neymar Lyon   Age:  58 y.o.  :  1965                                          MRN:  [id number]         Date:  2024      Surgeon: Surgeon(s):  Austin Frances MD    Procedure: Procedure(s):  RIGHT DISTAL FIBULA OPEN REDUCTION INTERNAL FIXATION WITH SYNTHES    Medications prior to admission:   Prior to Admission medications    Medication Sig Start Date End Date Taking? Authorizing Provider   ibuprofen (ADVIL;MOTRIN) 800 MG tablet Take 1 tablet by mouth every 6 hours as needed for Pain 23  Rekha Hardy APRN - CNP   losartan (COZAAR) 100 MG tablet Take 1 tablet by mouth daily 23   Rekha Hardy APRN - CNP   sertraline (ZOLOFT) 100 MG tablet Take 1 tablet by mouth daily 23   Rekha Hardy APRN - CNP   atorvastatin (LIPITOR) 80 MG tablet Take 1 tablet by mouth nightly 23   Rekha Hardy APRN - CNP   metFORMIN (GLUCOPHAGE) 1000 MG tablet TAKE 1 TABLET BY MOUTH TWICE DAILY WITH MEALS 23   Rekha Hardy APRN - CNP   clopidogrel (PLAVIX) 75 MG tablet Take 1 tablet by mouth daily 23   Rekha Hardy APRN - CNP   metoprolol tartrate (LOPRESSOR) 50 MG tablet Take 1 tablet by mouth 2 times daily 23   Rekha Hardy APRN - CNP   JARDIANCE 10 MG tablet Take 1 tablet by mouth once daily 23   Rekha Hardy APRN - CNP   cephALEXin (KEFLEX) 500 MG capsule Take 1 capsule by mouth 4 times daily  Patient not taking: Reported on 2024    Provider, MD Tamera   omeprazole (PRILOSEC) 40 MG delayed release capsule Take 1 capsule by mouth once daily 23   Rekha Hardy APRN - CNP   ondansetron (ZOFRAN) 4 MG tablet Take 1 tablet by mouth every 8 hours as needed for Nausea  Patient not taking: Reported on 2024   Vannesa Huerta MD   aspirin (EQ ASPIRIN ADULT LOW DOSE) 81 MG EC tablet Take 1 tablet by mouth daily 22   Rekha Hardy APRN - CNP

## 2024-02-19 NOTE — H&P
ORTHOPEDIC PATIENT EVALUATION      HPI / Chief Complaint  Neymar Lyon is a 58 y.o. male who presents for right ankle fracture    Past Medical History  Neymar  has a past medical history of Arthritis, Diabetes mellitus (HCC), GERD (gastroesophageal reflux disease), H/O heart artery stent, Heavy alcohol use, Hx of cardiac cath, Hyperlipidemia, Hypertension, Kidney stone, Obesity, and Positive colorectal cancer screening using Cologuard test.    Past Surgical History  Neymar  has a past surgical history that includes Hand surgery; [place] tooth extraction; Colonoscopy (N/A, 7/30/2019); Shoulder arthroscopy (Right, 02/06/2020); Shoulder arthroscopy (Right, 2/6/2020); Biceps tendon repair (Right, 2/6/2020); Cystoscopy (Right, 8/18/2020); Lithotripsy; hernia repair (11/06/2020); and hernia repair (N/A, 11/6/2020).    Current Medications  No current facility-administered medications for this encounter.     Current Outpatient Medications   Medication Sig Dispense Refill    ibuprofen (ADVIL;MOTRIN) 800 MG tablet Take 1 tablet by mouth every 6 hours as needed for Pain 90 tablet 0    losartan (COZAAR) 100 MG tablet Take 1 tablet by mouth daily 90 tablet 1    sertraline (ZOLOFT) 100 MG tablet Take 1 tablet by mouth daily 90 tablet 1    atorvastatin (LIPITOR) 80 MG tablet Take 1 tablet by mouth nightly 90 tablet 1    metFORMIN (GLUCOPHAGE) 1000 MG tablet TAKE 1 TABLET BY MOUTH TWICE DAILY WITH MEALS 180 tablet 1    clopidogrel (PLAVIX) 75 MG tablet Take 1 tablet by mouth daily 90 tablet 1    metoprolol tartrate (LOPRESSOR) 50 MG tablet Take 1 tablet by mouth 2 times daily 180 tablet 1    JARDIANCE 10 MG tablet Take 1 tablet by mouth once daily 90 tablet 1    cephALEXin (KEFLEX) 500 MG capsule Take 1 capsule by mouth 4 times daily (Patient not taking: Reported on 2/11/2024)      omeprazole (PRILOSEC) 40 MG delayed release capsule Take 1 capsule by mouth once daily 90 capsule 1    ondansetron (ZOFRAN) 4 MG tablet Take 1

## 2024-02-19 NOTE — ANESTHESIA PROCEDURE NOTES
Peripheral Block    Patient location during procedure: pre-op  Reason for block: post-op pain management and at surgeon's request  Start time: 2/19/2024 12:43 PM  End time: 2/19/2024 12:48 PM  Staffing  Performed: anesthesiologist   Anesthesiologist: Archana Alexander MD  Performed by: Archana Alexander MD  Authorized by: Archana Alexander MD    Preanesthetic Checklist  Completed: patient identified, IV checked, site marked, risks and benefits discussed, surgical/procedural consents, equipment checked, pre-op evaluation, timeout performed, anesthesia consent given, oxygen available, monitors applied/VS acknowledged, fire risk safety assessment completed and verbalized and blood product R/B/A discussed and consented  Peripheral Block   Patient position: left lateral decubitus  Prep: ChloraPrep  Provider prep: mask and sterile gloves  Patient monitoring: cardiac monitor, continuous pulse ox, frequent blood pressure checks, IV access, oxygen and responsive to questions  Block type: Sciatic  Popliteal  Laterality: right  Injection technique: single-shot  Guidance: ultrasound guided  Local infiltration: bupivacaine  Infiltration strength: 0.25 %  Local infiltration: bupivacaine  Dose: 2 mL    Needle   Needle type: insulated echogenic nerve stimulator needle   Needle gauge: 20 G  Needle localization: anatomical landmarks and ultrasound guidance  Needle insertion depth: 5 cm  Test dose: negative  Needle length: 10 cm  Assessment   Injection assessment: negative aspiration for heme, no paresthesia on injection, local visualized surrounding nerve on ultrasound and no intravascular symptoms  Paresthesia pain: none  Slow fractionated injection: yes  Hemodynamics: stable  Outcomes: uncomplicated and patient tolerated procedure well    Medications Administered  BUPivacaine (MARCAINE) PF injection 0.25% - Perineural   20 mL - 2/19/2024 12:43:00 PM  BUPivacaine liposome (EXPAREL) injection 1.3% - Perineural   10 mL - 2/19/2024 12:43:00 PM

## 2024-02-19 NOTE — ANESTHESIA PROCEDURE NOTES
Peripheral Block    Patient location during procedure: pre-op  Reason for block: post-op pain management and at surgeon's request  Start time: 2/19/2024 12:34 PM  End time: 2/19/2024 12:40 PM  Staffing  Performed: anesthesiologist   Anesthesiologist: Archana Alexander MD  Performed by: Archana Alexander MD  Authorized by: Archana Alexander MD    Preanesthetic Checklist  Completed: patient identified, IV checked, site marked, risks and benefits discussed, surgical/procedural consents, equipment checked, pre-op evaluation, timeout performed, anesthesia consent given, oxygen available, monitors applied/VS acknowledged, fire risk safety assessment completed and verbalized and blood product R/B/A discussed and consented  Peripheral Block   Patient position: supine  Prep: ChloraPrep  Provider prep: mask and sterile gloves  Patient monitoring: cardiac monitor, continuous pulse ox, frequent blood pressure checks, IV access, oxygen and responsive to questions  Block type: Femoral  Adductor canal  Laterality: right  Injection technique: single-shot  Guidance: ultrasound guided  Local infiltration: bupivacaine  Infiltration strength: 0.25 %  Local infiltration: bupivacaine  Dose: 2 mL    Needle   Needle type: insulated echogenic nerve stimulator needle   Needle gauge: 20 G  Needle localization: ultrasound guidance  Needle insertion depth: 4 cm  Test dose: negative  Needle length: 10 cm  Assessment   Injection assessment: negative aspiration for heme, no paresthesia on injection, local visualized surrounding nerve on ultrasound and no intravascular symptoms  Paresthesia pain: none  Slow fractionated injection: yes  Hemodynamics: stable  Outcomes: uncomplicated and patient tolerated procedure well    Medications Administered  BUPivacaine (MARCAINE) PF injection 0.25% - Perineural   10 mL - 2/19/2024 12:34:00 PM

## 2024-02-19 NOTE — DISCHARGE INSTRUCTIONS
Activity-no weightbearing right lower extremity.    Keep splint clean dry and intact at all times.    Elevate while sitting.    Activity   You have had anesthesia today  Do not drive, operate heavy equipment, consume alcoholic beverages, or make any important decisions  for 24 hours   If you are taking pain medication: Do not drive or consume alcohol.  Take your time changing positions today. You may feel light headed or dizzy if you move too quickly.   Continue your home medications as ordered by your physician.  Avoid aspirin and over the counter medications (including vitamins, and herbal supplements) for 3 days, unless otherwise instructed by your physician.    Rest for the next 24 hours. Getting enough rest will help you recover.   No  heavy lifting or straining right after surgery.   No  strenuous activities as your doctor or other health care professional recommends. Examples of these might include weightlifting, running, or playing sports.   Use incentive spirometry during the day every 30 mins-60 mins for the next 24-36 hours  *** Elevate the extremity      *** Sleep with the head of the bed elevated or sleep in a recliner      Diet   You can eat your normal diet when you feel well. You should start off with bland foods like chicken soup, toast, or yogurt. Then advance as tolerated.  Drink plenty of fluids (unless your doctor tells you not to). Your urine should be very lightly colored without a strong odor.  Medicines   If the doctor gave you a prescription medicine for pain, take it as needed as prescribed.   .  Call your doctor now or seek immediate medical care if:    You have pain that does not get better after you take pain medicine.    You have a fever over 101°F.    You have chills    ***You have blue or discolored fingers or nailbeds   You have signs of infection, such as:   Increased pain, swelling, warmth, or redness.   Red streaks leading from the incision.   Pus draining from the incision.   If

## 2024-02-19 NOTE — ANESTHESIA POSTPROCEDURE EVALUATION
Department of Anesthesiology  Postprocedure Note    Patient: Neymar Lyon  MRN: [id number]  YOB: 1965  Date of evaluation: 2/19/2024    Procedure Summary       Date: 02/19/24 Room / Location: [address]    Anesthesia Start: 1507 Anesthesia Stop: 1648    Procedure: RIGHT DISTAL FIBULA OPEN REDUCTION INTERNAL FIXATION WITH SYNTHES (Right) Diagnosis:       Closed fracture of distal end of right fibula, unspecified fracture morphology, initial encounter      (Closed fracture of distal end of right fibula, unspecified fracture morphology, initial encounter [S82.831A])    Surgeons: Austin Frances MD Responsible Provider: Archana Alexander MD    Anesthesia Type: general ASA Status: 3            Anesthesia Type: No value filed.    Shalini Phase I: Shalini Score: 8    Shalini Phase II:      Anesthesia Post Evaluation    Patient location during evaluation: PACU  Patient participation: complete - patient participated  Level of consciousness: awake and alert  Pain score: 2  Airway patency: patent  Nausea & Vomiting: no nausea and no vomiting  Cardiovascular status: blood pressure returned to baseline  Respiratory status: acceptable and room air  Hydration status: euvolemic  Pain management: adequate and satisfactory to patient    No notable events documented.

## 2024-02-19 NOTE — OP NOTE
has likelihood with this today.  Patient was brought to the operating room placed in the supine position received general anesthesia.  His right lower extremity was then prepped and draped in sterile fashion.  Timeout was performed ensuring correct patient correct extremity and correct procedure.  Preoperative antibiotics were assured with 2 g of Ancef.  We elevated leg for examination play a tourniquet around the upper thigh to 250 m mercury.    I made incision over the lateral aspect of the ankle for a direct lateral approach to the distal fibula.  I incised sharply through skin came down through subcu tissue with my Metzenbaums.  I then took a subperiosteal elevator and was able to get down to bone.  I continue a subperiosteal dissection with my Bovie to free up the fracture site.  I curetted out the hematoma.  I then took a lobster-claw and gained a reduction.  He had a comminuted butterfly fragment in the back.  This did not affect stability with fracture reduction.  I then placed a lag screw across the fracture site.  I then placed my neutralization plate on and placed my proximal and distal screws.  We then took x-rays including a stress view by internally rotating the ankle and externally rotating the foot and we had no syndesmotic widening and no medial clear space widening.  We then irrigated out.  I injected some local anesthetic.  We then closed with 3 oh subcu Vicryl and then staples for skin.  A sterile dressing was then applied.  Tourniquet was released.    He was then transferred to recovery in stable condition.        Electronically signed by Austin Frances MD on 2/19/2024 at 3:59 PM

## 2024-03-06 ENCOUNTER — OFFICE VISIT (OUTPATIENT)
Age: 59
End: 2024-03-06

## 2024-03-06 VITALS — WEIGHT: 251 LBS | BODY MASS INDEX: 35.14 KG/M2 | HEIGHT: 71 IN

## 2024-03-06 DIAGNOSIS — S82.891A CLOSED FRACTURE OF RIGHT ANKLE, INITIAL ENCOUNTER: Primary | ICD-10-CM

## 2024-03-06 PROCEDURE — 99024 POSTOP FOLLOW-UP VISIT: CPT | Performed by: ORTHOPAEDIC SURGERY

## 2024-03-06 NOTE — PROGRESS NOTES
[facility]      [facility] PHYSICIANS [facility], [facility]  [facility] ORTHOPAEDICS AND SPORTS MEDICINE  [address]  [place] [address]     Surgery:    2/19/2024  Right Distal Fibula Open Reduction Internal Fixation With Synthes - Right    History of Present Illness:    This is a 59 y.o. male who presents to the clinic today for post op follow up for Right Distal Fibula Open Reduction Internal Fixation With Synthes - Right on 2/19/2024 .  On examination his incision is well-healed.  He is doing well overall.    X-rays-XR ANKLE RIGHT (MIN 3 VIEWS)    Result Date: 3/6/2024  X-rays of the right ankle show status post open reduction internal fixation with hardware in good position.  Tibiotalar joint looks to be well reduced syndesmosis is well reduced as well.       At this point we will can continue with nonweightbearing for another 3 weeks post.  Will see him back in 3 weeks for repeat x-ray of the right ankle.  At that point we will likely start some formal therapy.  Overall he is doing well.          Electronically signed by Austin Frances MD on 3/6/2024 at 12:28 PM

## 2024-03-26 ENCOUNTER — OFFICE VISIT (OUTPATIENT)
Age: 59
End: 2024-03-26

## 2024-03-26 VITALS — HEIGHT: 71 IN | BODY MASS INDEX: 35.14 KG/M2 | WEIGHT: 251 LBS

## 2024-03-26 DIAGNOSIS — S82.891A CLOSED FRACTURE OF RIGHT ANKLE, INITIAL ENCOUNTER: Primary | ICD-10-CM

## 2024-03-26 PROCEDURE — 99024 POSTOP FOLLOW-UP VISIT: CPT | Performed by: ORTHOPAEDIC SURGERY

## 2024-03-26 NOTE — PROGRESS NOTES
[facility] Orthopedics & Sports Medicine      [facility] PHYSICIANS [facility], [facility]  [facility] ORTHOPAEDICS AND SPORTS MEDICINE  [address]  [place] [address]     Surgery:    2/19/2024  Right Distal Fibula Open Reduction Internal Fixation With Synthes - Right    History of Present Illness:    This is a 59 y.o. male who presents to the clinic today for post op follow up for Right Distal Fibula Open Reduction Internal Fixation With Synthes - Right on 2/19/2024 .  He is doing well.  He states he has been walking on his own.    On examination incision is healed nicely.  No drainage no erythema.  He has good ankle range of motion.    X-rays of the right ankleXR ANKLE RIGHT (MIN 3 VIEWS)    Result Date: 3/26/2024  X-rays of the right ankle show status post open reduction internal fixation tibiotalar joint looks to be well reduced good interval healing       At this point he is doing well he declined any formal therapy.  I explained to him the expected course recovery going forward.  This point we can see him back as needed if he has any further questions.  He felt comfortable with this plan          Electronically signed by Austin Frances MD on 3/26/2024 at 12:23 PM

## 2024-03-27 ENCOUNTER — OFFICE VISIT (OUTPATIENT)
Dept: FAMILY MEDICINE CLINIC | Age: 59
End: 2024-03-27
Payer: COMMERCIAL

## 2024-03-27 ENCOUNTER — PATIENT MESSAGE (OUTPATIENT)
Dept: FAMILY MEDICINE CLINIC | Age: 59
End: 2024-03-27

## 2024-03-27 VITALS
BODY MASS INDEX: 35.43 KG/M2 | OXYGEN SATURATION: 97 % | HEART RATE: 87 BPM | DIASTOLIC BLOOD PRESSURE: 80 MMHG | WEIGHT: 254 LBS | TEMPERATURE: 97 F | SYSTOLIC BLOOD PRESSURE: 136 MMHG | RESPIRATION RATE: 16 BRPM

## 2024-03-27 DIAGNOSIS — I21.11 ST ELEVATION MYOCARDIAL INFARCTION INVOLVING RIGHT CORONARY ARTERY (HCC): Primary | ICD-10-CM

## 2024-03-27 DIAGNOSIS — E78.00 PURE HYPERCHOLESTEROLEMIA: ICD-10-CM

## 2024-03-27 DIAGNOSIS — Z87.891 PERSONAL HISTORY OF TOBACCO USE: ICD-10-CM

## 2024-03-27 DIAGNOSIS — K21.9 GASTROESOPHAGEAL REFLUX DISEASE WITHOUT ESOPHAGITIS: ICD-10-CM

## 2024-03-27 DIAGNOSIS — E61.1 IRON DEFICIENCY: ICD-10-CM

## 2024-03-27 DIAGNOSIS — Z12.5 SCREENING PSA (PROSTATE SPECIFIC ANTIGEN): ICD-10-CM

## 2024-03-27 DIAGNOSIS — E55.9 VITAMIN D DEFICIENCY: ICD-10-CM

## 2024-03-27 DIAGNOSIS — F41.9 ANXIETY AND DEPRESSION: ICD-10-CM

## 2024-03-27 DIAGNOSIS — E11.9 TYPE 2 DIABETES MELLITUS WITHOUT COMPLICATION, WITHOUT LONG-TERM CURRENT USE OF INSULIN (HCC): ICD-10-CM

## 2024-03-27 DIAGNOSIS — E87.1 HYPONATREMIA: ICD-10-CM

## 2024-03-27 DIAGNOSIS — M51.37 DEGENERATIVE DISC DISEASE AT L5-S1 LEVEL: ICD-10-CM

## 2024-03-27 DIAGNOSIS — F32.A ANXIETY AND DEPRESSION: ICD-10-CM

## 2024-03-27 DIAGNOSIS — I10 ESSENTIAL HYPERTENSION: ICD-10-CM

## 2024-03-27 PROCEDURE — G0296 VISIT TO DETERM LDCT ELIG: HCPCS | Performed by: NURSE PRACTITIONER

## 2024-03-27 PROCEDURE — 3079F DIAST BP 80-89 MM HG: CPT | Performed by: NURSE PRACTITIONER

## 2024-03-27 PROCEDURE — 3075F SYST BP GE 130 - 139MM HG: CPT | Performed by: NURSE PRACTITIONER

## 2024-03-27 ASSESSMENT — PATIENT HEALTH QUESTIONNAIRE - PHQ9
SUM OF ALL RESPONSES TO PHQ QUESTIONS 1-9: 0
6. FEELING BAD ABOUT YOURSELF - OR THAT YOU ARE A FAILURE OR HAVE LET YOURSELF OR YOUR FAMILY DOWN: NOT AT ALL
8. MOVING OR SPEAKING SO SLOWLY THAT OTHER PEOPLE COULD HAVE NOTICED. OR THE OPPOSITE, BEING SO FIGETY OR RESTLESS THAT YOU HAVE BEEN MOVING AROUND A LOT MORE THAN USUAL: NOT AT ALL
9. THOUGHTS THAT YOU WOULD BE BETTER OFF DEAD, OR OF HURTING YOURSELF: NOT AT ALL
2. FEELING DOWN, DEPRESSED OR HOPELESS: NOT AT ALL
SUM OF ALL RESPONSES TO PHQ QUESTIONS 1-9: 0
SUM OF ALL RESPONSES TO PHQ9 QUESTIONS 1 & 2: 0
5. POOR APPETITE OR OVEREATING: NOT AT ALL
10. IF YOU CHECKED OFF ANY PROBLEMS, HOW DIFFICULT HAVE THESE PROBLEMS MADE IT FOR YOU TO DO YOUR WORK, TAKE CARE OF THINGS AT HOME, OR GET ALONG WITH OTHER PEOPLE: NOT DIFFICULT AT ALL
3. TROUBLE FALLING OR STAYING ASLEEP: NOT AT ALL
SUM OF ALL RESPONSES TO PHQ QUESTIONS 1-9: 0
1. LITTLE INTEREST OR PLEASURE IN DOING THINGS: NOT AT ALL
SUM OF ALL RESPONSES TO PHQ QUESTIONS 1-9: 0
7. TROUBLE CONCENTRATING ON THINGS, SUCH AS READING THE NEWSPAPER OR WATCHING TELEVISION: NOT AT ALL
4. FEELING TIRED OR HAVING LITTLE ENERGY: NOT AT ALL

## 2024-03-27 ASSESSMENT — ENCOUNTER SYMPTOMS
DIARRHEA: 0
BACK PAIN: 0
SORE THROAT: 0
NAUSEA: 0
CONSTIPATION: 0
SHORTNESS OF BREATH: 0
ABDOMINAL DISTENTION: 0
COUGH: 0
CHEST TIGHTNESS: 0
COLOR CHANGE: 0
RHINORRHEA: 0
ABDOMINAL PAIN: 0

## 2024-03-27 NOTE — PROGRESS NOTES
Screening PSA (prostate specific antigen)  PSA Screening      10. Personal history of tobacco use  MS VISIT TO DISCUSS LUNG CA SCREEN W LDCT    CT Lung Screen (Initial/Annual/Baseline)        Plan:     ST elevation myocardial infarction involving right coronary artery (HCC)  Essential HTN  - Stable: Medication re-filled as needed, con't medications as prescribed, con't current tx plan  - continue Plavix, losartan, lopressor and aspirin as prescribed.   - continue to follow up with cardiology.      Pure hypercholesterolemia  - Stable: Medication re-filled as needed, con't medications as prescribed, con't current tx plan  - Lifestyle changes: Decrease fats, sugars, carbohydrates, and increase routine exercise, try to get 150 minutes of aerobic activity a week and watch calorie portions and to limit her carbs in her diet.  - Foods that helps increase HDL include the following: Fish, nuts, flax, avocados, and legumes (such as soy, kidney beans, chickpeas).  - Will cont with low fat/chol diet and lipitor as ordered.      Vitamin D deficiency  Iron deficiency  - Stable:  con't current tx plan    Type 2 diabetes mellitus without complication, without long-term current use of insulin (HCC)  - Stable: Medication re-filled as needed, con't medications as prescribed, con't current tx plan  - last A1c 6.4, due for labs   - Continue with Metformin and Jardiance.   - Cont with daily foot exams and yearly eye exams.    Anxiety and depression  - Stable: Medication re-filled as needed, con't medications as prescribed, con't current tx plan  - Continue with zoloft as previously prescribed.   - Pt encouraged to deep breath and relax through anxious moments   - Offered reassurance and allowed to ventilate feelings and ask questions.   - Non-pharmological coping methods discussed.     Gastroesophageal reflux disease without esophagitis  - Stable: Medication re-filled as needed, con't medications as prescribed, con't current tx plan  -

## 2024-03-27 NOTE — PATIENT INSTRUCTIONS
- due for CT lung screening call Central scheduling number: [phone] to have completed    - get labs done nothing to eat or drink for 12 hours except for water     Learning About Lung Cancer Screening  What is screening for lung cancer?     Lung cancer screening is a way to find some lung cancers early, before a person has any symptoms of the cancer.  Lung cancer screening may help those who have the highest risk for lung cancer--people age 50 and older who are or were heavy smokers. For most people, who aren't at increased risk, screening for lung cancer probably isn't helpful.  Screening won't prevent cancer. And it may not find all lung cancers. Lung cancer screening may lower the risk of dying from lung cancer in a small number of people.  How is it done?  Lung cancer screening is done with a low-dose CT (computed tomography) scan. A CT scan uses X-rays, or radiation, to make detailed pictures of your body. Experts recommend that screening be done in medical centers that focus on finding and treating lung cancer.  Who is screening recommended for?  Lung cancer screening is recommended for people age 50 and older who are or were heavy smokers. That means people with a smoking history of at least 20 pack years. A pack year is a way to measure how heavy a smoker you are or were.  To figure out your pack years, multiply how many packs a day on average (assuming 20 cigarettes per pack) you have smoked by how many years you have smoked. For example:  If you smoked 1 pack a day for 20 years, that's 1 times 20. So you have a smoking history of 20 pack years.  If you smoked 2 packs a day for 10 years, that's 2 times 10. So you have a smoking history of 20 pack years.  Experts agree that screening is for people who have a high risk of lung cancer. But experts don't agree on what high risk means. Some say people age 50 or older with at least a 20-pack-year smoking history are high risk. Others say it's people age 55 or

## 2024-03-28 ENCOUNTER — HOSPITAL ENCOUNTER (OUTPATIENT)
Age: 59
Discharge: HOME | End: 2024-03-28
Payer: COMMERCIAL

## 2024-03-28 DIAGNOSIS — E55.9 VITAMIN D DEFICIENCY: ICD-10-CM

## 2024-03-28 DIAGNOSIS — Z12.5 SCREENING PSA (PROSTATE SPECIFIC ANTIGEN): ICD-10-CM

## 2024-03-28 DIAGNOSIS — E11.9 TYPE 2 DIABETES MELLITUS WITHOUT COMPLICATION, WITHOUT LONG-TERM CURRENT USE OF INSULIN (HCC): ICD-10-CM

## 2024-03-28 DIAGNOSIS — E78.00 PURE HYPERCHOLESTEROLEMIA: ICD-10-CM

## 2024-03-28 DIAGNOSIS — E61.1 IRON DEFICIENCY: ICD-10-CM

## 2024-03-28 DIAGNOSIS — I10 ESSENTIAL HYPERTENSION: ICD-10-CM

## 2024-03-28 LAB
25(OH)D3 SERPL-MCNC: 27.7 NG/ML (ref 30–100)
ALBUMIN SERPL-MCNC: 4.7 G/DL (ref 3.5–5.2)
ALBUMIN/GLOB SERPL: 1 {RATIO} (ref 1–2.5)
ALP SERPL-CCNC: 119 U/L (ref 40–129)
ALT SERPL-CCNC: 47 U/L (ref 10–50)
ANION GAP SERPL CALCULATED.3IONS-SCNC: 13 MMOL/L (ref 9–16)
AST SERPL-CCNC: 28 U/L (ref 10–50)
BILIRUB SERPL-MCNC: 0.3 MG/DL (ref 0–1.2)
BUN SERPL-MCNC: 14 MG/DL (ref 6–20)
CALCIUM SERPL-MCNC: 9.8 MG/DL (ref 8.6–10.4)
CHLORIDE SERPL-SCNC: 100 MMOL/L (ref 98–107)
CHOLEST SERPL-MCNC: 187 MG/DL (ref 0–199)
CHOLESTEROL/HDL RATIO: 4
CO2 SERPL-SCNC: 22 MMOL/L (ref 20–31)
CREAT SERPL-MCNC: 0.7 MG/DL (ref 0.7–1.2)
CREAT UR-MCNC: 146 MG/DL (ref 39–259)
ERYTHROCYTE [DISTWIDTH] IN BLOOD BY AUTOMATED COUNT: 12.9 % (ref 11.8–14.4)
EST. AVERAGE GLUCOSE BLD GHB EST-MCNC: 117 MG/DL
GFR SERPL CREATININE-BSD FRML MDRD: >90 ML/MIN/1.73M2
GLUCOSE SERPL-MCNC: 170 MG/DL (ref 74–99)
HBA1C MFR BLD: 5.7 % (ref 4–6)
HCT VFR BLD AUTO: 46.4 % (ref 40.7–50.3)
HDLC SERPL-MCNC: 51 MG/DL
HGB BLD-MCNC: 16 G/DL (ref 13–17)
IRON SATN MFR SERPL: 27 % (ref 20–55)
IRON SERPL-MCNC: 108 UG/DL (ref 61–157)
LDLC SERPL CALC-MCNC: 83 MG/DL (ref 0–100)
MCH RBC QN AUTO: 33.2 PG (ref 25.2–33.5)
MCHC RBC AUTO-ENTMCNC: 34.5 G/DL (ref 28.4–34.8)
MCV RBC AUTO: 96.3 FL (ref 82.6–102.9)
MICROALBUMIN UR-MCNC: <12 MG/L (ref 0–20)
MICROALBUMIN/CREAT UR-RTO: NORMAL MCG/MG CREAT (ref 0–17)
NRBC BLD-RTO: 0 PER 100 WBC
PLATELET # BLD AUTO: 279 K/UL (ref 138–453)
PMV BLD AUTO: 10.8 FL (ref 8.1–13.5)
POTASSIUM SERPL-SCNC: 4.3 MMOL/L (ref 3.7–5.3)
PROT SERPL-MCNC: 8 G/DL (ref 6.6–8.7)
PSA SERPL-MCNC: 1.2 NG/ML (ref 0–4)
RBC # BLD AUTO: 4.82 M/UL (ref 4.21–5.77)
SODIUM SERPL-SCNC: 135 MMOL/L (ref 136–145)
TIBC SERPL-MCNC: 397 UG/DL (ref 250–450)
TRIGL SERPL-MCNC: 268 MG/DL
UNSATURATED IRON BINDING CAPACITY: 289 UG/DL (ref 112–347)
VLDLC SERPL CALC-MCNC: 54 MG/DL
WBC OTHER # BLD: 7.9 K/UL (ref 3.5–11.3)

## 2024-03-28 PROCEDURE — 82306 VITAMIN D 25 HYDROXY: CPT

## 2024-03-28 PROCEDURE — 83540 ASSAY OF IRON: CPT

## 2024-03-28 PROCEDURE — 83036 HEMOGLOBIN GLYCOSYLATED A1C: CPT

## 2024-03-28 PROCEDURE — 82043 UR ALBUMIN QUANTITATIVE: CPT

## 2024-03-28 PROCEDURE — G0103 PSA SCREENING: HCPCS

## 2024-03-28 PROCEDURE — 36415 COLL VENOUS BLD VENIPUNCTURE: CPT

## 2024-03-28 PROCEDURE — 82570 ASSAY OF URINE CREATININE: CPT

## 2024-03-28 PROCEDURE — 85027 COMPLETE CBC AUTOMATED: CPT

## 2024-03-28 PROCEDURE — 80061 LIPID PANEL: CPT

## 2024-03-28 PROCEDURE — 80053 COMPREHEN METABOLIC PANEL: CPT

## 2024-03-28 PROCEDURE — 83550 IRON BINDING TEST: CPT

## 2024-03-28 RX ORDER — IBUPROFEN 800 MG/1
800 TABLET ORAL EVERY 6 HOURS PRN
Qty: 90 TABLET | Refills: 0 | Status: SHIPPED | OUTPATIENT
Start: 2024-03-28 | End: 2025-03-28

## 2024-03-28 NOTE — TELEPHONE ENCOUNTER
From: Neymar Lyon  To: Rekha Hardy  Sent: 3/27/2024 5:56 PM EDT  Subject: Ibuprofen    Can you please refill my ibuprofen 800

## 2024-04-22 DIAGNOSIS — E11.9 TYPE 2 DIABETES MELLITUS WITHOUT COMPLICATION, WITHOUT LONG-TERM CURRENT USE OF INSULIN (HCC): ICD-10-CM

## 2024-04-22 RX ORDER — EMPAGLIFLOZIN 10 MG/1
TABLET, FILM COATED ORAL
Qty: 90 TABLET | Refills: 1 | Status: SHIPPED | OUTPATIENT
Start: 2024-04-22

## 2024-04-25 ENCOUNTER — PATIENT MESSAGE (OUTPATIENT)
Dept: FAMILY MEDICINE CLINIC | Age: 59
End: 2024-04-25

## 2024-04-25 DIAGNOSIS — I10 ESSENTIAL HYPERTENSION: ICD-10-CM

## 2024-04-25 DIAGNOSIS — K21.9 GASTRIC REFLUX: ICD-10-CM

## 2024-04-25 RX ORDER — ATORVASTATIN CALCIUM 80 MG/1
80 TABLET, FILM COATED ORAL NIGHTLY
Qty: 90 TABLET | Refills: 1 | Status: SHIPPED | OUTPATIENT
Start: 2024-04-25

## 2024-04-25 RX ORDER — LOSARTAN POTASSIUM 100 MG/1
100 TABLET ORAL DAILY
Qty: 90 TABLET | Refills: 1 | Status: SHIPPED | OUTPATIENT
Start: 2024-04-25

## 2024-04-25 RX ORDER — METOPROLOL TARTRATE 50 MG/1
50 TABLET, FILM COATED ORAL 2 TIMES DAILY
Qty: 180 TABLET | Refills: 1 | Status: SHIPPED | OUTPATIENT
Start: 2024-04-25

## 2024-04-25 RX ORDER — CLOPIDOGREL BISULFATE 75 MG/1
75 TABLET ORAL DAILY
Qty: 90 TABLET | Refills: 1 | Status: SHIPPED | OUTPATIENT
Start: 2024-04-25

## 2024-04-25 RX ORDER — OMEPRAZOLE 40 MG/1
CAPSULE, DELAYED RELEASE ORAL
Qty: 90 CAPSULE | Refills: 1 | Status: SHIPPED | OUTPATIENT
Start: 2024-04-25

## 2024-04-25 NOTE — TELEPHONE ENCOUNTER
From: Neymar Lyon  To: Rekha Hardy  Sent: 4/25/2024 10:10 AM EDT  Subject: Re-fills needed    Jose Da Silva, just a reminder I need several re-fills. Most RXs are with [facility] (see attached). I need a re-fill for Jardiance also. [facility] in [place].

## 2024-05-13 DIAGNOSIS — N20.0 KIDNEY STONE: Primary | ICD-10-CM

## 2024-07-29 ENCOUNTER — PATIENT MESSAGE (OUTPATIENT)
Dept: FAMILY MEDICINE CLINIC | Age: 59
End: 2024-07-29

## 2024-07-29 DIAGNOSIS — M51.37 DEGENERATIVE DISC DISEASE AT L5-S1 LEVEL: ICD-10-CM

## 2024-07-30 RX ORDER — IBUPROFEN 800 MG/1
800 TABLET ORAL EVERY 6 HOURS PRN
Qty: 90 TABLET | Refills: 1 | Status: SHIPPED | OUTPATIENT
Start: 2024-07-30 | End: 2025-07-30

## 2024-07-30 NOTE — TELEPHONE ENCOUNTER
From: Neymar Lyon  To: Rekha Hardy  Sent: 7/29/2024 5:19 PM EDT  Subject: Ibuprofen    Hi Rekha, I lost my bottle of 800s. Can I please have a refill called in [facility]?

## 2024-09-27 ENCOUNTER — OFFICE VISIT (OUTPATIENT)
Dept: FAMILY MEDICINE CLINIC | Age: 59
End: 2024-09-27
Payer: COMMERCIAL

## 2024-09-27 VITALS
HEART RATE: 72 BPM | SYSTOLIC BLOOD PRESSURE: 138 MMHG | TEMPERATURE: 97.6 F | BODY MASS INDEX: 35.29 KG/M2 | OXYGEN SATURATION: 95 % | WEIGHT: 253 LBS | DIASTOLIC BLOOD PRESSURE: 88 MMHG | RESPIRATION RATE: 16 BRPM

## 2024-09-27 DIAGNOSIS — F32.A ANXIETY AND DEPRESSION: ICD-10-CM

## 2024-09-27 DIAGNOSIS — K21.9 GASTROESOPHAGEAL REFLUX DISEASE WITHOUT ESOPHAGITIS: ICD-10-CM

## 2024-09-27 DIAGNOSIS — E61.1 IRON DEFICIENCY: ICD-10-CM

## 2024-09-27 DIAGNOSIS — E55.9 VITAMIN D DEFICIENCY: ICD-10-CM

## 2024-09-27 DIAGNOSIS — Z12.5 SCREENING PSA (PROSTATE SPECIFIC ANTIGEN): ICD-10-CM

## 2024-09-27 DIAGNOSIS — I10 ESSENTIAL HYPERTENSION: ICD-10-CM

## 2024-09-27 DIAGNOSIS — I25.2 HX OF ST ELEVATION MYOCARDIAL INFARCTION: ICD-10-CM

## 2024-09-27 DIAGNOSIS — F41.9 ANXIETY AND DEPRESSION: ICD-10-CM

## 2024-09-27 DIAGNOSIS — L08.9 SKIN INFECTION: Primary | ICD-10-CM

## 2024-09-27 DIAGNOSIS — E78.00 PURE HYPERCHOLESTEROLEMIA: ICD-10-CM

## 2024-09-27 DIAGNOSIS — E11.9 TYPE 2 DIABETES MELLITUS WITHOUT COMPLICATION, WITHOUT LONG-TERM CURRENT USE OF INSULIN (HCC): ICD-10-CM

## 2024-09-27 PROCEDURE — 3044F HG A1C LEVEL LT 7.0%: CPT | Performed by: NURSE PRACTITIONER

## 2024-09-27 PROCEDURE — 99215 OFFICE O/P EST HI 40 MIN: CPT | Performed by: NURSE PRACTITIONER

## 2024-09-27 PROCEDURE — 3079F DIAST BP 80-89 MM HG: CPT | Performed by: NURSE PRACTITIONER

## 2024-09-27 PROCEDURE — 3075F SYST BP GE 130 - 139MM HG: CPT | Performed by: NURSE PRACTITIONER

## 2024-09-27 RX ORDER — KETOROLAC TROMETHAMINE 30 MG/ML
1 INJECTION, SOLUTION INTRAMUSCULAR; INTRAVENOUS DAILY
Qty: 1 EACH | Refills: 1 | Status: SHIPPED | OUTPATIENT
Start: 2024-09-27

## 2024-09-27 RX ORDER — FLASH GLUCOSE SENSOR
1 KIT MISCELLANEOUS
Qty: 6 EACH | Refills: 1 | Status: SHIPPED | OUTPATIENT
Start: 2024-09-27

## 2024-09-27 RX ORDER — CEPHALEXIN 500 MG/1
500 CAPSULE ORAL 3 TIMES DAILY
Qty: 21 CAPSULE | Refills: 0 | Status: SHIPPED | OUTPATIENT
Start: 2024-09-27 | End: 2024-10-04

## 2024-09-27 SDOH — ECONOMIC STABILITY: FOOD INSECURITY: WITHIN THE PAST 12 MONTHS, YOU WORRIED THAT YOUR FOOD WOULD RUN OUT BEFORE YOU GOT MONEY TO BUY MORE.: NEVER TRUE

## 2024-09-27 SDOH — ECONOMIC STABILITY: FOOD INSECURITY: WITHIN THE PAST 12 MONTHS, THE FOOD YOU BOUGHT JUST DIDN'T LAST AND YOU DIDN'T HAVE MONEY TO GET MORE.: NEVER TRUE

## 2024-09-27 SDOH — ECONOMIC STABILITY: INCOME INSECURITY: HOW HARD IS IT FOR YOU TO PAY FOR THE VERY BASICS LIKE FOOD, HOUSING, MEDICAL CARE, AND HEATING?: NOT HARD AT ALL

## 2024-09-27 ASSESSMENT — ENCOUNTER SYMPTOMS
ABDOMINAL DISTENTION: 0
COUGH: 0
CONSTIPATION: 0
RHINORRHEA: 0
SORE THROAT: 0
BACK PAIN: 0
NAUSEA: 0
DIARRHEA: 0
COLOR CHANGE: 0
SHORTNESS OF BREATH: 0
CHEST TIGHTNESS: 0
ABDOMINAL PAIN: 0

## 2024-10-07 DIAGNOSIS — K21.9 GASTRIC REFLUX: ICD-10-CM

## 2024-10-07 DIAGNOSIS — E11.9 TYPE 2 DIABETES MELLITUS WITHOUT COMPLICATION, WITHOUT LONG-TERM CURRENT USE OF INSULIN (HCC): ICD-10-CM

## 2024-10-07 DIAGNOSIS — I10 ESSENTIAL HYPERTENSION: ICD-10-CM

## 2024-10-07 DIAGNOSIS — F41.9 ANXIETY AND DEPRESSION: ICD-10-CM

## 2024-10-07 DIAGNOSIS — F32.A ANXIETY AND DEPRESSION: ICD-10-CM

## 2024-10-07 DIAGNOSIS — F10.10 ALCOHOL USE DISORDER, MILD, ABUSE: ICD-10-CM

## 2024-10-07 RX ORDER — CLOPIDOGREL BISULFATE 75 MG/1
75 TABLET ORAL DAILY
Qty: 90 TABLET | Refills: 1 | Status: SHIPPED | OUTPATIENT
Start: 2024-10-07

## 2024-10-07 RX ORDER — OMEPRAZOLE 40 MG/1
CAPSULE, DELAYED RELEASE ORAL
Qty: 90 CAPSULE | Refills: 1 | Status: SHIPPED | OUTPATIENT
Start: 2024-10-07

## 2024-10-07 RX ORDER — SERTRALINE HYDROCHLORIDE 100 MG/1
100 TABLET, FILM COATED ORAL DAILY
Qty: 90 TABLET | Refills: 1 | Status: SHIPPED | OUTPATIENT
Start: 2024-10-07

## 2024-10-07 RX ORDER — ATORVASTATIN CALCIUM 80 MG/1
80 TABLET, FILM COATED ORAL NIGHTLY
Qty: 90 TABLET | Refills: 1 | Status: SHIPPED | OUTPATIENT
Start: 2024-10-07

## 2024-10-07 RX ORDER — LOSARTAN POTASSIUM 100 MG/1
100 TABLET ORAL DAILY
Qty: 90 TABLET | Refills: 1 | Status: SHIPPED | OUTPATIENT
Start: 2024-10-07

## 2024-10-07 RX ORDER — METOPROLOL TARTRATE 50 MG
50 TABLET ORAL 2 TIMES DAILY
Qty: 180 TABLET | Refills: 1 | Status: SHIPPED | OUTPATIENT
Start: 2024-10-07

## 2024-10-17 ENCOUNTER — HOSPITAL ENCOUNTER (OUTPATIENT)
Age: 59
Discharge: HOME | End: 2024-10-19
Payer: COMMERCIAL

## 2024-10-17 ENCOUNTER — HOSPITAL ENCOUNTER (OUTPATIENT)
Dept: GENERAL RADIOLOGY | Age: 59
Discharge: HOME | End: 2024-10-19
Payer: COMMERCIAL

## 2024-10-17 ENCOUNTER — PATIENT MESSAGE (OUTPATIENT)
Dept: FAMILY MEDICINE CLINIC | Age: 59
End: 2024-10-17

## 2024-10-17 DIAGNOSIS — E11.9 TYPE 2 DIABETES MELLITUS WITHOUT COMPLICATION, WITHOUT LONG-TERM CURRENT USE OF INSULIN (HCC): ICD-10-CM

## 2024-10-17 DIAGNOSIS — N20.0 KIDNEY STONE: ICD-10-CM

## 2024-10-17 PROCEDURE — 74018 RADEX ABDOMEN 1 VIEW: CPT

## 2024-10-25 NOTE — TELEPHONE ENCOUNTER
I spoke with his wife directly. She relates that he is on his way back from [place] as we speak. We are not sure what time he will get back into town but I did advise her to have him be evaluated in the ED locally, as she thinks that there is bone involvement.
Pt cut the top of his left thumb off yesterday around 7pm in [place]. He went to the hospital but Sara does not think that the wound and surrounding area was cleaned properly. No stitches. The hospital recommended that he follow up with ortho. Cindy Hargrove is asking for a referral. eSjal advise.
Gen:  Well appearing in NAD  Head:  NC/AT  HEENT: pupils perrl,no pharyngeal erythema, uvula midline  Cardiac: S1S2, RRR  Abd: Soft,ttp epigastric area  Resp: No distress, CTA   musculoskeletal:: no deformities, no swelling, strength +5/+5  Skin: warm and dry as visualized, no rashes  Neuro: DANG, aao x 4  Psych:alert, cooperative, appropriate mood and affect for situation

## 2025-01-09 ENCOUNTER — PATIENT MESSAGE (OUTPATIENT)
Age: 60
End: 2025-01-09

## 2025-01-09 NOTE — TELEPHONE ENCOUNTER
Patient is scheduled on 2/6/25 with [facility]. Patient is in [place] until the end of the month. I did let patient know if he gets worse to go to the er. Patient understood.

## 2025-02-17 ENCOUNTER — PATIENT MESSAGE (OUTPATIENT)
Dept: FAMILY MEDICINE CLINIC | Age: 60
End: 2025-02-17

## 2025-02-17 DIAGNOSIS — M51.379 DEGENERATIVE DISC DISEASE AT L5-S1 LEVEL: ICD-10-CM

## 2025-02-17 RX ORDER — IBUPROFEN 800 MG/1
800 TABLET, FILM COATED ORAL EVERY 6 HOURS PRN
Qty: 90 TABLET | Refills: 1 | Status: SHIPPED | OUTPATIENT
Start: 2025-02-17 | End: 2026-02-17

## 2025-03-05 DIAGNOSIS — F41.9 ANXIETY AND DEPRESSION: ICD-10-CM

## 2025-03-05 DIAGNOSIS — I10 ESSENTIAL HYPERTENSION: ICD-10-CM

## 2025-03-05 DIAGNOSIS — F32.A ANXIETY AND DEPRESSION: ICD-10-CM

## 2025-03-05 DIAGNOSIS — F10.10 ALCOHOL USE DISORDER, MILD, ABUSE: ICD-10-CM

## 2025-03-05 DIAGNOSIS — K21.9 GASTRIC REFLUX: ICD-10-CM

## 2025-03-06 RX ORDER — CLOPIDOGREL BISULFATE 75 MG/1
75 TABLET ORAL DAILY
Qty: 90 TABLET | Refills: 1 | Status: SHIPPED | OUTPATIENT
Start: 2025-03-06

## 2025-03-06 RX ORDER — METOPROLOL TARTRATE 50 MG
50 TABLET ORAL 2 TIMES DAILY
Qty: 180 TABLET | Refills: 1 | Status: SHIPPED | OUTPATIENT
Start: 2025-03-06

## 2025-03-06 RX ORDER — SERTRALINE HYDROCHLORIDE 100 MG/1
100 TABLET, FILM COATED ORAL DAILY
Qty: 90 TABLET | Refills: 1 | Status: SHIPPED | OUTPATIENT
Start: 2025-03-06

## 2025-03-06 RX ORDER — ATORVASTATIN CALCIUM 80 MG/1
80 TABLET, FILM COATED ORAL NIGHTLY
Qty: 90 TABLET | Refills: 1 | Status: SHIPPED | OUTPATIENT
Start: 2025-03-06

## 2025-03-06 RX ORDER — LOSARTAN POTASSIUM 100 MG/1
100 TABLET ORAL DAILY
Qty: 90 TABLET | Refills: 1 | Status: SHIPPED | OUTPATIENT
Start: 2025-03-06

## 2025-03-06 RX ORDER — OMEPRAZOLE 40 MG/1
CAPSULE, DELAYED RELEASE ORAL
Qty: 90 CAPSULE | Refills: 1 | Status: SHIPPED | OUTPATIENT
Start: 2025-03-06

## 2025-04-02 ENCOUNTER — APPOINTMENT (OUTPATIENT)
Dept: CT IMAGING | Age: 60
End: 2025-04-02
Payer: COMMERCIAL

## 2025-04-02 ENCOUNTER — OFFICE VISIT (OUTPATIENT)
Dept: FAMILY MEDICINE CLINIC | Age: 60
End: 2025-04-02
Payer: COMMERCIAL

## 2025-04-02 ENCOUNTER — HOSPITAL ENCOUNTER (OUTPATIENT)
Age: 60
Setting detail: OBSERVATION
Discharge: HOME | End: 2025-04-03
Attending: EMERGENCY MEDICINE | Admitting: INTERNAL MEDICINE
Payer: COMMERCIAL

## 2025-04-02 ENCOUNTER — RESULTS FOLLOW-UP (OUTPATIENT)
Dept: FAMILY MEDICINE CLINIC | Age: 60
End: 2025-04-02

## 2025-04-02 VITALS
WEIGHT: 262 LBS | DIASTOLIC BLOOD PRESSURE: 80 MMHG | BODY MASS INDEX: 36.68 KG/M2 | SYSTOLIC BLOOD PRESSURE: 130 MMHG | HEIGHT: 71 IN | HEART RATE: 70 BPM | OXYGEN SATURATION: 96 % | TEMPERATURE: 97.6 F | RESPIRATION RATE: 16 BRPM

## 2025-04-02 DIAGNOSIS — E55.9 VITAMIN D DEFICIENCY: ICD-10-CM

## 2025-04-02 DIAGNOSIS — E11.9 TYPE 2 DIABETES MELLITUS WITHOUT COMPLICATION, WITHOUT LONG-TERM CURRENT USE OF INSULIN: ICD-10-CM

## 2025-04-02 DIAGNOSIS — R07.9 CHEST PAIN, UNSPECIFIED TYPE: ICD-10-CM

## 2025-04-02 DIAGNOSIS — I25.2 HX OF MYOCARDIAL INFARCTION: ICD-10-CM

## 2025-04-02 DIAGNOSIS — E78.00 PURE HYPERCHOLESTEROLEMIA: ICD-10-CM

## 2025-04-02 DIAGNOSIS — I10 ESSENTIAL HYPERTENSION: ICD-10-CM

## 2025-04-02 DIAGNOSIS — E61.1 IRON DEFICIENCY: ICD-10-CM

## 2025-04-02 DIAGNOSIS — K21.9 GASTROESOPHAGEAL REFLUX DISEASE WITHOUT ESOPHAGITIS: ICD-10-CM

## 2025-04-02 DIAGNOSIS — Z00.00 ANNUAL PHYSICAL EXAM: Primary | ICD-10-CM

## 2025-04-02 DIAGNOSIS — F32.A ANXIETY AND DEPRESSION: ICD-10-CM

## 2025-04-02 DIAGNOSIS — F41.9 ANXIETY AND DEPRESSION: ICD-10-CM

## 2025-04-02 DIAGNOSIS — I20.0 UNSTABLE ANGINA (HCC): Primary | ICD-10-CM

## 2025-04-02 PROBLEM — Z95.5 PRESENCE OF DRUG COATED STENT IN LAD CORONARY ARTERY: Chronic | Status: ACTIVE | Noted: 2025-04-02

## 2025-04-02 PROBLEM — M19.011 DEGENERATIVE JOINT DISEASE OF RIGHT ACROMIOCLAVICULAR JOINT: Status: RESOLVED | Noted: 2019-10-31 | Resolved: 2025-04-02

## 2025-04-02 PROBLEM — R39.89 ABNORMAL PROSTATE EXAM: Status: RESOLVED | Noted: 2019-12-11 | Resolved: 2025-04-02

## 2025-04-02 PROBLEM — Z95.5 PRESENCE OF DRUG COATED STENT IN RIGHT CORONARY ARTERY: Status: ACTIVE | Noted: 2025-04-02

## 2025-04-02 PROBLEM — I20.89 EXERTIONAL ANGINA: Status: ACTIVE | Noted: 2025-04-02

## 2025-04-02 PROBLEM — M65.331 TRIGGER FINGER, RIGHT MIDDLE FINGER: Status: RESOLVED | Noted: 2019-09-12 | Resolved: 2025-04-02

## 2025-04-02 PROBLEM — R19.5 POSITIVE COLORECTAL CANCER SCREENING USING COLOGUARD TEST: Status: RESOLVED | Noted: 2019-06-19 | Resolved: 2025-04-02

## 2025-04-02 PROBLEM — F10.10 ALCOHOL USE DISORDER, MILD, ABUSE: Status: RESOLVED | Noted: 2020-01-29 | Resolved: 2025-04-02

## 2025-04-02 PROBLEM — M75.81: Status: RESOLVED | Noted: 2019-11-07 | Resolved: 2025-04-02

## 2025-04-02 PROBLEM — K40.90 RIGHT INGUINAL HERNIA: Status: RESOLVED | Noted: 2019-07-10 | Resolved: 2025-04-02

## 2025-04-02 LAB
ANION GAP SERPL CALCULATED.3IONS-SCNC: 12 MMOL/L (ref 9–17)
BASOPHILS # BLD: 0.1 K/UL (ref 0–0.2)
BASOPHILS NFR BLD: 1 % (ref 0–2)
BNP SERPL-MCNC: <36 PG/ML
BUN SERPL-MCNC: 13 MG/DL (ref 6–20)
CALCIUM SERPL-MCNC: 9.5 MG/DL (ref 8.6–10.4)
CHLORIDE SERPL-SCNC: 102 MMOL/L (ref 98–107)
CO2 SERPL-SCNC: 24 MMOL/L (ref 20–31)
CREAT SERPL-MCNC: 1 MG/DL (ref 0.7–1.2)
EOSINOPHIL # BLD: 0.5 K/UL (ref 0–0.4)
EOSINOPHILS RELATIVE PERCENT: 5 % (ref 1–4)
ERYTHROCYTE [DISTWIDTH] IN BLOOD BY AUTOMATED COUNT: 14.9 % (ref 12.5–15.4)
GFR, ESTIMATED: 87 ML/MIN/1.73M2
GLUCOSE BLD-MCNC: 136 MG/DL (ref 75–110)
GLUCOSE BLD-MCNC: 221 MG/DL (ref 75–110)
GLUCOSE SERPL-MCNC: 136 MG/DL (ref 70–99)
HCO3 VENOUS: 27.7 MMOL/L (ref 22–29)
HCT VFR BLD AUTO: 48 % (ref 41–53)
HGB BLD-MCNC: 16.2 G/DL (ref 13.5–17.5)
INR PPP: 1 (ref 0.9–1.2)
LYMPHOCYTES NFR BLD: 2.5 K/UL (ref 1–4.8)
LYMPHOCYTES RELATIVE PERCENT: 26 % (ref 24–44)
MCH RBC QN AUTO: 31.6 PG (ref 26–34)
MCHC RBC AUTO-ENTMCNC: 33.8 G/DL (ref 31–37)
MCV RBC AUTO: 93.6 FL (ref 80–100)
MONOCYTES NFR BLD: 1.2 K/UL (ref 0.1–1.2)
MONOCYTES NFR BLD: 13 % (ref 2–11)
NEUTROPHILS NFR BLD: 55 % (ref 36–66)
NEUTS SEG NFR BLD: 5.1 K/UL (ref 1.8–7.7)
O2 DELIVERY DEVICE: NORMAL
O2 SAT, VEN: 67.7 % (ref 60–85)
PARTIAL THROMBOPLASTIN TIME: 23.2 SEC (ref 24–36)
PCO2 VENOUS: 46 MM HG (ref 41–51)
PH VENOUS: 7.39 (ref 7.32–7.43)
PLATELET # BLD AUTO: 233 K/UL (ref 140–450)
PMV BLD AUTO: 9.1 FL (ref 6–12)
PO2 VENOUS: 36 MM HG (ref 30–50)
POSITIVE BASE EXCESS, VEN: 1.8 MMOL/L (ref 0–3)
POTASSIUM SERPL-SCNC: 4.4 MMOL/L (ref 3.7–5.3)
PROTHROMBIN TIME: 13.2 SEC (ref 11.8–14.6)
RBC # BLD AUTO: 5.13 M/UL (ref 4.5–5.9)
SODIUM SERPL-SCNC: 138 MMOL/L (ref 135–144)
TROPONIN I SERPL HS-MCNC: <6 NG/L (ref 0–22)
TROPONIN I SERPL HS-MCNC: <6 NG/L (ref 0–22)
WBC OTHER # BLD: 9.4 K/UL (ref 3.5–11)

## 2025-04-02 PROCEDURE — 96372 THER/PROPH/DIAG INJ SC/IM: CPT

## 2025-04-02 PROCEDURE — 99396 PREV VISIT EST AGE 40-64: CPT | Performed by: NURSE PRACTITIONER

## 2025-04-02 PROCEDURE — 36415 COLL VENOUS BLD VENIPUNCTURE: CPT

## 2025-04-02 PROCEDURE — G0378 HOSPITAL OBSERVATION PER HR: HCPCS

## 2025-04-02 PROCEDURE — 83880 ASSAY OF NATRIURETIC PEPTIDE: CPT

## 2025-04-02 PROCEDURE — 99223 1ST HOSP IP/OBS HIGH 75: CPT | Performed by: INTERNAL MEDICINE

## 2025-04-02 PROCEDURE — 85025 COMPLETE CBC W/AUTO DIFF WBC: CPT

## 2025-04-02 PROCEDURE — 82803 BLOOD GASES ANY COMBINATION: CPT

## 2025-04-02 PROCEDURE — 6360000004 HC RX CONTRAST MEDICATION: Performed by: EMERGENCY MEDICINE

## 2025-04-02 PROCEDURE — 6370000000 HC RX 637 (ALT 250 FOR IP): Performed by: EMERGENCY MEDICINE

## 2025-04-02 PROCEDURE — 3079F DIAST BP 80-89 MM HG: CPT | Performed by: NURSE PRACTITIONER

## 2025-04-02 PROCEDURE — 6370000000 HC RX 637 (ALT 250 FOR IP): Performed by: INTERNAL MEDICINE

## 2025-04-02 PROCEDURE — 2500000003 HC RX 250 WO HCPCS: Performed by: EMERGENCY MEDICINE

## 2025-04-02 PROCEDURE — 82947 ASSAY GLUCOSE BLOOD QUANT: CPT

## 2025-04-02 PROCEDURE — 93005 ELECTROCARDIOGRAM TRACING: CPT | Performed by: EMERGENCY MEDICINE

## 2025-04-02 PROCEDURE — 71260 CT THORAX DX C+: CPT

## 2025-04-02 PROCEDURE — 99285 EMERGENCY DEPT VISIT HI MDM: CPT

## 2025-04-02 PROCEDURE — 2500000003 HC RX 250 WO HCPCS: Performed by: INTERNAL MEDICINE

## 2025-04-02 PROCEDURE — 6360000002 HC RX W HCPCS: Performed by: INTERNAL MEDICINE

## 2025-04-02 PROCEDURE — 85610 PROTHROMBIN TIME: CPT

## 2025-04-02 PROCEDURE — 84484 ASSAY OF TROPONIN QUANT: CPT

## 2025-04-02 PROCEDURE — 99214 OFFICE O/P EST MOD 30 MIN: CPT | Performed by: NURSE PRACTITIONER

## 2025-04-02 PROCEDURE — 3075F SYST BP GE 130 - 139MM HG: CPT | Performed by: NURSE PRACTITIONER

## 2025-04-02 PROCEDURE — 85730 THROMBOPLASTIN TIME PARTIAL: CPT

## 2025-04-02 RX ORDER — PANTOPRAZOLE SODIUM 40 MG/1
40 TABLET, DELAYED RELEASE ORAL
Status: DISCONTINUED | OUTPATIENT
Start: 2025-04-03 | End: 2025-04-03 | Stop reason: HOSPADM

## 2025-04-02 RX ORDER — IOPAMIDOL 755 MG/ML
75 INJECTION, SOLUTION INTRAVASCULAR
Status: COMPLETED | OUTPATIENT
Start: 2025-04-02 | End: 2025-04-02

## 2025-04-02 RX ORDER — SODIUM CHLORIDE 0.9 % (FLUSH) 0.9 %
5-40 SYRINGE (ML) INJECTION PRN
Status: DISCONTINUED | OUTPATIENT
Start: 2025-04-02 | End: 2025-04-03 | Stop reason: HOSPADM

## 2025-04-02 RX ORDER — ACETAMINOPHEN 650 MG/1
650 SUPPOSITORY RECTAL EVERY 6 HOURS PRN
Status: DISCONTINUED | OUTPATIENT
Start: 2025-04-02 | End: 2025-04-03 | Stop reason: HOSPADM

## 2025-04-02 RX ORDER — SODIUM CHLORIDE 0.9 % (FLUSH) 0.9 %
5-40 SYRINGE (ML) INJECTION EVERY 12 HOURS SCHEDULED
Status: DISCONTINUED | OUTPATIENT
Start: 2025-04-02 | End: 2025-04-03 | Stop reason: HOSPADM

## 2025-04-02 RX ORDER — POLYETHYLENE GLYCOL 3350 17 G/17G
17 POWDER, FOR SOLUTION ORAL DAILY PRN
Status: DISCONTINUED | OUTPATIENT
Start: 2025-04-02 | End: 2025-04-03 | Stop reason: HOSPADM

## 2025-04-02 RX ORDER — SODIUM CHLORIDE 9 MG/ML
INJECTION, SOLUTION INTRAVENOUS PRN
Status: DISCONTINUED | OUTPATIENT
Start: 2025-04-02 | End: 2025-04-03 | Stop reason: HOSPADM

## 2025-04-02 RX ORDER — GLUCAGON 1 MG/ML
1 KIT INJECTION PRN
Status: DISCONTINUED | OUTPATIENT
Start: 2025-04-02 | End: 2025-04-03 | Stop reason: HOSPADM

## 2025-04-02 RX ORDER — LOSARTAN POTASSIUM 50 MG/1
100 TABLET ORAL DAILY
Status: DISCONTINUED | OUTPATIENT
Start: 2025-04-03 | End: 2025-04-03 | Stop reason: HOSPADM

## 2025-04-02 RX ORDER — DEXTROSE MONOHYDRATE 100 MG/ML
INJECTION, SOLUTION INTRAVENOUS CONTINUOUS PRN
Status: DISCONTINUED | OUTPATIENT
Start: 2025-04-02 | End: 2025-04-03 | Stop reason: HOSPADM

## 2025-04-02 RX ORDER — ENOXAPARIN SODIUM 100 MG/ML
30 INJECTION SUBCUTANEOUS 2 TIMES DAILY
Status: DISCONTINUED | OUTPATIENT
Start: 2025-04-02 | End: 2025-04-03 | Stop reason: HOSPADM

## 2025-04-02 RX ORDER — ONDANSETRON 2 MG/ML
4 INJECTION INTRAMUSCULAR; INTRAVENOUS EVERY 6 HOURS PRN
Status: DISCONTINUED | OUTPATIENT
Start: 2025-04-02 | End: 2025-04-03 | Stop reason: HOSPADM

## 2025-04-02 RX ORDER — ACETAMINOPHEN 325 MG/1
650 TABLET ORAL EVERY 6 HOURS PRN
Status: DISCONTINUED | OUTPATIENT
Start: 2025-04-02 | End: 2025-04-03 | Stop reason: HOSPADM

## 2025-04-02 RX ORDER — IBUPROFEN 800 MG/1
800 TABLET, FILM COATED ORAL EVERY 6 HOURS PRN
Status: DISCONTINUED | OUTPATIENT
Start: 2025-04-02 | End: 2025-04-03 | Stop reason: HOSPADM

## 2025-04-02 RX ORDER — METOPROLOL TARTRATE 50 MG
50 TABLET ORAL 2 TIMES DAILY
Status: DISCONTINUED | OUTPATIENT
Start: 2025-04-02 | End: 2025-04-03 | Stop reason: HOSPADM

## 2025-04-02 RX ORDER — SODIUM CHLORIDE 0.9 % (FLUSH) 0.9 %
10 SYRINGE (ML) INJECTION PRN
Status: DISCONTINUED | OUTPATIENT
Start: 2025-04-02 | End: 2025-04-03 | Stop reason: HOSPADM

## 2025-04-02 RX ORDER — ASPIRIN 81 MG/1
324 TABLET, CHEWABLE ORAL ONCE
Status: COMPLETED | OUTPATIENT
Start: 2025-04-02 | End: 2025-04-02

## 2025-04-02 RX ORDER — MAGNESIUM SULFATE IN WATER 40 MG/ML
2000 INJECTION, SOLUTION INTRAVENOUS PRN
Status: DISCONTINUED | OUTPATIENT
Start: 2025-04-02 | End: 2025-04-03 | Stop reason: HOSPADM

## 2025-04-02 RX ORDER — POTASSIUM CHLORIDE 7.45 MG/ML
10 INJECTION INTRAVENOUS PRN
Status: DISCONTINUED | OUTPATIENT
Start: 2025-04-02 | End: 2025-04-03 | Stop reason: HOSPADM

## 2025-04-02 RX ORDER — CLOPIDOGREL BISULFATE 75 MG/1
75 TABLET ORAL DAILY
Status: DISCONTINUED | OUTPATIENT
Start: 2025-04-03 | End: 2025-04-03

## 2025-04-02 RX ORDER — 0.9 % SODIUM CHLORIDE 0.9 %
80 INTRAVENOUS SOLUTION INTRAVENOUS ONCE
Status: DISCONTINUED | OUTPATIENT
Start: 2025-04-02 | End: 2025-04-03 | Stop reason: HOSPADM

## 2025-04-02 RX ORDER — ASPIRIN 81 MG/1
81 TABLET ORAL DAILY
Status: DISCONTINUED | OUTPATIENT
Start: 2025-04-03 | End: 2025-04-03 | Stop reason: HOSPADM

## 2025-04-02 RX ORDER — ONDANSETRON 4 MG/1
4 TABLET, ORALLY DISINTEGRATING ORAL EVERY 8 HOURS PRN
Status: DISCONTINUED | OUTPATIENT
Start: 2025-04-02 | End: 2025-04-03 | Stop reason: HOSPADM

## 2025-04-02 RX ORDER — INSULIN LISPRO 100 [IU]/ML
0-4 INJECTION, SOLUTION INTRAVENOUS; SUBCUTANEOUS
Status: DISCONTINUED | OUTPATIENT
Start: 2025-04-02 | End: 2025-04-03 | Stop reason: HOSPADM

## 2025-04-02 RX ORDER — POTASSIUM CHLORIDE 1500 MG/1
40 TABLET, EXTENDED RELEASE ORAL PRN
Status: DISCONTINUED | OUTPATIENT
Start: 2025-04-02 | End: 2025-04-03 | Stop reason: HOSPADM

## 2025-04-02 RX ORDER — ATORVASTATIN CALCIUM 40 MG/1
80 TABLET, FILM COATED ORAL NIGHTLY
Status: DISCONTINUED | OUTPATIENT
Start: 2025-04-02 | End: 2025-04-03 | Stop reason: HOSPADM

## 2025-04-02 RX ADMIN — ATORVASTATIN CALCIUM 80 MG: 40 TABLET, FILM COATED ORAL at 21:01

## 2025-04-02 RX ADMIN — Medication 100 ML: at 10:25

## 2025-04-02 RX ADMIN — METOPROLOL TARTRATE 50 MG: 50 TABLET, FILM COATED ORAL at 21:01

## 2025-04-02 RX ADMIN — ASPIRIN 324 MG: 81 TABLET, CHEWABLE ORAL at 11:00

## 2025-04-02 RX ADMIN — SODIUM CHLORIDE, PRESERVATIVE FREE 10 ML: 5 INJECTION INTRAVENOUS at 10:25

## 2025-04-02 RX ADMIN — SODIUM CHLORIDE, PRESERVATIVE FREE 10 ML: 5 INJECTION INTRAVENOUS at 21:08

## 2025-04-02 RX ADMIN — IOPAMIDOL 75 ML: 755 INJECTION, SOLUTION INTRAVENOUS at 10:25

## 2025-04-02 RX ADMIN — ENOXAPARIN SODIUM 30 MG: 100 INJECTION SUBCUTANEOUS at 21:00

## 2025-04-02 SDOH — ECONOMIC STABILITY: FOOD INSECURITY: WITHIN THE PAST 12 MONTHS, THE FOOD YOU BOUGHT JUST DIDN'T LAST AND YOU DIDN'T HAVE MONEY TO GET MORE.: NEVER TRUE

## 2025-04-02 SDOH — ECONOMIC STABILITY: FOOD INSECURITY: WITHIN THE PAST 12 MONTHS, YOU WORRIED THAT YOUR FOOD WOULD RUN OUT BEFORE YOU GOT MONEY TO BUY MORE.: NEVER TRUE

## 2025-04-02 ASSESSMENT — PATIENT HEALTH QUESTIONNAIRE - PHQ9
8. MOVING OR SPEAKING SO SLOWLY THAT OTHER PEOPLE COULD HAVE NOTICED. OR THE OPPOSITE, BEING SO FIGETY OR RESTLESS THAT YOU HAVE BEEN MOVING AROUND A LOT MORE THAN USUAL: NOT AT ALL
2. FEELING DOWN, DEPRESSED OR HOPELESS: NOT AT ALL
1. LITTLE INTEREST OR PLEASURE IN DOING THINGS: NOT AT ALL
SUM OF ALL RESPONSES TO PHQ QUESTIONS 1-9: 0
6. FEELING BAD ABOUT YOURSELF - OR THAT YOU ARE A FAILURE OR HAVE LET YOURSELF OR YOUR FAMILY DOWN: NOT AT ALL
3. TROUBLE FALLING OR STAYING ASLEEP: NOT AT ALL
SUM OF ALL RESPONSES TO PHQ QUESTIONS 1-9: 0
10. IF YOU CHECKED OFF ANY PROBLEMS, HOW DIFFICULT HAVE THESE PROBLEMS MADE IT FOR YOU TO DO YOUR WORK, TAKE CARE OF THINGS AT HOME, OR GET ALONG WITH OTHER PEOPLE: NOT DIFFICULT AT ALL
SUM OF ALL RESPONSES TO PHQ QUESTIONS 1-9: 0
4. FEELING TIRED OR HAVING LITTLE ENERGY: NOT AT ALL
9. THOUGHTS THAT YOU WOULD BE BETTER OFF DEAD, OR OF HURTING YOURSELF: NOT AT ALL
7. TROUBLE CONCENTRATING ON THINGS, SUCH AS READING THE NEWSPAPER OR WATCHING TELEVISION: NOT AT ALL
SUM OF ALL RESPONSES TO PHQ QUESTIONS 1-9: 0
5. POOR APPETITE OR OVEREATING: NOT AT ALL

## 2025-04-02 ASSESSMENT — ENCOUNTER SYMPTOMS
DIARRHEA: 0
RHINORRHEA: 0
VOMITING: 0
ABDOMINAL DISTENTION: 0
ABDOMINAL PAIN: 0
COLOR CHANGE: 0
SHORTNESS OF BREATH: 1
CONSTIPATION: 0
SHORTNESS OF BREATH: 1
CHEST TIGHTNESS: 0
NAUSEA: 0
BACK PAIN: 0
COUGH: 0
SORE THROAT: 0
NAUSEA: 0
BACK PAIN: 0

## 2025-04-02 ASSESSMENT — LIFESTYLE VARIABLES
HOW OFTEN DO YOU HAVE A DRINK CONTAINING ALCOHOL: 4 OR MORE TIMES A WEEK
HOW MANY STANDARD DRINKS CONTAINING ALCOHOL DO YOU HAVE ON A TYPICAL DAY: 10 OR MORE

## 2025-04-02 ASSESSMENT — PAIN SCALES - GENERAL: PAINLEVEL_OUTOF10: 7

## 2025-04-02 ASSESSMENT — PAIN - FUNCTIONAL ASSESSMENT: PAIN_FUNCTIONAL_ASSESSMENT: 0-10

## 2025-04-02 ASSESSMENT — HEART SCORE: ECG: NORMAL

## 2025-04-02 NOTE — H&P
Value Ref Range    pH, [place] 7.387 7.320 - 7.430    pCO2, [place] 46.0 41.0 - 51.0 mm Hg    PO2, [place] 36.0 30.0 - 50.0 mm Hg    HCO3, Venous 27.7 22.0 - 29.0 mmol/L    Positive Base Excess, [place] 1.8 0.0 - 3.0 mmol/L    O2 Sat, [place] 67.7 60.0 - 85.0 %    O2 Delivery Device Room Air    Troponin    Collection Time: 04/02/25 11:21 AM   Result Value Ref Range    Troponin, High Sensitivity <6 0 - 22 ng/L       Imaging/Diagnostics:  CT CHEST PULMONARY EMBOLISM W CONTRAST  Result Date: 4/2/2025  No evidence of pulmonary embolism or acute pulmonary abnormality.       Assessment :      Hospital Problems           Last Modified POA    * (Principal) Exertional angina 4/2/2025 Yes    Essential hypertension 4/2/2025 Yes    Pure hypercholesterolemia 4/2/2025 Yes    Type 2 diabetes mellitus without complication, without long-term current use of insulin 4/2/2025 Yes    Presence of drug coated stent in LAD coronary artery (Chronic) 4/2/2025 Yes    Presence of drug coated stent in right coronary artery 4/2/2025 Yes       Plan:     Patient status observation in the Progressive Unit/Step down    Observation stay  Insulin scale for glycemic control  Continue home metformin and Jardiance  Monitor and control blood pressure  GI and DVT prophylaxis  Activity as tolerated  Cardiology consultation, history of prior heart cath with stents x 2, exertional angina has been recurrent, high risk for need for repeat intervention.  Defer to cardiology stress test versus heart cath  Diabetic/cardiac diet today, n.p.o. at midnight    Consultations:   IP CONSULT TO HOSPITALIST  IP CONSULT TO CARDIOLOGY     Patient is admitted as inpatient status because of co-morbidities listed above, severity of signs and symptoms as outlined, requirement for current medical therapies and most importantly because of direct risk to patient if care not provided in a hospital setting.  Expected length of stay > 48 hours.    Loi Schneider DO  4/2/2025  4:21 PM    Copy sent to

## 2025-04-02 NOTE — PATIENT INSTRUCTIONS
Health Maintenance Recommendations  Exercise   I generally recommend that people of all ages try to get 150 minutes of physical activity per week and it doesn’t matter how this totals up, in other words 30 minutes 5 days per week is as good as 50 minutes 3 days a week and so on.    The level of activity should be such that it is able to get your heart rate up to 100 or more, for example a brisk walk should achieve this rate.   Dietary Recommendations  In terms of diet, I generally recommend trying to eat a healthy well balanced diet full of fruits and vegetables. Avoid carbonated drinks and fruit juices and limit your alcohol use.   Avoid processed foods wherever possible (anything that comes in a can or a box) which can be achieved by sticking to the outside walls of the grocery store where generally you will find fresh fruits/vegetables, meats, dairy, and frozen foods.    Try to avoid starches in the diet where possible and minimize bread, rice, potatoes, and pasta in the diet.  Specifically try to avoid gluten, which even in people that don’t have a nikita allergy, causes havoc in the small intestine and alters absorption of nutrients which can in turn lead to obesity.   Sleep  Try to achieve a regular sleep schedule, waking and laying down at the same time each night.  Most people need 7 hours per night plus or minus 2 hours.    You will know that you’re getting enough because you will wake feeling refreshed and not need to sleep in to catch up on weekends.   Skin Care  Make sure that you don’t neglect your skin.    Play it safe in the sun. Use a sunblock on all of your exposed skin.   The sunblock should be broad spectrum and water resistant.    I do recommend an SPF 30 or higher sun screen any time that you plan to be in the sun for more than 20 minutes, even in the winter or on cloudy days (keep in mind that UV light penetrates clouds and can cause burns even on cloudy days).   Apply 20 to 30 minutes before

## 2025-04-02 NOTE — PROGRESS NOTES
Pharmacist Review and Automatic Dose Adjustment of Prophylactic Enoxaparin    The reviewing pharmacist has made an adjustment to the ordered enoxaparin dose or converted to UFH per the approved [facility] protocol and table as identified below.      Neymar Lyon is a 60 y.o. male.     Recent Labs     04/02/25  0926   CREATININE 1.0  1.0     Estimated Creatinine Clearance: 105 mL/min (based on SCr of 1 mg/dL).    Recent Labs     04/02/25  0926   HGB 16.2  16.2   HCT 48.0  48.0     233     Recent Labs     04/02/25  0926   INR 1.0     Height:   Ht Readings from Last 1 Encounters:   04/02/25 1.803 m (5' 11\")     Weight:  Wt Readings from Last 1 Encounters:   04/02/25 120.2 kg (265 lb)           Plan: Based upon the patient's weight and renal function    Ordered: Enoxaparin 40mg SUBQ Daily    Changed/converted to    New Order: Enoxaparin 30mg SUBQ BID    Thank you,  Nena Ivan [facility]  4/2/2025, 3:58 PM

## 2025-04-02 NOTE — PROGRESS NOTES
Rekha Hardy, ART-CNP  [facility] MEDICINE  [address], [address]  [facility] [facility] [address]  Dept: [phone]  Dept Fax: [phone]     Patient ID: Neymar Lyon is a 60 y.o. male Established patient    HPI    Presents for regular check-up and review of labs. Today, patient complains of none.  Pt denies any fever or chills.  Pt today denies any HA, chest pain, or SOB.  Pt denies any N/V/D/C or abdominal pain.    - has cardiology appt today at 11:30 due to chest pain, SOB with his current job he is doing, there is no flat land so it is more taxing.  He gets chest pain  and SOB with any exertion, at one instance he got really lightheaded and felt like he was \"floating\" and he sat there and last about a min, denies any lightheaded or dizziness other than that one instance. If he sits down and rest it will then go away. He made the comment \"it does feel like at times how it felt when I had my first heart attack\". This has been going on for a few weeks, he was waiting for his appt with PCP and cardiologist today to further discuss.   - had root canal on back tooth and noticed that with chest pain he gets pain to that tooth or jaw area as well.     Preventative care, male:  Last colonoscopy: 7/2019  Nicotine use: former  Alcohol use: yes  Drug use: never  CT lung screening:   Last dental appointment: 6 months ago   Last optometry appointment: a long time     Otherwise pt doing well on current tx and no other concerns today.     The patient's past medical, surgical, social, and family history as well as his current medications and allergies were reviewed as documented in today's encounter by TRENT Gillis.      Previous office notes, labs, imaging and hospital records were reviewed prior to and during encounter.    Current Outpatient Medications on File Prior to Visit   Medication Sig Dispense Refill    sertraline (ZOLOFT) 100 MG tablet Take 1 tablet by mouth daily 90 tablet

## 2025-04-02 NOTE — ED NOTES
each by Does not apply route daily    CONTINUOUS GLUCOSE SENSOR (FREESTYLE LEON 14 DAY SENSOR) [facility]    1 each by Does not apply route every 14 days    EMPAGLIFLOZIN (JARDIANCE) 10 MG TABLET    Take 1 tablet by mouth daily    IBUPROFEN (ADVIL;MOTRIN) 800 MG TABLET    Take 1 tablet by mouth every 6 hours as needed for Pain    LOSARTAN (COZAAR) 100 MG TABLET    Take 1 tablet by mouth daily    METFORMIN (GLUCOPHAGE) 1000 MG TABLET    TAKE 1 TABLET BY MOUTH TWICE DAILY WITH MEALS    METOPROLOL TARTRATE (LOPRESSOR) 50 MG TABLET    Take 1 tablet by mouth 2 times daily    OMEPRAZOLE (PRILOSEC) 40 MG DELAYED RELEASE CAPSULE    Take 1 capsule by mouth once daily    SERTRALINE (ZOLOFT) 100 MG TABLET    Take 1 tablet by mouth daily     Orders Placed This Encounter   Medications    sodium chloride 0.9 % bolus 80 mL    sodium chloride flush 0.9 % injection 10 mL    iopamidol (ISOVUE-370) 76 % injection 75 mL    aspirin chewable tablet 324 mg       SURGICAL HISTORY       Past Surgical History:   Procedure Laterality Date    BICEPS TENDON REPAIR Right 02/06/2020    BICEPS TENDON REPAIR - ARTHROSCOPIC BICEPS TENODESIS performed by Bassam Alcocer MD at [facility] OR    COLONOSCOPY N/A 07/30/2019    COLORECTAL CANCER SCREENING, NOT HIGH RISK performed by Suhas Bundy MD at [facility] OR    CORONARY ANGIOPLASTY WITH STENT PLACEMENT      CYSTOSCOPY Right 08/18/2020    CYSTO URETEROSCOPY HOLMIUM LASER W/STENT INSERTION performed by Wilver Rodriguez MD at [facility] OR    FIBULA FRACTURE SURGERY Right 2/19/2024    RIGHT DISTAL FIBULA OPEN REDUCTION INTERNAL FIXATION WITH SYNTHES performed by Austin Frances MD at [facility] OR    HAND SURGERY      HERNIA REPAIR  11/06/2020    HERNIA VENTRAL REPAIR LAPAROSCOPIC ROBOTIC    HERNIA REPAIR N/A 11/06/2020    HERNIA VENTRAL REPAIR LAPAROSCOPIC ROBOTIC performed by Suhas Bundy MD at [facility] OR    LITHOTRIPSY      SHOULDER ARTHROSCOPY Right 02/06/2020    ROTATOR CUFF REPAIR

## 2025-04-03 VITALS
BODY MASS INDEX: 37.1 KG/M2 | OXYGEN SATURATION: 90 % | WEIGHT: 265 LBS | SYSTOLIC BLOOD PRESSURE: 114 MMHG | HEIGHT: 71 IN | TEMPERATURE: 98.2 F | HEART RATE: 82 BPM | DIASTOLIC BLOOD PRESSURE: 82 MMHG | RESPIRATION RATE: 16 BRPM

## 2025-04-03 LAB
ACT BLD: 263 SEC (ref 79–149)
ANION GAP SERPL CALCULATED.3IONS-SCNC: 12 MMOL/L (ref 9–17)
BASOPHILS # BLD: 0.1 K/UL (ref 0–0.2)
BASOPHILS NFR BLD: 1 % (ref 0–2)
BUN SERPL-MCNC: 12 MG/DL (ref 6–20)
CALCIUM SERPL-MCNC: 9 MG/DL (ref 8.6–10.4)
CHLORIDE SERPL-SCNC: 102 MMOL/L (ref 98–107)
CO2 SERPL-SCNC: 24 MMOL/L (ref 20–31)
CREAT SERPL-MCNC: 0.8 MG/DL (ref 0.7–1.2)
ECHO BSA: 2.45 M2
EOSINOPHIL # BLD: 0.4 K/UL (ref 0–0.4)
EOSINOPHILS RELATIVE PERCENT: 5 % (ref 1–4)
ERYTHROCYTE [DISTWIDTH] IN BLOOD BY AUTOMATED COUNT: 14.9 % (ref 12.5–15.4)
GFR, ESTIMATED: >90 ML/MIN/1.73M2
GLUCOSE BLD-MCNC: 123 MG/DL (ref 75–110)
GLUCOSE BLD-MCNC: 141 MG/DL (ref 75–110)
GLUCOSE SERPL-MCNC: 134 MG/DL (ref 70–99)
HCT VFR BLD AUTO: 49.6 % (ref 41–53)
HGB BLD-MCNC: 16.7 G/DL (ref 13.5–17.5)
LYMPHOCYTES NFR BLD: 2.9 K/UL (ref 1–4.8)
LYMPHOCYTES RELATIVE PERCENT: 33 % (ref 24–44)
MCH RBC QN AUTO: 31.5 PG (ref 26–34)
MCHC RBC AUTO-ENTMCNC: 33.8 G/DL (ref 31–37)
MCV RBC AUTO: 93.3 FL (ref 80–100)
MONOCYTES NFR BLD: 0.9 K/UL (ref 0.1–1.2)
MONOCYTES NFR BLD: 11 % (ref 2–11)
NEUTROPHILS NFR BLD: 50 % (ref 36–66)
NEUTS SEG NFR BLD: 4.5 K/UL (ref 1.8–7.7)
PLATELET # BLD AUTO: 231 K/UL (ref 140–450)
PMV BLD AUTO: 9.6 FL (ref 6–12)
POTASSIUM SERPL-SCNC: 4.6 MMOL/L (ref 3.7–5.3)
RBC # BLD AUTO: 5.31 M/UL (ref 4.5–5.9)
SODIUM SERPL-SCNC: 138 MMOL/L (ref 135–144)
WBC OTHER # BLD: 8.8 K/UL (ref 3.5–11)

## 2025-04-03 PROCEDURE — 99232 SBSQ HOSP IP/OBS MODERATE 35: CPT | Performed by: INTERNAL MEDICINE

## 2025-04-03 PROCEDURE — 6360000002 HC RX W HCPCS: Performed by: INTERNAL MEDICINE

## 2025-04-03 PROCEDURE — C9600 PERC DRUG-EL COR STENT SING: HCPCS | Performed by: INTERNAL MEDICINE

## 2025-04-03 PROCEDURE — C1894 INTRO/SHEATH, NON-LASER: HCPCS | Performed by: INTERNAL MEDICINE

## 2025-04-03 PROCEDURE — C1887 CATHETER, GUIDING: HCPCS | Performed by: INTERNAL MEDICINE

## 2025-04-03 PROCEDURE — 6370000000 HC RX 637 (ALT 250 FOR IP): Performed by: INTERNAL MEDICINE

## 2025-04-03 PROCEDURE — 85025 COMPLETE CBC W/AUTO DIFF WBC: CPT

## 2025-04-03 PROCEDURE — 2709999900 HC NON-CHARGEABLE SUPPLY: Performed by: INTERNAL MEDICINE

## 2025-04-03 PROCEDURE — 36415 COLL VENOUS BLD VENIPUNCTURE: CPT

## 2025-04-03 PROCEDURE — 99152 MOD SED SAME PHYS/QHP 5/>YRS: CPT | Performed by: INTERNAL MEDICINE

## 2025-04-03 PROCEDURE — G0378 HOSPITAL OBSERVATION PER HR: HCPCS

## 2025-04-03 PROCEDURE — 80048 BASIC METABOLIC PNL TOTAL CA: CPT

## 2025-04-03 PROCEDURE — C1725 CATH, TRANSLUMIN NON-LASER: HCPCS | Performed by: INTERNAL MEDICINE

## 2025-04-03 PROCEDURE — 96372 THER/PROPH/DIAG INJ SC/IM: CPT

## 2025-04-03 PROCEDURE — 2500000003 HC RX 250 WO HCPCS: Performed by: INTERNAL MEDICINE

## 2025-04-03 PROCEDURE — 6360000004 HC RX CONTRAST MEDICATION: Performed by: INTERNAL MEDICINE

## 2025-04-03 PROCEDURE — 99153 MOD SED SAME PHYS/QHP EA: CPT | Performed by: INTERNAL MEDICINE

## 2025-04-03 PROCEDURE — 82947 ASSAY GLUCOSE BLOOD QUANT: CPT

## 2025-04-03 PROCEDURE — C1769 GUIDE WIRE: HCPCS | Performed by: INTERNAL MEDICINE

## 2025-04-03 PROCEDURE — C1874 STENT, COATED/COV W/DEL SYS: HCPCS | Performed by: INTERNAL MEDICINE

## 2025-04-03 PROCEDURE — 99222 1ST HOSP IP/OBS MODERATE 55: CPT | Performed by: INTERNAL MEDICINE

## 2025-04-03 PROCEDURE — 85347 COAGULATION TIME ACTIVATED: CPT

## 2025-04-03 PROCEDURE — C1753 CATH, INTRAVAS ULTRASOUND: HCPCS | Performed by: INTERNAL MEDICINE

## 2025-04-03 PROCEDURE — 93454 CORONARY ARTERY ANGIO S&I: CPT | Performed by: INTERNAL MEDICINE

## 2025-04-03 RX ORDER — SODIUM CHLORIDE 0.9 % (FLUSH) 0.9 %
5-40 SYRINGE (ML) INJECTION PRN
Status: CANCELLED | OUTPATIENT
Start: 2025-04-03

## 2025-04-03 RX ORDER — SODIUM CHLORIDE 9 MG/ML
INJECTION, SOLUTION INTRAVENOUS CONTINUOUS
Status: CANCELLED | OUTPATIENT
Start: 2025-04-03 | End: 2025-04-03

## 2025-04-03 RX ORDER — HEPARIN SODIUM 1000 [USP'U]/ML
INJECTION, SOLUTION INTRAVENOUS; SUBCUTANEOUS PRN
Status: DISCONTINUED | OUTPATIENT
Start: 2025-04-03 | End: 2025-04-03 | Stop reason: HOSPADM

## 2025-04-03 RX ORDER — LIDOCAINE HYDROCHLORIDE 10 MG/ML
INJECTION, SOLUTION INFILTRATION; PERINEURAL PRN
Status: DISCONTINUED | OUTPATIENT
Start: 2025-04-03 | End: 2025-04-03 | Stop reason: HOSPADM

## 2025-04-03 RX ORDER — MIDAZOLAM 1 MG/ML
INJECTION INTRAMUSCULAR; INTRAVENOUS PRN
Status: DISCONTINUED | OUTPATIENT
Start: 2025-04-03 | End: 2025-04-03 | Stop reason: HOSPADM

## 2025-04-03 RX ORDER — IOPAMIDOL 755 MG/ML
INJECTION, SOLUTION INTRAVASCULAR PRN
Status: DISCONTINUED | OUTPATIENT
Start: 2025-04-03 | End: 2025-04-03 | Stop reason: HOSPADM

## 2025-04-03 RX ORDER — SODIUM CHLORIDE 0.9 % (FLUSH) 0.9 %
5-40 SYRINGE (ML) INJECTION EVERY 12 HOURS SCHEDULED
Status: CANCELLED | OUTPATIENT
Start: 2025-04-03

## 2025-04-03 RX ORDER — ASPIRIN 81 MG/1
TABLET, CHEWABLE ORAL PRN
Status: DISCONTINUED | OUTPATIENT
Start: 2025-04-03 | End: 2025-04-03 | Stop reason: HOSPADM

## 2025-04-03 RX ORDER — NITROGLYCERIN 20 MG/100ML
INJECTION INTRAVENOUS PRN
Status: DISCONTINUED | OUTPATIENT
Start: 2025-04-03 | End: 2025-04-03 | Stop reason: HOSPADM

## 2025-04-03 RX ORDER — ACETAMINOPHEN 325 MG/1
650 TABLET ORAL EVERY 4 HOURS PRN
Status: CANCELLED | OUTPATIENT
Start: 2025-04-03

## 2025-04-03 RX ORDER — SODIUM CHLORIDE 9 MG/ML
INJECTION, SOLUTION INTRAVENOUS PRN
Status: CANCELLED | OUTPATIENT
Start: 2025-04-03

## 2025-04-03 RX ADMIN — LOSARTAN POTASSIUM 100 MG: 50 TABLET, FILM COATED ORAL at 08:50

## 2025-04-03 RX ADMIN — SODIUM CHLORIDE, PRESERVATIVE FREE 10 ML: 5 INJECTION INTRAVENOUS at 08:51

## 2025-04-03 RX ADMIN — SERTRALINE HYDROCHLORIDE 100 MG: 50 TABLET ORAL at 08:49

## 2025-04-03 RX ADMIN — ASPIRIN 81 MG: 81 TABLET, COATED ORAL at 08:40

## 2025-04-03 RX ADMIN — METFORMIN HYDROCHLORIDE 1000 MG: 500 TABLET ORAL at 08:49

## 2025-04-03 RX ADMIN — EMPAGLIFLOZIN 10 MG: 10 TABLET, FILM COATED ORAL at 08:49

## 2025-04-03 RX ADMIN — CLOPIDOGREL BISULFATE 75 MG: 75 TABLET, FILM COATED ORAL at 08:40

## 2025-04-03 RX ADMIN — ENOXAPARIN SODIUM 30 MG: 100 INJECTION SUBCUTANEOUS at 08:39

## 2025-04-03 RX ADMIN — METOPROLOL TARTRATE 50 MG: 50 TABLET, FILM COATED ORAL at 08:49

## 2025-04-03 NOTE — PLAN OF CARE
Problem: Chronic Conditions and Co-morbidities  Goal: Patient's chronic conditions and co-morbidity symptoms are monitored and maintained or improved  4/3/2025 0959 by María Benavidez, RN  Outcome: Progressing  Flowsheets (Taken 4/3/2025 0840)  Care Plan - Patient's Chronic Conditions and Co-Morbidity Symptoms are Monitored and Maintained or Improved:   Monitor and assess patient's chronic conditions and comorbid symptoms for stability, deterioration, or improvement   Collaborate with multidisciplinary team to address chronic and comorbid conditions and prevent exacerbation or deterioration   Update acute care plan with appropriate goals if chronic or comorbid symptoms are exacerbated and prevent overall improvement and discharge     Problem: Discharge Planning  Goal: Discharge to home or other facility with appropriate resources  4/3/2025 0959 by María Benavidez, RN  Outcome: Progressing  Flowsheets (Taken 4/3/2025 0840)  Discharge to home or other facility with appropriate resources:   Identify barriers to discharge with patient and caregiver   Arrange for needed discharge resources and transportation as appropriate   Identify discharge learning needs (meds, wound care, etc)   Refer to discharge planning if patient needs post-hospital services based on physician order or complex needs related to functional status, cognitive ability or social support system     Problem: Pain  Goal: Verbalizes/displays adequate comfort level or baseline comfort level  4/3/2025 0959 by María Benavidez, RN  Outcome: Progressing     Problem: Safety - Adult  Goal: Free from fall injury  Outcome: Progressing        Treating an Anxiety Disorder with Medicine  An anxiety disorder can make you feel nervous or fearful without a clear reason. It may occur with depression. Some anxiety disorders can cause intense feelings of fear or panic. You may have physical symptoms. These can include:   Racing heartbeat  Sweating  Dizziness  Treatment for anxiety will likely include therapy (counseling). And medicine may be prescribed to help control your symptoms.   How medicine may help  Anti-anxiety medicine can help to control your symptoms. It can help you feel less anxious. You may feel able to move forward with therapy. Your healthcare provider will tell you when and how to use the medicine. It may be for use before situations that make you anxious. Or you may be told to take the medicine on a regular schedule.    At first, medicines and doses may need to be adjusted to find what works best for you. Try to be patient. Tell your healthcare provider how a medicine makes you feel. This way, you can work together to find the medicine that’s best for you.   Keep in mind that medicines can have side effects. Talk with your provider about any side effects that bother you. Changing the dose or type of medicine may help.   Before you start using a medicine   Before you start taking a medicine for anxiety, ask about:  Side effects. Medicines may cause side effects. Ask your healthcare provider or pharmacist what you can expect. They may have tips for easing some side effects.  Sexual effects. Some medicines can affect your desire for sex. They can affect your ability to have an orgasm. A change in dose or type of medicine may help. If you have a sexual side effect that concerns you, tell your healthcare provider.  Addiction concerns. Tell your healthcare provider if you’ve ever had a problem with drugs or alcohol. You may not have a problem with medicines used to treat anxiety disorders. But some medicines for anxiety may not be right for  you.  Medicine interactions. Ask your pharmacist before using any over-the-counter medicines. This includes vitamins and herbal supplements. Some of these may interact with your anti-anxiety medicine. They may make them work less or be too strong.  When you start using a medicine   Keep in mind:  Some medicines must be taken on a schedule.  Make this part of your daily routine. For example, take your pill each day before brushing your teeth. A pillbox can help you remember if you’ve taken your medicine each day.  Medicines are often taken for 6 to 12 months.  Your healthcare provider will then assess if you need to stay on them. If you also had therapy, you may no longer need medicine to manage your anxiety.  You may need to stop your medicine slowly.  This is to give your body time to adjust. When it’s time to stop, your healthcare provider will tell you more. Never stop your medicine without talking to your provider first.  If your symptoms come back, you may need to take medicine again.  This isn’t your fault. It’s just the nature of your anxiety disorder.  Medicine safety    Don’t change the dose on your own. Don’t stop taking the medicine on your own. That can cause symptoms to come back. It may cause withdrawal symptoms. These can be dangerous.  Anti-anxiety medicine may make you feel a little sleepy. You may have trouble focusing. Don’t drive a car or use machinery while on this medicine, until you know how it affects you.  Never use alcohol or other drugs with anti-anxiety medicines. This can cause loss of muscle control. You may not be able to be awake or aware. It may lead to coma or death.  Use only the amount of medicine prescribed for you. If you took too much medicine, get emergency care right away.  Never share your medicines with others. Never use another person’s medicine.  Store these medicines in a safe place. Make sure they can't be reached by children or visitors.    Frank[employer] last reviewed this  educational content on 11/1/2021 © 2000-2022 [employer]. All rights reserved. This information is not intended as a substitute for professional medical care. Always follow your healthcare professional's instructions.          Understanding Anxiety Disorders  Almost everyone gets nervous now and then. It’s normal to have knots in your stomach before a test. Or for your heart to beat fast on a first date. But an anxiety disorder is much more than a case of nerves. In fact, its symptoms may be overwhelming. But treatment can ease many of these symptoms. Talking with your healthcare provider is the first step.  What are anxiety disorders?  An anxiety disorder causes very strong feelings of panic and fear. These feelings may occur for no clear reason. And they tend to happen again and again. They may prevent you from coping with life. They may cause you great distress. You may then stay away from anything that triggers your fear. In extreme cases, you may never leave the house. Anxiety disorders may cause other symptoms, such as:  Obsessive thoughts that are unwanted and you can’t control  Constant nightmares or painful thoughts of the past  Upset stomach, sweating, and muscle tension  Trouble sleeping or focusing  What causes anxiety disorders?  Anxiety disorders tend to run in families. For some people, childhood abuse or neglect may play a role. For others, stressful life events or trauma may trigger these disorders. Anxiety can trigger low self-esteem and poor coping skills.  Types of anxiety disorders  Panic disorder. This causes a very strong fear of being in danger.  Phobias. These are extreme fears of certain things, places, or events.  Obsessive-compulsive disorder (OCD). This makes you have unwanted thoughts and urges. You also may do certain things over and over.  Posttraumatic stress disorder (PTSD). This occurs in people who have been through a terrible ordeal. It can cause nightmares and  flashbacks about the event.  Generalized anxiety disorder. This causes constant worry. It can greatly disrupt your life.    Getting better  You may believe that nothing can help you. Or, you might fear what others may think. But most anxiety symptoms can be eased. Having an anxiety disorder is nothing to be ashamed of. Most people do best with treatment that combines medicine and individual and group therapy. These aren’t cures. But they can help you live a healthier life.  [employer] last reviewed this educational content on 3/1/2020  © 2464-4389 [employer]. All rights reserved. This information is not intended as a substitute for professional medical care. Always follow your healthcare professional's instructions.          Know the Signs and Symptoms of Depression  Everyone feels down at times. The blues are a natural part of life. But an unhappy period that’s intense or lasts for more than a couple of weeks can be a sign of depression.  Depression is a serious illness. It is not a sign of weakness or a \"character flaw,\" and it is not something you can \"snap out of.\" In fact, most people with depression need treatment to get better. Depression can disrupt the lives of family and friends. If you know someone you think may be depressed, find out what you can do to help.    Recognizing signs of depression  People who are depressed may:  Feel unhappy, sad, blue, down, or miserable nearly every day  Feel helpless, hopeless, or worthless  Lose interest in hobbies, friends, and activities that used to give pleasure  Not sleep well or sleep too much  Gain or lose weight  Feel low on energy or constantly tired  Have a hard time concentrating or making decisions  Lose interest in sex  Have physical symptoms, such as stomachaches, headaches, or backaches  Know the serious signals  Never ignore a person's comments about suicide or behaviors that can lead to self-harm. Warning signals for suicide include:  Threats  or talk of suicide  Statements such as “I won’t be a problem much longer” or “Nothing matters”  Giving away possessions or making a will or  arrangements  Buying a gun or other weapon  Sudden, unexplained cheerfulness or calm after a period of depression  If you notice any of these signs, get help right away. Call a healthcare professional, mental health clinic, or suicide hotline and ask what action to take. In an emergency, don’t hesitate to call the police.  Resources:  National Institutes of Mental Jrrktw731-456-0976uxu.[facility].nih.gov  National [place] on Mental Cupiepf558-669-6126ang.stephanie.org   Mental Health Oildkiz626-129-2305ycx.nmha.org  National Suicide Gjfhluw226-863-4422 (800-SUICIDE)  National Suicide Prevention Qnbowmfo793-693-8430 (762-321-BXCU)www.suicidepreventionlifeline.org   Date Last Reviewed: 2017  © 3350-2089 [employer]. [address] [place] [address]. All rights reserved. This information is not intended as a substitute for professional medical care. Always follow your healthcare professional's instructions.          Depression: Tips to Help Yourself    As your healthcare providers help treat your depression, you can also help yourself. Keep in mind that your illness affects you emotionally, physically, mentally, and socially. So full recovery will take time. Take care of your body and your soul, and be patient with yourself as you get better.  Self-care  Educate yourself. Read about treatment and medicine options. If you have the energy, attend local conferences or support groups. Keep a list of useful websites and helpful books and use them as needed. This illness is not your fault. Don’t blame yourself for your depression.  Manage early symptoms. If you notice symptoms returning, experience triggers, or identify other factors that may lead to a depressive episode, get help as soon as possible. Ask trusted friends and family to monitor your behavior and  let you know if they see anything of concern.  Work with your provider. Find a provider you can trust. Communicate honestly with that person and share information on your treatment for depression and your reaction to medicines.  Be prepared for a crisis. Know what to do if you experience a crisis. Keep the phone number of a crisis hotline and know the location of your community's urgent care centers and the closest emergency department.  Hold off on big decisions. Depression can cloud your judgment. So wait until you feel better before making major life decisions, such as changing jobs, moving, or getting  or .  Be patient. Recovering from depression is a process. Don’t be discouraged if it takes some time to feel better.  Keep it simple. Depression saps your energy and concentration. So you won’t be able to do all the things you used to do. Set small goals and do what you can.  Be with others. Don’t isolate yourself--you’ll only feel worse. Try to be with other people. And take part in fun activities when you can. Go to a movie, ballgame, [employer] service, or social event. Talk openly with people you can trust. And accept help when it’s offered.  Take care of your body  People with depression often lose the desire to take care of themselves. That only makes their problems worse. During treatment and afterward, make a point to:  Exercise. It’s a great way to take care of your body. And studies have shown that exercise helps fight depression.  Avoid drugs and alcohol. These may ease the pain in the short term. But they’ll only make your problems worse in the long run.  Get relief from stress. Ask your healthcare provider for relaxation exercises and techniques to help relieve stress.  Eat right. A balanced and healthy diet helps keep your body healthy.  Date Last Reviewed: 1/1/2017  © 5452-5448 [employer]. [address], [place] [address]. All rights reserved. This information  is not intended as a substitute for professional medical care. Always follow your healthcare professional's instructions.

## 2025-04-03 NOTE — CARE COORDINATION
Case Management Assessment  Initial Evaluation    Date/Time of Evaluation: 4/3/2025 9:20 AM  Assessment Completed by: Ani Jacobs    If patient is discharged prior to next notation, then this note serves as note for discharge by case management.    Patient Name: Neymar Lyon                   YOB: 1965  Diagnosis: Unstable angina (HCC) [I20.0]  Exertional angina [I20.89]                   Date / Time: 4/2/2025  9:12 AM    Patient Admission Status: Observation   Readmission Risk (Low < 19, Mod (19-27), High > 27): No data recorded  Current PCP: Rekha Hardy APRN - CNP  PCP verified by CM? Yes    Chart Reviewed: Yes      History Provided by: Patient  Patient Orientation: Alert and Oriented    Patient Cognition: Alert    Hospitalization in the last 30 days (Readmission):  No    If yes, Readmission Assessment in CM Navigator will be completed.    Advance Directives:      Code Status: Full Code   Patient's Primary Decision Maker is: Legal Next of Kin (wife)      Discharge Planning:    Patient lives with: Spouse/Significant Other Type of Home: House  Primary Care Giver: Self  Patient Support Systems include: Spouse/Significant Other   Current Financial resources: Other (Comment) (commercial)  Current community resources: None  Current services prior to admission: None            Current DME:              Type of Home Care services:  None    ADLS  Prior functional level: Independent in ADLs/IADLs  Current functional level: Independent in ADLs/IADLs    PT AM-PAC:   /24  OT AM-PAC:   /24    Family can provide assistance at DC: Yes  Would you like Case Management to discuss the discharge plan with any other family members/significant others, and if so, who? No  Plans to Return to Present Housing: Yes  Other Identified Issues/Barriers to RETURNING to current housing: none  Potential Assistance needed at discharge: N/A (TBD)            Potential DME:    Patient expects to discharge to: House  Plan

## 2025-04-03 NOTE — PROGRESS NOTES
[facility]  Office: [phone]  Phillip Street DO, Tadeo Oropeza, DO, Loi Schneider DO, Obinna Serna, DO, Allison Murcia MD, Marie Martinez MD, Danuta Light MD, Ellen Medrano MD,  Ashu Bar MD, Jyotsna Liriano MD, Anamaria Maria MD,  Gabe Orta DO, Edwar Cedeno MD, Damián Delgadillo MD, Alfred Street DO, Deirdre Martino MD,  Hill Colvin DO, Marsha Hanson MD, Clarissa Carballo MD, Estephania Ojeda MD,  Cristo Queen MD, Jesus Patterson MD, Dmitriy Dow MD, Justice Borrego MD, Farhan Chaparro MD, Bea Pena MD, Vincenzo Mishra DO, Rolando Moore MD, Gabe Zuluaga MD, Mohsin Reza, MD, Javon Sanchez MD, Shirley Waterhouse, CNP,  Emilia Orellana, CNP, Vinecnzo Heller, CNP,  Veronique Escoto, DNP, Michelle Sharp, CNP, Julia Middleton, CNP, María Lester, CNP, Kayla Shannon, CNP, Lorri Baron, PA-C, Tati Fowler, CNP, Magaly Hess, CNP,  Gabbi Mullen, CNP, Estefani Parker, CNP, Domingo Bolden, PA-C, Kelley Tolliver, CNP,  Elena Arora, CNS, Claudia Elias, CNP, Maricarmen Taylor, CNP,   Ashley Hamilton, CNP         [facility]   IN-PATIENT SERVICE   [facility]    Progress Note    4/3/2025    7:13 AM    Name:   Neymar Lyon  MRN:     [id number]     Acct:      [id number]   Room:   325/325-01   Day:  0  Admit Date:  4/2/2025  9:12 AM    PCP:   Rekha Hardy APRN - CNP  Code Status:  Full Code    Subjective:     C/C:   Chief Complaint   Patient presents with    Chest Pain     With exertion for the past couple of weeks, has been working in [place] for the past few weeks, pain is L chest with no radiation. On a thinner, hx of MI with stents     Shortness of Breath     Self resolves at rest, takes 10-15 minutes      Interval History Status: improved.     Patient is resting, up to chair.  Denies any complaints of chest pain, shortness of breath, nausea vomiting, fevers or chills or acute complaints    Brief History:     This is a 60-year-old male with a history of

## 2025-04-03 NOTE — CONSULTS
induced allergy and/or nephropathy, arrythmia, CVA, MI or death. The patient verbalized understanding and wishes to proceed.   Continue aspirin, Plavix, high intensity statin and beta-blocker for secondary prophylaxis in the meantime  Further recommendations postprocedure  Discussed with patient and nursing.  Discussed with Dr Wyatt Fong MD [facility]

## 2025-04-03 NOTE — PROGRESS NOTES
Spiritual Health History and Assessment/Progress Note  [facility]    (P) Initial Encounter,  ,  ,      Name: Neymar Lyon MRN: [id number]    Age: 60 y.o.     Sex: male   Language: English   Episcopalian: None   Exertional angina     Date: 4/3/2025            Total Time Calculated: (P) 9 min              Spiritual Assessment began in [address]        Referral/Consult From: (P) Rounding   Encounter Overview/Reason: (P) Initial Encounter  Service Provided For: (P) Patient    Lisa, Belief, Meaning:   Patient unable to assess at this time  Family/Friends No family/friends present      Importance and Influence:  Patient unable to assess at this time  Family/Friends No family/friends present    Community:  Patient Other: unable to access at this time  Family/Friends No family/friends present    Assessment and Plan of Care:     Patient Interventions include: Other: unable to access at this time  Family/Friends Interventions include: No family/friends present    Patient Plan of Care: Other: unable to access at this time  Family/Friends Plan of Care: No family/friends present    Electronically signed by Chaplain Zahra on 4/3/2025 at 10:29 AM

## 2025-04-03 NOTE — BRIEF OP NOTE
Brief Postoperative Note      Patient: Neymar Lyon  YOB: 1965  MRN: [id number]    Date of Procedure: 4/3/2025    Pre-Op Diagnosis Codes:      * Unstable angina (HCC) [I20.0]    Post-Op Diagnosis: Same       Procedure(s):  Left heart cath / coronary angiography  Percutaneous coronary intervention  IVUS     Surgeon(s):  Monisha Fong MD    Assistant:  * No surgical staff found *    Anesthesia: IV Sedation    Estimated Blood Loss (mL): Minimal    Complications: None    Specimens:   * No specimens in log *    Implants:  Implant Name Type Inv. Item Serial No.  Lot No. LRB No. Used Action   STENT CORONARY GILBERTO FRONTIER RX 3X22 MM ZOTAROLIMUS ELUT - [id number] Coronary stents STENT CORONARY GILBERTO FRONTIER RX 3X22 MM ZOTAROLIMUS ELUT  [employer]WD [id number] N/A 1 Implanted   STENT CORONARY GILBERTO FRONTIER RX 3X18 MM ZOTAROLIMUS ELUT - [id number] Coronary stents STENT CORONARY GILBERTO FRONTIER RX 3X18 MM ZOTAROLIMUS ELUT  [employer]WD [id number] N/A 1 Implanted         Findings:    Two vessel cad   Patent stent in proximal lad. Mid LAD has 40-50% stenosis at bifurcation of D1 which has ostial 70% stenosis (unchanged from before)   High grade in stent restenosis in mid and distal rca s/p successful ptca/asad x 2    Plan:     Change plavix to brilinta  Continue asa, statin and bb   Ok to dc home in 6 hours   Op follow up in two weeks     Electronically signed by Monisha Fong MD on 4/3/2025 at 12:43 PM

## 2025-04-03 NOTE — PLAN OF CARE
Problem: Chronic Conditions and Co-morbidities  Goal: Patient's chronic conditions and co-morbidity symptoms are monitored and maintained or improved  Outcome: Progressing  Flowsheets  Taken 4/2/2025 2019 by Barbara Gresham RN  Care Plan - Patient's Chronic Conditions and Co-Morbidity Symptoms are Monitored and Maintained or Improved:   Monitor and assess patient's chronic conditions and comorbid symptoms for stability, deterioration, or improvement   Collaborate with multidisciplinary team to address chronic and comorbid conditions and prevent exacerbation or deterioration   Update acute care plan with appropriate goals if chronic or comorbid symptoms are exacerbated and prevent overall improvement and discharge  Taken 4/2/2025 1600 by Katya Baig RN  Care Plan - Patient's Chronic Conditions and Co-Morbidity Symptoms are Monitored and Maintained or Improved: Monitor and assess patient's chronic conditions and comorbid symptoms for stability, deterioration, or improvement     Problem: Discharge Planning  Goal: Discharge to home or other facility with appropriate resources  Outcome: Progressing  Flowsheets  Taken 4/2/2025 2019 by Barbara Gresham RN  Discharge to home or other facility with appropriate resources:   Identify barriers to discharge with patient and caregiver   Arrange for needed discharge resources and transportation as appropriate   Identify discharge learning needs (meds, wound care, etc)   Refer to discharge planning if patient needs post-hospital services based on physician order or complex needs related to functional status, cognitive ability or social support system  Taken 4/2/2025 1600 by Katya Baig RN  Discharge to home or other facility with appropriate resources: Identify barriers to discharge with patient and caregiver     Problem: Pain  Goal: Verbalizes/displays adequate comfort level or baseline comfort level  Outcome: Progressing

## 2025-04-03 NOTE — DISCHARGE SUMMARY
[facility]  Office: [phone]  Phillip Street DO, Tadeo Oropeza DO, Loi Schneider DO, Obinna Serna DO, Allison Murcia MD, Marie Martinez MD, Danuta Light MD, Ellen Medrano MD,  Ashu Bar MD, Jyotsna Liriano MD, Anamaria Maria MD,  Gabe Orta DO, Edwar Cedeno MD, Damián Delgadillo MD, Alfred Street DO, Deirdre Martino MD,  Hill Colvin DO, Marsha Hanson MD, Clarissa Carballo MD, Estephania Ojeda MD,  Cristo Queen MD, Jesus Patterson MD, Dmitriy Dow MD, Justice Borrego MD, Farhan Chaparro MD, Bea Pena MD, Vincenzo Mishra DO, Rolando Moore MD, Gabe Zuluaga MD, Mohsin Reza, MD, Javon Sanchez MD, Shirley Waterhouse, CNP,  Emilia Orellana CNP, Vincenzo Heller, CNP,  Veronique Escoto, [facility], Michelle Sharp, CNP, Julia Middleton, CNP, María Lester, CNP, Kayla Shannon, CNP, Lorri Baron, PA-C, Tati Fowler, CNP, Magaly Hess, CNP,  Gabbi Mullen, CNP, Estefani Parker, CNP, Domingo Bolden, PA-C, Kelley Tolliver, CNP,  Elena Arora, CNS, Claudia Elias, CNP, Maricarmen Taylor, CNP,   Ashley Hamilton, CNP         [facility]   IN-PATIENT SERVICE   [facility]    Discharge Summary     Patient ID: Neymar Lyon  :  1965   MRN: [id number]     ACCOUNT:  [id number]   Patient's PCP: Rekha Hardy APRN - CNP  Admit Date: 2025   Discharge Date: 4/3/2025     Length of Stay: 0  Code Status:  Full Code  Admitting Physician: Loi Schneider DO  Discharge Physician: Loi J Orlop, DO     Active Discharge Diagnoses:     Hospital Problem Lists:  Principal Problem:    Exertional angina  Active Problems:    Essential hypertension    Pure hypercholesterolemia    Type 2 diabetes mellitus without complication, without long-term current use of insulin    Presence of drug coated stent in LAD coronary artery    Presence of drug coated stent in right coronary artery  Resolved Problems:    * No resolved hospital problems. *      Admission Condition:

## 2025-04-04 ENCOUNTER — TELEPHONE (OUTPATIENT)
Dept: FAMILY MEDICINE CLINIC | Age: 60
End: 2025-04-04

## 2025-04-04 LAB
EKG ATRIAL RATE: 65 BPM
EKG P AXIS: 34 DEGREES
EKG P-R INTERVAL: 172 MS
EKG Q-T INTERVAL: 400 MS
EKG QRS DURATION: 86 MS
EKG QTC CALCULATION (BAZETT): 416 MS
EKG R AXIS: 15 DEGREES
EKG T AXIS: 12 DEGREES
EKG VENTRICULAR RATE: 65 BPM

## 2025-04-04 NOTE — TELEPHONE ENCOUNTER
Care Transitions Initial Follow Up Call    Outreach made within 2 business days of discharge: Yes    Patient: Neymar Lyon Patient : 1965   MRN: [id number]  Reason for Admission:    Discharge Date: 4/3/25       Spoke with: Patient    Discharge department/facility: [facility]     TCM Interactive Patient Contact:  Was patient able to fill all prescriptions: Yes  Was patient instructed to bring all medications to the follow-up visit: Yes  Is patient taking all medications as directed in the discharge summary? Yes  Does patient understand their discharge instructions: Yes  Does patient have questions or concerns that need addressed prior to 7-14 day follow up office visit: no    Additional needs identified to be addressed with provider               Scheduled appointment with PCP within 7-14 days    Follow Up  Future Appointments   Date Time Provider Department Center   2025  7:00 AM [facility]       May Raza MA

## 2025-04-07 ENCOUNTER — OFFICE VISIT (OUTPATIENT)
Dept: FAMILY MEDICINE CLINIC | Age: 60
End: 2025-04-07

## 2025-04-07 VITALS
TEMPERATURE: 97.2 F | BODY MASS INDEX: 36.12 KG/M2 | RESPIRATION RATE: 16 BRPM | OXYGEN SATURATION: 95 % | HEART RATE: 63 BPM | DIASTOLIC BLOOD PRESSURE: 76 MMHG | SYSTOLIC BLOOD PRESSURE: 130 MMHG | WEIGHT: 259 LBS

## 2025-04-07 DIAGNOSIS — E55.9 VITAMIN D DEFICIENCY: ICD-10-CM

## 2025-04-07 DIAGNOSIS — I10 ESSENTIAL HYPERTENSION: ICD-10-CM

## 2025-04-07 DIAGNOSIS — E78.00 PURE HYPERCHOLESTEROLEMIA: ICD-10-CM

## 2025-04-07 DIAGNOSIS — I25.2 HX OF MYOCARDIAL INFARCTION: ICD-10-CM

## 2025-04-07 DIAGNOSIS — F41.9 ANXIETY AND DEPRESSION: ICD-10-CM

## 2025-04-07 DIAGNOSIS — F32.A ANXIETY AND DEPRESSION: ICD-10-CM

## 2025-04-07 DIAGNOSIS — I25.118 CORONARY ARTERY DISEASE WITH EXERTIONAL ANGINA: ICD-10-CM

## 2025-04-07 DIAGNOSIS — K21.9 GASTROESOPHAGEAL REFLUX DISEASE WITHOUT ESOPHAGITIS: ICD-10-CM

## 2025-04-07 DIAGNOSIS — Z09 HOSPITAL DISCHARGE FOLLOW-UP: Primary | ICD-10-CM

## 2025-04-07 DIAGNOSIS — E11.9 TYPE 2 DIABETES MELLITUS WITHOUT COMPLICATION, WITHOUT LONG-TERM CURRENT USE OF INSULIN: ICD-10-CM

## 2025-04-07 DIAGNOSIS — E61.1 IRON DEFICIENCY: ICD-10-CM

## 2025-04-07 RX ORDER — PROCHLORPERAZINE 25 MG/1
3 SUPPOSITORY RECTAL
Qty: 3 EACH | Refills: 3 | Status: SHIPPED | OUTPATIENT
Start: 2025-04-07

## 2025-04-07 RX ORDER — PROCHLORPERAZINE 25 MG/1
1 SUPPOSITORY RECTAL
Qty: 1 EACH | Refills: 3 | Status: SHIPPED | OUTPATIENT
Start: 2025-04-07

## 2025-04-07 RX ORDER — ERGOCALCIFEROL 1.25 MG/1
50000 CAPSULE, LIQUID FILLED ORAL WEEKLY
Qty: 12 CAPSULE | Refills: 2 | Status: SHIPPED | OUTPATIENT
Start: 2025-04-07

## 2025-04-07 RX ORDER — FERROUS SULFATE 325(65) MG
325 TABLET ORAL
Qty: 90 TABLET | Refills: 1 | Status: SHIPPED | OUTPATIENT
Start: 2025-04-07

## 2025-04-07 RX ORDER — PROCHLORPERAZINE 25 MG/1
1 SUPPOSITORY RECTAL ONCE
Qty: 1 EACH | Refills: 0 | Status: SHIPPED | OUTPATIENT
Start: 2025-04-07 | End: 2025-04-07

## 2025-04-07 NOTE — PATIENT INSTRUCTIONS
- Get labs done a few days before next appointment. You will want to be fasting which means nothing to eat or drink for 12 hours before completing labs.     - start vitamin D weekly  - start ferrous sulfate daily.

## 2025-05-31 DIAGNOSIS — E11.9 TYPE 2 DIABETES MELLITUS WITHOUT COMPLICATION, WITHOUT LONG-TERM CURRENT USE OF INSULIN (HCC): ICD-10-CM

## 2025-06-02 RX ORDER — EMPAGLIFLOZIN 10 MG/1
10 TABLET, FILM COATED ORAL DAILY
Qty: 90 TABLET | Refills: 1 | Status: SHIPPED | OUTPATIENT
Start: 2025-06-02

## 2025-07-14 ENCOUNTER — OFFICE VISIT (OUTPATIENT)
Dept: FAMILY MEDICINE CLINIC | Age: 60
End: 2025-07-14
Payer: COMMERCIAL

## 2025-07-14 VITALS
SYSTOLIC BLOOD PRESSURE: 124 MMHG | TEMPERATURE: 98.2 F | RESPIRATION RATE: 16 BRPM | OXYGEN SATURATION: 97 % | BODY MASS INDEX: 36.4 KG/M2 | WEIGHT: 261 LBS | DIASTOLIC BLOOD PRESSURE: 76 MMHG | HEART RATE: 77 BPM

## 2025-07-14 DIAGNOSIS — I25.118 CORONARY ARTERY DISEASE WITH EXERTIONAL ANGINA: ICD-10-CM

## 2025-07-14 DIAGNOSIS — E61.1 IRON DEFICIENCY: ICD-10-CM

## 2025-07-14 DIAGNOSIS — K21.9 GASTRIC REFLUX: ICD-10-CM

## 2025-07-14 DIAGNOSIS — E78.00 PURE HYPERCHOLESTEROLEMIA: ICD-10-CM

## 2025-07-14 DIAGNOSIS — F32.A ANXIETY AND DEPRESSION: ICD-10-CM

## 2025-07-14 DIAGNOSIS — E11.9 TYPE 2 DIABETES MELLITUS WITHOUT COMPLICATION, WITHOUT LONG-TERM CURRENT USE OF INSULIN (HCC): ICD-10-CM

## 2025-07-14 DIAGNOSIS — F41.9 ANXIETY AND DEPRESSION: ICD-10-CM

## 2025-07-14 DIAGNOSIS — K21.9 GASTROESOPHAGEAL REFLUX DISEASE WITHOUT ESOPHAGITIS: ICD-10-CM

## 2025-07-14 DIAGNOSIS — I25.2 HX OF MYOCARDIAL INFARCTION: Primary | ICD-10-CM

## 2025-07-14 DIAGNOSIS — F10.10 ALCOHOL USE DISORDER, MILD, ABUSE: ICD-10-CM

## 2025-07-14 DIAGNOSIS — E55.9 VITAMIN D DEFICIENCY: ICD-10-CM

## 2025-07-14 DIAGNOSIS — I10 ESSENTIAL HYPERTENSION: ICD-10-CM

## 2025-07-14 PROCEDURE — 99214 OFFICE O/P EST MOD 30 MIN: CPT | Performed by: NURSE PRACTITIONER

## 2025-07-14 PROCEDURE — 3078F DIAST BP <80 MM HG: CPT | Performed by: NURSE PRACTITIONER

## 2025-07-14 PROCEDURE — 3074F SYST BP LT 130 MM HG: CPT | Performed by: NURSE PRACTITIONER

## 2025-07-14 PROCEDURE — 3044F HG A1C LEVEL LT 7.0%: CPT | Performed by: NURSE PRACTITIONER

## 2025-07-14 RX ORDER — TICAGRELOR 90 MG/1
90 TABLET, FILM COATED ORAL 2 TIMES DAILY
Qty: 60 TABLET | Refills: 5 | Status: SHIPPED | OUTPATIENT
Start: 2025-07-14

## 2025-07-14 RX ORDER — ATORVASTATIN CALCIUM 80 MG/1
80 TABLET, FILM COATED ORAL NIGHTLY
Qty: 90 TABLET | Refills: 1 | Status: SHIPPED | OUTPATIENT
Start: 2025-07-14

## 2025-07-14 RX ORDER — SERTRALINE HYDROCHLORIDE 100 MG/1
100 TABLET, FILM COATED ORAL DAILY
Qty: 90 TABLET | Refills: 1 | Status: SHIPPED | OUTPATIENT
Start: 2025-07-14

## 2025-07-14 RX ORDER — FERROUS SULFATE 325(65) MG
325 TABLET ORAL
Qty: 90 TABLET | Refills: 1 | Status: SHIPPED | OUTPATIENT
Start: 2025-07-14

## 2025-07-14 RX ORDER — ERGOCALCIFEROL 1.25 MG/1
50000 CAPSULE, LIQUID FILLED ORAL WEEKLY
Qty: 12 CAPSULE | Refills: 2 | Status: SHIPPED | OUTPATIENT
Start: 2025-07-14

## 2025-07-14 RX ORDER — METOPROLOL TARTRATE 50 MG
50 TABLET ORAL 2 TIMES DAILY
Qty: 180 TABLET | Refills: 1 | Status: SHIPPED | OUTPATIENT
Start: 2025-07-14

## 2025-07-14 RX ORDER — OMEPRAZOLE 40 MG/1
CAPSULE, DELAYED RELEASE ORAL
Qty: 90 CAPSULE | Refills: 1 | Status: SHIPPED | OUTPATIENT
Start: 2025-07-14

## 2025-07-14 RX ORDER — LOSARTAN POTASSIUM 100 MG/1
100 TABLET ORAL DAILY
Qty: 90 TABLET | Refills: 1 | Status: SHIPPED | OUTPATIENT
Start: 2025-07-14

## 2025-07-14 ASSESSMENT — ENCOUNTER SYMPTOMS
COUGH: 0
CHEST TIGHTNESS: 0
DIARRHEA: 0
ABDOMINAL DISTENTION: 0
COLOR CHANGE: 0
CONSTIPATION: 0
NAUSEA: 0
SORE THROAT: 0
SHORTNESS OF BREATH: 0
ABDOMINAL PAIN: 0
RHINORRHEA: 0
BACK PAIN: 0

## 2025-07-14 NOTE — PROGRESS NOTES
- Non-pharmological coping methods discussed.      Gastroesophageal reflux disease without esophagitis  - Stable: Medication re-filled as needed, con't medications as prescribed, con't current tx plan  - Continue omeprazole as previously prescribed  - Patient educated on avoiding trigger food/drinks such as caffeine, soda, chocolate, greasy/fatty, spicy foods as well as sleeping with head of bed elevated, avoid lying flat after eating and avoiding restrictive clothing around waist.  Assessment & Plan      Routine Health Maintenance  - No chest pain, shortness of breath, headaches, dizziness, or lightheadedness reported.  - Encouraged to continue taking medications as prescribed.  - Advised to schedule an eye exam and ensure a retinal exam is included.  - Next physical exam planned for the following year.    Lab Work  - Patient instructed to complete lab tests including albumin, creatinine, A1c, blood count, and iron levels.  - Cholesterol test to be done if fasting.  - Results will be communicated, and any necessary changes to treatment will be made based on the results.  - Encouraged to complete lab work as soon as possible.    - pt verbalized understanding plan of care.   The patient (or guardian, if applicable) and other individuals in attendance with the patient were advised that Artificial Intelligence will be utilized during this visit to record, process the conversation to generate a clinical note, and support improvement of the AI technology. The patient (or guardian, if applicable) and other individuals in attendance at the appointment consented to the use of AI, including the recording.    Medications, labs, diagnostic studies, consultations and follow-up as documented in this encounter. Rest of systems unchanged, continue current treatments    On this date July 14, 2025, I have spent greater than 50% of this visit reviewing previous notes, test results and/or face to face with the patient discussing the

## (undated) DEVICE — SOLUTION ANTIFOG VIS SYS CLEARIFY LAPSCP

## (undated) DEVICE — GOWN,AURORA,NONRNF,XL,30/CS: Brand: MEDLINE

## (undated) DEVICE — BLANKET WRM W40.2XL55.9IN IORT LO BODY + MISTRAL AIR

## (undated) DEVICE — 3M™ WARMING BLANKET, LOWER BODY, 10 PER CASE, 42568: Brand: BAIR HUGGER™

## (undated) DEVICE — INSUFFLATION TUBING SET WITH FILTER, FUNNEL CONNECTOR AND LUER LOCK: Brand: JOSNOE MEDICAL INC

## (undated) DEVICE — SUTURE V-LOC 180 SZ 0 L9IN ABSRB GRN GS-21 L37MM 1/2 CIR VLOCL0346

## (undated) DEVICE — SUTURE VCRL + SZ 3-0 L27IN ABSRB UD L26MM SH 1/2 CIR VCP416H

## (undated) DEVICE — BNDG,ELSTC,MATRIX,STRL,6"X5YD,LF,HOOK&LP: Brand: MEDLINE

## (undated) DEVICE — GLOVE ORANGE PI 7   MSG9070

## (undated) DEVICE — SUTURE VCRL + SZ 3-0 L36IN ABSRB UD L36MM CT-1 1/2 CIR VCP944H

## (undated) DEVICE — DRAPE SURG W41XL74IN CLR FULL SZ C ARM 3 ADH POLY STRP E

## (undated) DEVICE — STERLING XTRASHARP SHAVER GREAT WHITE SHAVER BLADE, 4.2 MM: Brand: STERLING XTRASHARP SHAVER GREAT WHITE

## (undated) DEVICE — PAD,ABDOMINAL,5"X9",ST,LF,25/BX: Brand: MEDLINE INDUSTRIES, INC.

## (undated) DEVICE — SUTURE VCRL SZ 4-0 L27IN ABSRB UD L26MM SH 1/2 CIR J415H

## (undated) DEVICE — Device

## (undated) DEVICE — 3M™ STERI-STRIP™ COMPOUND BENZOIN TINCTURE 40 BAGS/CARTON 4 CARTONS/CASE C1544: Brand: 3M™ STERI-STRIP™

## (undated) DEVICE — SNARE ENDOSCP L240CM LOOP W27MM SHTH DIA2.4MM WRK CHN 2.8MM

## (undated) DEVICE — JELLY,LUBE,STERILE,FLIP TOP,TUBE,2-OZ: Brand: MEDLINE

## (undated) DEVICE — CANNULA SEAL

## (undated) DEVICE — NEEDLE SPNL L3.5IN PNK HUB S STL REG WALL FIT STYL W/ QNCKE

## (undated) DEVICE — CLOTH PRE OP W9XL10.5IN 2% CHG FRAGRANCE RNS FREE READYPREP

## (undated) DEVICE — SUTURE MCRYL + SZ 4-0 L18IN ABSRB UD L19MM PS-2 3/8 CIR MCP496G

## (undated) DEVICE — DUAL LUMEN URETERAL CATHETER

## (undated) DEVICE — MHPB ARTHROSCOPY PACK: Brand: MEDLINE INDUSTRIES, INC.

## (undated) DEVICE — CHLORAPREP 26ML ORANGE

## (undated) DEVICE — 3M™ STERI-DRAPE™ U-DRAPE 1015: Brand: STERI-DRAPE™

## (undated) DEVICE — SHEET, ORTHO, SPLIT, STERILE: Brand: MEDLINE

## (undated) DEVICE — SUTURE VCRL + SZ 3-0 L27IN ABSRB WHT CT-1 1/2 CIR VCP258H

## (undated) DEVICE — GLOVE SURG 7.5 PF POLYMER WHT STRL SIGN LTX ESSENTIAL LTX

## (undated) DEVICE — GLOVE SURG SZ 8 L12IN THK75MIL DK GRN LTX FREE

## (undated) DEVICE — GAUZE,SPONGE,3"X3",4PLY,NS,LF: Brand: MEDLINE

## (undated) DEVICE — TUBING, SUCTION, 9/32" X 20', STRAIGHT: Brand: MEDLINE INDUSTRIES, INC.

## (undated) DEVICE — GLOVE ORANGE PI 7 1/2   MSG9075

## (undated) DEVICE — BANDAGE,SELF ADHRNT,COFLEX,4"X5YD,STRL: Brand: COLABEL

## (undated) DEVICE — PROTECTOR ULN NRV PUR FOAM HK LOOP STRP ANATOMICALLY

## (undated) DEVICE — DRAPE,U/ SHT,SPLIT,PLAS,STERIL: Brand: MEDLINE

## (undated) DEVICE — COVER,MAYO STAND,STERILE: Brand: MEDLINE

## (undated) DEVICE — DVD-R MAXWELL ROBOTIC SURGERY

## (undated) DEVICE — GOWN,AURORA,NONREINFORCED,LARGE: Brand: MEDLINE

## (undated) DEVICE — SOLUTION IRRIG 1000ML 0.9% SOD CHL USP POUR PLAS BTL

## (undated) DEVICE — APPLICATOR MEDICATED 26 CC SOLUTION HI LT ORNG CHLORAPREP

## (undated) DEVICE — INTENDED FOR TISSUE SEPARATION, AND OTHER PROCEDURES THAT REQUIRE A SHARP SURGICAL BLADE TO PUNCTURE OR CUT.: Brand: BARD-PARKER ® CARBON RIB-BACK BLADES

## (undated) DEVICE — SUTURE ABSORBABLE BRAIDED 2-0 CT-1 27 IN UD VICRYL J259H

## (undated) DEVICE — SUTURE FIBERWIRE SZ 2 W/ TAPERED NEEDLE BLUE L38IN NONABSORB BLU L26.5MM 1/2 CIRCLE AR7200

## (undated) DEVICE — RENTAL LASER HOLMIUM HIGH WATTAGE CASE

## (undated) DEVICE — BIT DRL L125MM DIA2MM ORNG QUIK CPL W/O STP NONRADIOPAQUE

## (undated) DEVICE — ADAPTER URO SCP UROLOK LL

## (undated) DEVICE — POUCH FLD 36X29IN SHLDR HVY LO GLARE MATTE FINISH FLM 2 SUCT

## (undated) DEVICE — GLOVE ORANGE PI 8   MSG9080

## (undated) DEVICE — SUTURE PROL SZ 3-0 L18IN NONABSORBABLE BLU L30MM FS-1 3/8 8663G

## (undated) DEVICE — DISC ABSRB YEL FLR POLYETH MGMT SUCT QUICKSUITE

## (undated) DEVICE — DRESSING,GAUZE,XEROFORM,CURAD,1"X8",ST: Brand: CURAD

## (undated) DEVICE — STRIP,CLOSURE,WOUND,MEDI-STRIP,1/2X4: Brand: MEDLINE

## (undated) DEVICE — Z DISCONTINUED USE 2751540 TUBING IRRIG L10IN DISP PMP ENDOGATOR

## (undated) DEVICE — SUTURE VCRL SZ 3-0 L27IN ABSRB UD L26MM SH 1/2 CIR J416H

## (undated) DEVICE — PLUMEPORT LAPAROSCOPIC SMOKE FILTRATION DEVICE: Brand: PLUMEPORT ACTIV

## (undated) DEVICE — IMMOBILIZER SHLDR L10.5-17IN D7IN SLNG W/ 15DEG ABD PLLW

## (undated) DEVICE — 4.2MM ULTRAFRR: Brand: STERLING ULTRAFRR

## (undated) DEVICE — PADDING CAST W6INXL4YD COT LO LINTING WYTEX

## (undated) DEVICE — GLOVE SURG SZ 75 CRM LTX FREE POLYISOPRENE POLYMER BEAD ANTI

## (undated) DEVICE — SUTURE DEV SZ 2-0 WND CLSR ABSRB GS-22 VLOC COVIDIEN VLOCM2145

## (undated) DEVICE — 4-PORT MANIFOLD: Brand: NEPTUNE 2

## (undated) DEVICE — SUTURE VCRL SZ 0 L36IN ABSRB UD L36MM CT-1 1/2 CIR J946H

## (undated) DEVICE — BLADELESS OBTURATOR: Brand: WECK VISTA

## (undated) DEVICE — KIT POS ULT SHLDR PT CARE REHAB SLNG

## (undated) DEVICE — GLOVE SURG SZ 65 THK91MIL LTX FREE SYN POLYISOPRENE

## (undated) DEVICE — YANKAUER,SMOOTH HANDLE,HIGH CAPACITY: Brand: MEDLINE INDUSTRIES, INC.

## (undated) DEVICE — GOWN,SIRUS,NONRNF,SETINSLV,XL,20/CS: Brand: MEDLINE

## (undated) DEVICE — TROCAR: Brand: KII FIOS FIRST ENTRY

## (undated) DEVICE — NEEDLE SUT PASS FOR ROT CUF LABRAL REP SUREFIRE SCORPION

## (undated) DEVICE — SOLUTION IV IRRIG LACTATED RINGERS 3000ML 2B7487

## (undated) DEVICE — 1010 S-DRAPE TOWEL DRAPE 10/BX: Brand: STERI-DRAPE™

## (undated) DEVICE — DRESSING TRNSPAR W5XL4.5IN FLM SHT SEMIPERMEABLE WIND

## (undated) DEVICE — SINGLE ACTION PUMPING SYSTEM

## (undated) DEVICE — ADHESIVE SKIN CLSR 0.7ML TOP DERMBND ADV

## (undated) DEVICE — SYRINGE MED 50ML LUERLOCK TIP

## (undated) DEVICE — SET VLV 3 PC AWS DISPOSABLE GRDIAN SCOPEVALET

## (undated) DEVICE — PACK,SHOULDER,DRAPE,POUCH: Brand: MEDLINE

## (undated) DEVICE — Z INACTIVE USE 2660664 SOLUTION IRRIG 3000ML 0.9% SOD CHL USP UROMATIC PLAS CONT

## (undated) DEVICE — ST CHARLES CYSTO PACK: Brand: MEDLINE INDUSTRIES, INC.

## (undated) DEVICE — CAP INJ L0.75IN INTMIT

## (undated) DEVICE — TUBING INSUFFLATION CAP W/ EXT CARBON DIOX ENDO SMARTCAP

## (undated) DEVICE — AMBIENT SUPER TURBOVAC 90: Brand: COBLATION

## (undated) DEVICE — TOWEL,OR,DSP,ST,NATURAL,DLX,4/PK,20PK/CS: Brand: MEDLINE

## (undated) DEVICE — ARM DRAPE

## (undated) DEVICE — SOLUTION IV IRRIG WATER 1000ML POUR BRL 2F7114

## (undated) DEVICE — SUTURE SZ 0 27IN 5/8 CIR UR-6  TAPER PT VIOLET ABSRB VICRYL J603H

## (undated) DEVICE — MHPB HAND AND FOOT PACK: Brand: MEDLINE INDUSTRIES, INC.

## (undated) DEVICE — CONNECTOR TBNG AUX H2O JET DISP FOR OLY 160/180 SER

## (undated) DEVICE — SOLUTION IV IRRIG POUR BRL 0.9% SODIUM CHL 2F7124

## (undated) DEVICE — COVER,TABLE,HEAVY DUTY,50"X90",STRL: Brand: MEDLINE

## (undated) DEVICE — DRAPE,REIN 53X77,STERILE: Brand: MEDLINE

## (undated) DEVICE — Z DISCONTINUED USE 2275686 GLOVE SURG SZ 8 L12IN FNGR THK13MIL WHT ISOLEX POLYISOPRENE

## (undated) DEVICE — MHPB BASIC LAP PACK: Brand: MEDLINE INDUSTRIES, INC.

## (undated) DEVICE — GLOVE ORTHO 7 1/2   MSG9475

## (undated) DEVICE — SUTURE PROL SZ 1 L60IN NONABSORBABLE BLU L65MM TP-1 3/8 CIR 8824G

## (undated) DEVICE — STOCKINETTE,IMPERVIOUS,12X48,STERILE: Brand: MEDLINE

## (undated) DEVICE — HYPODERMIC SAFETY NEEDLE: Brand: MAGELLAN

## (undated) DEVICE — BIT DRL L110MM DIA2.5MM YEL QUIK CPL W/O STP NONRADIOLUCENT

## (undated) DEVICE — CANNULA ARTHSCP L4CM DIA8MM PASSPRT BTTN

## (undated) DEVICE — BLANKET WRM W29.9XL79.1IN UP BODY FORC AIR MISTRAL-AIR

## (undated) DEVICE — GARMENT,MEDLINE,DVT,INT,CALF,MED, GEN2: Brand: MEDLINE

## (undated) DEVICE — DRAPE,EXTREMITY,89X128,STERILE: Brand: MEDLINE

## (undated) DEVICE — POUCH INSTR W6.75XL11.5IN FRST 2 PKT ADH FOR ORTH AND

## (undated) DEVICE — SPONGE LAP W18XL18IN WHT COT 4 PLY FLD STRUNG RADPQ DISP ST

## (undated) DEVICE — SPONGE GZ W4XL4IN 16 PLY DATA MSTR TAGGED DBL RADPQ

## (undated) DEVICE — PENCIL ES L3M BTTN SWCH HOLSTER W/ BLDE ELECTRD EDGE

## (undated) DEVICE — ELECTRODE PT RET AD L9FT HI MOIST COND ADH HYDRGEL CORDED

## (undated) DEVICE — STRAP,POSITIONING,KNEE/BODY,FOAM,4X60": Brand: MEDLINE

## (undated) DEVICE — GAUZE,SPONGE,FLUFF,6"X6.75",STRL,5/TRAY: Brand: MEDLINE

## (undated) DEVICE — GUIDEWIRE URO L150CM DIA0.035IN STIFF NIT HYDRPHLC STR TIP

## (undated) DEVICE — COVER,TABLE,60X90,STERILE: Brand: MEDLINE

## (undated) DEVICE — SINGLE PORT MANIFOLD: Brand: NEPTUNE 2